# Patient Record
Sex: FEMALE | Race: OTHER | HISPANIC OR LATINO
[De-identification: names, ages, dates, MRNs, and addresses within clinical notes are randomized per-mention and may not be internally consistent; named-entity substitution may affect disease eponyms.]

---

## 2018-11-13 PROBLEM — Z00.00 ENCOUNTER FOR PREVENTIVE HEALTH EXAMINATION: Status: ACTIVE | Noted: 2018-11-13

## 2018-11-27 ENCOUNTER — APPOINTMENT (OUTPATIENT)
Dept: ENDOCRINOLOGY | Facility: CLINIC | Age: 58
End: 2018-11-27
Payer: COMMERCIAL

## 2018-11-27 VITALS
BODY MASS INDEX: 47.09 KG/M2 | SYSTOLIC BLOOD PRESSURE: 124 MMHG | HEART RATE: 78 BPM | WEIGHT: 293 LBS | HEIGHT: 66 IN | DIASTOLIC BLOOD PRESSURE: 81 MMHG

## 2018-11-27 DIAGNOSIS — E03.9 HYPOTHYROIDISM, UNSPECIFIED: ICD-10-CM

## 2018-11-27 DIAGNOSIS — R73.03 PREDIABETES.: ICD-10-CM

## 2018-11-27 DIAGNOSIS — R68.89 OTHER GENERAL SYMPTOMS AND SIGNS: ICD-10-CM

## 2018-11-27 DIAGNOSIS — E05.00 THYROTOXICOSIS WITH DIFFUSE GOITER W/OUT THYROTOXIC CRISIS OR STORM: ICD-10-CM

## 2018-11-27 DIAGNOSIS — E66.9 OBESITY, UNSPECIFIED: ICD-10-CM

## 2018-11-27 PROCEDURE — 99205 OFFICE O/P NEW HI 60 MIN: CPT

## 2018-11-28 PROBLEM — R73.03 PREDIABETES: Status: ACTIVE | Noted: 2018-11-28

## 2018-11-28 RX ORDER — METFORMIN HYDROCHLORIDE 500 MG/1
500 TABLET, COATED ORAL
Refills: 0 | Status: ACTIVE | COMMUNITY

## 2018-11-28 RX ORDER — ETODOLAC 500 MG/1
TABLET, FILM COATED ORAL
Refills: 0 | Status: ACTIVE | COMMUNITY

## 2018-11-28 RX ORDER — LEVOTHYROXINE SODIUM 0.15 MG/1
150 TABLET ORAL
Refills: 0 | Status: ACTIVE | COMMUNITY

## 2018-11-28 RX ORDER — EZETIMIBE 10 MG/1
10 TABLET ORAL
Refills: 0 | Status: ACTIVE | COMMUNITY

## 2018-11-28 RX ORDER — LISINOPRIL 10 MG/1
10 TABLET ORAL
Refills: 0 | Status: ACTIVE | COMMUNITY

## 2018-11-28 RX ORDER — ASPIRIN 81 MG
81 TABLET, DELAYED RELEASE (ENTERIC COATED) ORAL
Refills: 0 | Status: ACTIVE | COMMUNITY

## 2018-12-07 DIAGNOSIS — E55.9 VITAMIN D DEFICIENCY, UNSPECIFIED: ICD-10-CM

## 2018-12-07 LAB
25(OH)D3 SERPL-MCNC: 16.9 NG/ML
ALBUMIN SERPL ELPH-MCNC: 4.2 G/DL
ALP BLD-CCNC: 90 U/L
ALT SERPL-CCNC: 14 U/L
ANION GAP SERPL CALC-SCNC: 12 MMOL/L
AST SERPL-CCNC: 11 U/L
BILIRUB SERPL-MCNC: 0.2 MG/DL
BUN SERPL-MCNC: 12 MG/DL
CALCIUM SERPL-MCNC: 9.7 MG/DL
CHLORIDE SERPL-SCNC: 105 MMOL/L
CO2 SERPL-SCNC: 24 MMOL/L
CREAT SERPL-MCNC: 0.71 MG/DL
DHEA-S SERPL-MCNC: 39.7 UG/DL
FOLATE SERPL-MCNC: 11.7 NG/ML
GLUCOSE BLDC GLUCOMTR-MCNC: 72
GLUCOSE SERPL-MCNC: 103 MG/DL
IGF-1 INTERP: NORMAL
IGF-I BLD-MCNC: 168 NG/ML
POTASSIUM SERPL-SCNC: 5.3 MMOL/L
PROLACTIN SERPL-MCNC: 21 NG/ML
PROT SERPL-MCNC: 6.9 G/DL
SODIUM SERPL-SCNC: 141 MMOL/L
T3 SERPL-MCNC: 87 NG/DL
T4 FREE SERPL-MCNC: 1.4 NG/DL
TESTOST BND SERPL-MCNC: 0.4 PG/ML
TESTOST SERPL-MCNC: 26.1 NG/DL
TSH SERPL-ACNC: 4.85 UIU/ML
VIT B12 SERPL-MCNC: 641 PG/ML

## 2018-12-12 PROBLEM — E55.9 HYPOVITAMINOSIS D: Status: ACTIVE | Noted: 2018-12-12

## 2018-12-12 LAB
CORTIS 24H UR-MCNC: 24 H
CORTIS 24H UR-MRATE: 19 MCG/24 H
SPECIMEN VOL 24H UR: 2700 ML

## 2018-12-12 RX ORDER — ERGOCALCIFEROL 1.25 MG/1
1.25 MG CAPSULE ORAL
Qty: 12 | Refills: 3 | Status: ACTIVE | COMMUNITY
Start: 2018-12-12 | End: 1900-01-01

## 2019-01-04 ENCOUNTER — APPOINTMENT (OUTPATIENT)
Dept: ENDOCRINOLOGY | Facility: CLINIC | Age: 59
End: 2019-01-04

## 2019-01-29 ENCOUNTER — APPOINTMENT (OUTPATIENT)
Dept: ENDOCRINOLOGY | Facility: CLINIC | Age: 59
End: 2019-01-29

## 2019-09-09 NOTE — ASU PATIENT PROFILE, ADULT - PMH
CAD (coronary artery disease)    HLD (hyperlipidemia)    HTN (hypertension)    Hypothyroidism    Prediabetes

## 2019-09-10 ENCOUNTER — OUTPATIENT (OUTPATIENT)
Dept: OUTPATIENT SERVICES | Facility: HOSPITAL | Age: 59
LOS: 1 days | Discharge: HOME | End: 2019-09-10
Payer: COMMERCIAL

## 2019-09-10 ENCOUNTER — RESULT REVIEW (OUTPATIENT)
Age: 59
End: 2019-09-10

## 2019-09-10 VITALS
SYSTOLIC BLOOD PRESSURE: 148 MMHG | DIASTOLIC BLOOD PRESSURE: 86 MMHG | OXYGEN SATURATION: 99 % | RESPIRATION RATE: 16 BRPM | HEART RATE: 70 BPM

## 2019-09-10 VITALS
OXYGEN SATURATION: 100 % | DIASTOLIC BLOOD PRESSURE: 86 MMHG | RESPIRATION RATE: 18 BRPM | SYSTOLIC BLOOD PRESSURE: 138 MMHG | HEART RATE: 82 BPM | HEIGHT: 66 IN | WEIGHT: 293 LBS | TEMPERATURE: 99 F

## 2019-09-10 DIAGNOSIS — Z98.890 OTHER SPECIFIED POSTPROCEDURAL STATES: Chronic | ICD-10-CM

## 2019-09-10 LAB
BLD GP AB SCN SERPL QL: NEGATIVE — SIGNIFICANT CHANGE UP
GLUCOSE BLDC GLUCOMTR-MCNC: 110 MG/DL — HIGH (ref 70–99)
GLUCOSE BLDC GLUCOMTR-MCNC: 110 MG/DL — HIGH (ref 70–99)
RH IG SCN BLD-IMP: POSITIVE — SIGNIFICANT CHANGE UP

## 2019-09-10 PROCEDURE — 88305 TISSUE EXAM BY PATHOLOGIST: CPT

## 2019-09-10 PROCEDURE — 86900 BLOOD TYPING SEROLOGIC ABO: CPT

## 2019-09-10 PROCEDURE — 58558 HYSTEROSCOPY BIOPSY: CPT

## 2019-09-10 PROCEDURE — 86901 BLOOD TYPING SEROLOGIC RH(D): CPT

## 2019-09-10 PROCEDURE — 86850 RBC ANTIBODY SCREEN: CPT

## 2019-09-10 PROCEDURE — 82962 GLUCOSE BLOOD TEST: CPT

## 2019-09-10 RX ORDER — HYDROMORPHONE HYDROCHLORIDE 2 MG/ML
0.5 INJECTION INTRAMUSCULAR; INTRAVENOUS; SUBCUTANEOUS
Refills: 0 | Status: DISCONTINUED | OUTPATIENT
Start: 2019-09-10 | End: 2019-09-10

## 2019-09-10 RX ORDER — KETOROLAC TROMETHAMINE 30 MG/ML
30 SYRINGE (ML) INJECTION ONCE
Refills: 0 | Status: DISCONTINUED | OUTPATIENT
Start: 2019-09-10 | End: 2019-09-10

## 2019-09-10 RX ORDER — ONDANSETRON 8 MG/1
4 TABLET, FILM COATED ORAL ONCE
Refills: 0 | Status: DISCONTINUED | OUTPATIENT
Start: 2019-09-10 | End: 2019-09-10

## 2019-09-10 NOTE — BRIEF OPERATIVE NOTE - NSICDXBRIEFPROCEDURE_GEN_ALL_CORE_FT
PROCEDURES:  Dilation and curettage, uterus 10-Sep-2019 11:29:32  Yelena Cesar  Diagnostic hysteroscopy 10-Sep-2019 11:29:17  Yelena Cesar

## 2019-09-10 NOTE — BRIEF OPERATIVE NOTE - OPERATION/FINDINGS
Normal external genitalia, 20 weeks uterus. A uterine septum was visualized with hysteroscope, both canals were obliterated by a firm mass that seems to be a large fundal fibroid. D&C was preformed in a standard fashion. EMB was sent to pathology.

## 2019-09-10 NOTE — ASU PREOP CHECKLIST - DENTURES
Called back, left detailed message about the urine drug screen and current recommendations for treatment.  I recommend that they discuss with her counselor her current drug use.  If need be and if she has continued use we can seek out a drug treatment program.   no

## 2019-09-11 LAB — SURGICAL PATHOLOGY STUDY: SIGNIFICANT CHANGE UP

## 2019-10-20 NOTE — ASU PREOP CHECKLIST - 3.
Allergic to latex and alcohol swabs I have personally performed a face to face diagnostic evaluation on this patient. I have reviewed the ACP note and agree with the history, exam and plan of care, except as noted.

## 2020-12-15 NOTE — ASU PREOP CHECKLIST - NOTHING BY MOUTH SINCE
Pt adequately sedated.  Final time out done and agreed by all staff.  See [facility] records for all medications and vital signs.     09-Sep-2019 19:00

## 2021-04-22 ENCOUNTER — OFFICE VISIT (OUTPATIENT)
Dept: URGENT CARE | Facility: CLINIC | Age: 61
End: 2021-04-22
Payer: COMMERCIAL

## 2021-04-22 VITALS
WEIGHT: 288 LBS | OXYGEN SATURATION: 100 % | HEIGHT: 66 IN | TEMPERATURE: 96 F | HEART RATE: 86 BPM | BODY MASS INDEX: 46.28 KG/M2 | RESPIRATION RATE: 18 BRPM

## 2021-04-22 DIAGNOSIS — J02.9 SORE THROAT: Primary | ICD-10-CM

## 2021-04-22 DIAGNOSIS — J02.9 SORE THROAT: ICD-10-CM

## 2021-04-22 PROCEDURE — U0003 INFECTIOUS AGENT DETECTION BY NUCLEIC ACID (DNA OR RNA); SEVERE ACUTE RESPIRATORY SYNDROME CORONAVIRUS 2 (SARS-COV-2) (CORONAVIRUS DISEASE [COVID-19]), AMPLIFIED PROBE TECHNIQUE, MAKING USE OF HIGH THROUGHPUT TECHNOLOGIES AS DESCRIBED BY CMS-2020-01-R: HCPCS | Performed by: PHYSICIAN ASSISTANT

## 2021-04-22 PROCEDURE — U0005 INFEC AGEN DETEC AMPLI PROBE: HCPCS | Performed by: PHYSICIAN ASSISTANT

## 2021-04-22 PROCEDURE — 99203 OFFICE O/P NEW LOW 30 MIN: CPT | Performed by: PHYSICIAN ASSISTANT

## 2021-04-22 RX ORDER — LEVOTHYROXINE SODIUM 175 UG/1
TABLET ORAL
COMMUNITY
Start: 2021-01-27 | End: 2021-08-13 | Stop reason: SDUPTHER

## 2021-04-22 RX ORDER — EZETIMIBE AND SIMVASTATIN 10; 40 MG/1; MG/1
TABLET ORAL
COMMUNITY
Start: 2021-04-21 | End: 2021-08-13 | Stop reason: SDUPTHER

## 2021-04-22 RX ORDER — OFLOXACIN 3 MG/ML
10 SOLUTION AURICULAR (OTIC) 2 TIMES DAILY
Qty: 5 ML | Refills: 0 | Status: SHIPPED | OUTPATIENT
Start: 2021-04-22

## 2021-04-22 RX ORDER — ASPIRIN 81 MG/1
TABLET, COATED ORAL
COMMUNITY
Start: 2021-04-21 | End: 2021-08-13 | Stop reason: SDUPTHER

## 2021-04-22 RX ORDER — CARVEDILOL 12.5 MG/1
TABLET ORAL
COMMUNITY
Start: 2021-04-20 | End: 2021-08-13 | Stop reason: SDUPTHER

## 2021-04-22 NOTE — PATIENT INSTRUCTIONS
Try a throat coat tea, lozenges, or a numbing spray   Advil and tylenol for the throat       Sore Throat, Ambulatory Care   GENERAL INFORMATION:   A sore throat  is often caused by a cold or flu virus  A sore throat may also be caused by bacteria such as strep  Other causes include smoking, a runny nose, allergies, or acid reflux  Seek immediate care for the following symptoms:   · Trouble breathing or swallowing because your throat is swollen or sore    · Drooling because it hurts too much to swallow    · A painful lump in your throat that does not go away after 5 days    · A fever higher than 102? F (39?C) or lasts longer than 3 days    · Confusion    · Blood in your throat or ear  Treatment for a sore throat  will depend on the cause how severe it is  A sore throat cause by a virus will go away on its own without treatment  You will need antibiotics if your sore throat is caused by bacteria  Your sore throat should start to feel better within 3 to 5 days for both viral and bacterial infections  Care for your sore throat:   · Gargle with salt water  Mix ¼ teaspoon salt in a glass of warm water and gargle  This may help reduce swelling in your throat  · Take ibuprofen or acetaminophen:  These medicines decrease pain and fever  They are available without a doctor's order  Ask your healthcare provider which medicine is best for you  Ask how much to take and how often to take it  · Drink more liquids  Cold or warm drinks may help soothe your sore throat  Drinking liquids can also help prevent dehydration  · Use a cool-steam humidifier  to help moisten the air in your room and reduce your throat pain  · Use lozenges, ice, soft foods, or popsicles  to soothe your throat  · Rest your throat as much as possible  Try not to use your voice  This may irritate your throat and worsen your symptoms    Follow up with your healthcare provider as directed:  Write down your questions so you remember to ask them during your visits  CARE AGREEMENT:   You have the right to help plan your care  Learn about your health condition and how it may be treated  Discuss treatment options with your caregivers to decide what care you want to receive  You always have the right to refuse treatment  The above information is an  only  It is not intended as medical advice for individual conditions or treatments  Talk to your doctor, nurse or pharmacist before following any medical regimen to see if it is safe and effective for you  © 2014 [address] is for End User's use only and may not be sold, redistributed or otherwise used for commercial purposes  All illustrations and images included in CareNotes® are the copyrighted property of [employer]  or [employer] instructions provided to patient and patient instructed to self isolate at home until swab returns  Will follow up when COVID swab is available, Recommend to check [address] [place] [address] for quickest access to results  If result is positive individual must quarantine for 10 days from symptom onset  If a significant exposure occurred ( within 6 feet of a COVID positive pt for 15 minutes or more without a mask on) the exposed individual must self isolate for 14 days from that date and monitor for symptoms  Recommend taking Vitamins such as D3, C and Zinc OTC  Vitamin D3 2000 IU once a day  Vitamin C 1g by mouth twice a day 12 hours apart  It is recommended to wear a mask while in public or within 6 feet from others, proper hand washing and hygiene  If you are feeling unwell, we recommend to self-isolate at home and stay away from household contacts until well again  If you develop any chest pain, chest pain with deep breathing, shortness of breath, or trouble breathing go to the ER     COVID-19 (Coronavirus Disease 2019)   AMBULATORY CARE:   Coronavirus disease 2019 (COVID-19)  is the disease caused by the novel (new) coronavirus first discovered in December 2019  Coronaviruses generally cause upper respiratory (nose, throat, and lung) infections, such as a cold  The new virus can also cause serious lower respiratory conditions, such as pneumonia or acute respiratory distress syndrome (ARDS)  Anyone can develop serious problems from the new virus, but your risk is higher if you are 65 or older  A weak immune system, diabetes, or a heart or lung condition can also increase your risk  Signs and symptoms: You may not develop any signs or symptoms  Signs and symptoms that do develop usually start about 5 days after infection but can take 2 to 14 days  Signs and symptoms range from mild to severe  You may feel like you have the flu or a bad cold  Information on COVID-19 is still being learned  Tell your healthcare provider if you think you were infected but develop signs or symptoms not listed below:  · A cough    · Shortness of breath or trouble breathing that may become severe    · A fever of at least 100 4°F, or 38°C (may be lower in adults 65 or older)    · Chills that might include shaking    · Muscle pain, body aches, or a headache    · A sore throat    · Suddenly not being able to taste or smell anything    · Feeling mentally and physically tired (fatigue)    · Congestion (stuffy head and nose), or a runny nose    · Diarrhea, nausea, or vomiting    If you think you or someone you know may be infected:  Do the following to protect others:  · If emergency care is needed,  tell the  about the possible infection, or call ahead and tell the emergency department  · Call a healthcare provider  for instructions if symptoms are mild  Anyone who may be infected should not  arrive without calling first  The provider will need to protect staff members and other patients  · The person who may be infected needs to wear a face covering  while getting medical care   This will help lower the risk of infecting others  Coverings are not used for anyone who is younger than 2 years, has breathing problems, or cannot remove it  The provider can give you instructions for anyone who cannot wear a covering  Call your local emergency number (911 in the [address]) or go to the emergency department if:   · You have trouble breathing or shortness of breath at rest     · You have chest pain or pressure that lasts longer than 5 minutes  · You become confused or hard to wake  · Your lips or face are blue  · You have a fever of 104°F (40°C) or higher  Call your doctor if:   · You do not  have symptoms of COVID-19 but had close physical contact within 14 days with someone who tested positive  · You have questions or concerns about your condition or care  How COVID-19 is diagnosed: If you think you have COVID-19, call your healthcare provider  In some areas, testing is only done if a person has severe symptoms or is hospitalized  Testing is done more widely in other places  Your provider will tell you what to do based on your symptoms and the rules in your area  In general, the following may be used:  · A viral test  shows if you have a current infection  Samples are taken from your nose and throat, usually with swabs  You may need to wait several days to get the test results  Your healthcare provider will tell you how to get your results  You will need to quarantine (stay physically away from others) until you get your results  If results show you have COVID-19, you will need to quarantine until you are well  Your provider or other health official may give you more directions  You will also need to prevent another infection until it is known if you can get COVID-19 again  · An antibody test  shows if you had a past infection  Blood samples are used for this test  Antibodies are made by your immune system to attack the virus that causes COVID-19  Antibodies will form 1 to 3 weeks after you are infected   It is not known if antibodies prevent a second infection, or for how long a person might be protected  If you have antibodies, you will still need to be careful around others until more is known  · CT scans or x-rays  may be used to check for signs of pneumonia  The 2019 coronavirus causes a specific kind of pneumonia, usually in both lungs  Treatment:  No medicine or specific treatment is currently approved for COVID-19  The following may be used to manage your symptoms or treat the effects of COVID-19:  · Mild symptoms  may get better on their own  If you do not need to be treated in a hospital, you will be given instructions to use at home  Your condition will be closely monitored  You will need to watch for worsening symptoms and seek immediate care if needed  Talk to your healthcare provider about the following:    ? Relieve your symptoms  To soothe a sore throat, gargle with warm salt water, or use throat lozenges or a throat spray  Your healthcare provider may recommend a cough medicine  Drink more liquids to thin and loosen mucus and to prevent dehydration  Use decongestants or saline drops as directed for nasal congestion  ? NSAIDs or acetaminophen  can help lower a fever and relieve body aches or a headache  Follow directions  If not taken correctly, NSAIDs can cause kidney damage and acetaminophen can cause liver damage  · Severe or life-threatening symptoms  are treated in the hospital  You may need a combination of the following:    ? Medicines  may be given to reduce inflammation or to fight the virus  You may also need blood thinners to prevent or treat blood clots  If you have a deep vein thrombosis (DVT) or pulmonary embolism (PE), you may need to keep using blood thinners for 3 months  ? Extra oxygen  may be given if you have respiratory failure  This means your lungs cannot get enough oxygen into your blood and out to your organs  Extra oxygen can help prevent organ failure      ? A ventilator  may be used to help you breathe  ? Convalescent plasma (part of blood)  from a patient who has recovered from COVID-19 may be used  The plasma contains antibodies that can help your body fight the infection  Convalescent plasma is only given to patients who have severe signs and symptoms  How the 2019 coronavirus spreads: The virus spreads quickly and easily  You can become infected if you are in contact with a large amount of the virus, even for a short time  You can also become infected by being around a small amount of virus for a long time  The following are ways the virus is thought to spread, but more information may be coming:  · Droplets are the most common way all coronaviruses spread  The virus can travel in droplets that form when a person talks, coughs, or sneezes  Anyone who breathes in the droplets or gets them in his or her eyes can become infected with the virus  Close personal contact with an infected person is thought to be the main way the virus spreads  Close personal contact means you are within 6 feet (2 meters) of the person  · Person-to-person contact can spread the virus  For example, a person with the virus on his or her hands can spread it by shaking hands with someone  At this time, it does not appear that the virus can be passed to a baby during pregnancy or delivery  The baby can be infected after he or she is born through person-to-person contact  The virus also does not appear to spread in breast milk  If you are pregnant or breastfeeding, talk to your healthcare provider or obstetrician about any concerns you have  · The virus can stay on objects and surfaces  A person can get the virus on his or her hands by touching the object or surface  Infection happens if the person then touches his or her eyes or mouth with unwashed hands  It is not yet known how long the virus can stay on an object or surface   That is why it is important to clean all surfaces that are used regularly  · An infected animal may be able to infect a person who touches it  This may happen at live markets or on a farm  How everyone can lower the risk for COVID-19:  The best way to prevent infection is to avoid anyone who is infected, but this can be hard to do  An infected person can spread the virus before signs or symptoms begin, or even if signs or symptoms never develop  The following can help lower the risk for infection:      · Wash your hands often throughout the day  Use soap and water  Rub your soapy hands together, lacing your fingers  Wash the front and back of each hand, and in between your fingers  Use the fingers of one hand to scrub under the fingernails of the other hand  Wash for at least 20 seconds  Rinse with warm, running water for several seconds  Then dry your hands with a clean towel or paper towel  Use hand  that contains alcohol if soap and water are not available  Do not touch your eyes, nose, or mouth without washing your hands first  Teach children how to wash their hands and use hand   · Cover a sneeze or cough  This prevents droplets from traveling from you to others  Turn your face away and cover your mouth and nose with a tissue  Throw the tissue away  Use the bend of your arm if a tissue is not available  Then wash your hands well with soap and water or use hand   Turn and cover your face if you are around someone who is sneezing or coughing  Teach children how to cover a cough or sneeze  · Follow worldwide, national, and local social distancing guidelines  Social distancing means people avoid close physical contact so the virus cannot spread from one person to another  Keep at least 6 feet (2 meters) between you and others  Also keep this distance from anyone who comes to your home, such as someone making a delivery  · Make a habit of not touching your face    It is not known how long the virus can stay on objects and surfaces  If you get the virus on your hands, you can transfer it to your eyes, nose, or mouth and become infected  You can also transfer it to objects, surfaces, or people  Be aware of what you touch when you go out  Examples include handrails and elevator buttons  Try not to touch anything with bare hands unless it is necessary  Wash your hands before you leave your home and when you return  · Clean and disinfect high-touch surfaces and objects often  Use a disinfecting solution or wipes  You can make a solution by diluting 4 teaspoons of bleach in 1 quart (4 cups) of water  Clean and disinfect even if you think no one living in or coming to your home is infected with the virus  You can wipe items with a disinfecting cloth before you bring them into your home  Wash your hands after you handle anything you bring into your home  · Make your immune system as healthy as possible  A weakened immune system makes you more vulnerable to the new coronavirus  No COVID-19 vaccine is available yet  Vaccines such as the flu and pneumonia vaccines can help your immune system  Your healthcare provider can tell you which vaccines to get, and when to get them  Keep your immune system as strong as possible  Do not smoke  Eat healthy foods, exercise regularly, and try to manage stress  Go to bed and wake up at the same times each day  Social distancing guidelines:  National and local social distancing rules vary  Rules may change over time as restrictions are lifted  Restrictions may return if an outbreak happens where you live  It is important to know and follow all current social distancing rules in your area  The following are general guidelines:  · Limit trips out of your home  You may be able to have food, medicines, and other supplies delivered  If possible, have delivered items left at your door or other area  Try not to have someone hand you an item   You will be so close to the person that the virus can spread between you  · Do not have close physical contact with anyone who does not live in your home  Do not shake hands with, hug, or kiss a person as a greeting  Stand or walk as far from others as possible  If you must use public transportation (such as a bus or subway), try to sit or stand away from others  You can stay safely connected with others through phone calls, e-mail messages, social media websites, and video chats  Check in on anyone who may be having a hard time socially distancing, or who lives alone  Ask administrators at nursing homes or long-term care facilities how you can safely communicate with someone living there  · Wear a cloth face covering around others who do not live in your home  Face coverings help prevent the virus from spreading to others in droplets  You can use a clear face covering if someone needs to read your lips  This is a cloth covering that has plastic over the mouth area so your lips can be seen  Do not use coverings that have breathing valves or vents  The virus can travel out of the valve or vent and be spread to others  Do not take your covering off to talk, cough, or sneeze  Do not use coverings on children younger than 2 years or on anyone who has breathing problems or cannot remove it  · Only allow medical or other necessary professionals into your home  Wear your face covering, and remind professionals to wear a face covering  Remind them to wash their hands when they arrive and before they leave  Do not  let anyone who does not live in your home in, even if the person is not sick  A person can pass the virus to others before symptoms of COVID-19 begin  Some people never even develop symptoms  Children commonly have mild symptoms or no symptoms  It may be hard to tell a child not to hug or kiss you  Explain that this is how he or she can help you stay healthy  · Do not go to someone else's home unless it is necessary    Do not go over to visit, even if the person is lonely  Only go if you need to help him or her  Make sure you both wear face coverings while you are there  · Avoid large gatherings and crowds  Gatherings or crowds of 10 or more individuals can cause the virus to spread  Examples of gatherings include parties, sporting events, [employer] services, and conferences  Crowds may form at beaches, chew, and tourist attractions  Protect yourself by staying away from large gatherings and crowds  · Ask your healthcare provider for other ways to have appointments  You may be able to have appointments without having to go into the provider's office  Some providers offer phone, video, or other types of appointments  You may also be able to get prescriptions for a few months of your medicines at a time  · Stay safe if you must go out to work  You may have a job that can only be done outside your home  Keep physical distance between you and other workers as much as possible  Follow your employer's rules so everyone stays safe  If you have COVID-19 and are recovering at home:  Healthcare providers will give you specific instructions to follow  The following are general guidelines to remind you how to keep others safe until you are well:  · Wash your hands often  Use soap and water as much as possible  You can use hand  that contains alcohol if soap and water are not available  Do not share towels with anyone  If you use paper towels, throw them away in a lined trash can kept in your room or area  Use a covered trash can, if possible  · Do not go out of your home unless it is necessary  You may have to go to your healthcare provider's office for check-ups or to get prescription refills  Do not arrive at the provider's office without an appointment  Providers have to make their offices safe for staff and other patients  · Do not have close physical contact with anyone unless it is necessary    Only have close physical contact with a person giving direct care, or a baby or child you must care for  Family members and friends should not visit you  If possible, stay in a separate area or room of your home if you live with others  No one should go into the area or room except to give you care  You can visit with others by phone, video chat, e-mail, or similar systems  It is important to stay connected with others in your life while you recover  · Wear a face covering while others are near you  This can help prevent droplets from spreading the virus when you talk, sneeze, or cough  Put the covering on before anyone comes into your room or area  Remind the person to cover his or her nose and mouth before going in to provide care for you  · Do not share items  Do not share dishes, towels, or other items with anyone  Items need to be washed after you use them  · Protect your baby  Wash your hands with soap and water often throughout the day  Wear a clean face covering while you breastfeed, or while you express or pump breast milk  If possible, ask someone who is well to care for your baby  You can put breast milk in bottles for the person to use, if needed  Talk to your healthcare provider if you have any questions or concerns about caring for or bonding with your baby  He or she will tell you when to bring your baby in for check-ups and vaccines  He or she will also tell you what to do if you think your baby was infected with the new virus  · Do not handle live animals  Until more is known, it is best not to touch, play with, or handle live animals  Some animals, including pets, have been infected with the new coronavirus  Do not handle or care for animals until you are well  Care includes feeding, petting, and cuddling your pet  Do not let your pet lick you or share your food  Ask someone who is not infected to take care of your pet, if possible  If you must care for a pet, wear a face covering   Wash your hands before and after you give care     · Follow directions from your healthcare provider for being around others after you recover  You will need to wait at least 10 days after symptoms first appeared  Then you will need to have no fever for 24 hours without fever medicine, and no other symptoms  A loss of taste or smell may continue for several months  It is considered okay to be around others if this is your only symptom  It is not known for sure if or for how long a recovered person can pass the virus to others  Your provider may give you instructions, such as continuing social distancing or wearing a face covering around others  How to take care of someone who has COVID-19:  If the person lives in another home, arrange for a time to give care  Remember to bring a few pairs of disposable gloves and a cloth face covering  The following are general guidelines to help you safely care for anyone who has COVID-19:  · Wash your hands often  Wash before and after you go into the person's home, area, or room  Throw paper towels away in a lined trash can that has a lid, if possible  · Do not allow others to go near the person  No one should come into the person's home unless it is necessary  If possible, the person should be in a separate area or room if he or she lives with others  Keep the room's door shut unless you need to go in or out  Have others call, video chat, or e-mail the person if he or she is feeling well enough  The person may feel lonely if he or she is kept separate for a long period of time  Safe communication can help him or her stay connected to family and friends  · Make sure the person's room has good air flow  You may be able to open the window if the weather allows  An air conditioner can also be turned on to help air move  · Contact the person before you go in to give care  Make sure the person is wearing a face covering  Remind him or her to wash his or her hands with soap and water   He or she can use hand  that contains alcohol if soap and water are not available  Put on a face covering before you go in to give care  · Wear gloves while you give care and clean  Clean items the person uses often  Clean countertops, cooking surfaces, and the fronts and insides of the microwave and refrigerator  Clean the shower, toilet, the area around the toilet, the sink, the area around the sink, and faucets  Gather used laundry or bedding  Wash and dry items on the warmest settings the fabric allows  Wash dishes and silverware in hot, soapy water or in a   · Anything you throw away needs to go into a lined trash can  When you need to empty the trash, close the open end of the lining and tie it closed  This helps prevent items the virus is on from spilling out of the trash  Remove your gloves and throw them away  Wash your hands  Follow up with your doctor as directed:  Write down your questions so you remember to ask them during your visits  For more information:   · [employer] and Prevention  [address] , [address]  Phone: [phone]  Web Address: [employer] com br    © Copyright [address] Information is for End User's use only and may not be sold, redistributed or otherwise used for commercial purposes  All illustrations and images included in CareNotes® are the copyrighted property of [employer] D A M , Inc  or [facility] Scott Travis   The above information is an  only  It is not intended as medical advice for individual conditions or treatments  Talk to your doctor, nurse or pharmacist before following any medical regimen to see if it is safe and effective for you

## 2021-04-22 NOTE — PROGRESS NOTES
[email or web address]        NAME: Mynor Swift is a 62 y o  female  : 1960    MRN: [id number]  DATE: 2021  TIME: 10:46 AM    Assessment and Plan   Sore throat [J02 9]  1  Sore throat  Novel Coronavirus (COVID-19), PCR [facility] at   [facility]    ofloxacin (FLOXIN) 0 3 % otic solution    CANCELED: POCT rapid strepA    CANCELED: Throat culture         Patient Instructions   Patient Instructions     Try a throat coat tea, lozenges, or a numbing spray   Advil and tylenol for the throat       Sore Throat, Ambulatory Care   GENERAL INFORMATION:   A sore throat  is often caused by a cold or flu virus  A sore throat may also be caused by bacteria such as strep  Other causes include smoking, a runny nose, allergies, or acid reflux  Seek immediate care for the following symptoms:   · Trouble breathing or swallowing because your throat is swollen or sore    · Drooling because it hurts too much to swallow    · A painful lump in your throat that does not go away after 5 days    · A fever higher than 102? F (39?C) or lasts longer than 3 days    · Confusion    · Blood in your throat or ear  Treatment for a sore throat  will depend on the cause how severe it is  A sore throat cause by a virus will go away on its own without treatment  You will need antibiotics if your sore throat is caused by bacteria  Your sore throat should start to feel better within 3 to 5 days for both viral and bacterial infections  Care for your sore throat:   · Gargle with salt water  Mix ¼ teaspoon salt in a glass of warm water and gargle  This may help reduce swelling in your throat  · Take ibuprofen or acetaminophen:  These medicines decrease pain and fever  They are available without a doctor's order  Ask your healthcare provider which medicine is best for you  Ask how much to take and how often to take it  · Drink more liquids  Cold or warm drinks may help soothe your sore throat   Drinking liquids can also help prevent dehydration  · Use a cool-steam humidifier  to help moisten the air in your room and reduce your throat pain  · Use lozenges, ice, soft foods, or popsicles  to soothe your throat  · Rest your throat as much as possible  Try not to use your voice  This may irritate your throat and worsen your symptoms  Follow up with your healthcare provider as directed:  Write down your questions so you remember to ask them during your visits  CARE AGREEMENT:   You have the right to help plan your care  Learn about your health condition and how it may be treated  Discuss treatment options with your caregivers to decide what care you want to receive  You always have the right to refuse treatment  The above information is an  only  It is not intended as medical advice for individual conditions or treatments  Talk to your doctor, nurse or pharmacist before following any medical regimen to see if it is safe and effective for you  © 2014 [address] is for End User's use only and may not be sold, redistributed or otherwise used for commercial purposes  All illustrations and images included in CareNotes® are the copyrighted property of [employer]  or [employer]  COVID instructions provided to patient and patient instructed to self isolate at home until swab returns  Will follow up when COVID swab is available, Recommend to check [address] [place] [address] for quickest access to results  If result is positive individual must quarantine for 10 days from symptom onset  If a significant exposure occurred ( within 6 feet of a COVID positive pt for 15 minutes or more without a mask on) the exposed individual must self isolate for 14 days from that date and monitor for symptoms  Recommend taking Vitamins such as D3, C and Zinc OTC  Vitamin D3 2000 IU once a day  Vitamin C 1g by mouth twice a day 12 hours apart     It is recommended to wear a mask while in public or within 6 feet from others, proper hand washing and hygiene  If you are feeling unwell, we recommend to self-isolate at home and stay away from household contacts until well again  If you develop any chest pain, chest pain with deep breathing, shortness of breath, or trouble breathing go to the ER  COVID-19 (Coronavirus Disease 2019)   AMBULATORY CARE:   Coronavirus disease 2019 (COVID-19)  is the disease caused by the novel (new) coronavirus first discovered in December 2019  Coronaviruses generally cause upper respiratory (nose, throat, and lung) infections, such as a cold  The new virus can also cause serious lower respiratory conditions, such as pneumonia or acute respiratory distress syndrome (ARDS)  Anyone can develop serious problems from the new virus, but your risk is higher if you are 65 or older  A weak immune system, diabetes, or a heart or lung condition can also increase your risk  Signs and symptoms: You may not develop any signs or symptoms  Signs and symptoms that do develop usually start about 5 days after infection but can take 2 to 14 days  Signs and symptoms range from mild to severe  You may feel like you have the flu or a bad cold  Information on COVID-19 is still being learned   Tell your healthcare provider if you think you were infected but develop signs or symptoms not listed below:  · A cough    · Shortness of breath or trouble breathing that may become severe    · A fever of at least 100 4°F, or 38°C (may be lower in adults 65 or older)    · Chills that might include shaking    · Muscle pain, body aches, or a headache    · A sore throat    · Suddenly not being able to taste or smell anything    · Feeling mentally and physically tired (fatigue)    · Congestion (stuffy head and nose), or a runny nose    · Diarrhea, nausea, or vomiting    If you think you or someone you know may be infected:  Do the following to protect others:  · If emergency care is needed,  tell the  about the possible infection, or call ahead and tell the emergency department  · Call a healthcare provider  for instructions if symptoms are mild  Anyone who may be infected should not  arrive without calling first  The provider will need to protect staff members and other patients  · The person who may be infected needs to wear a face covering  while getting medical care  This will help lower the risk of infecting others  Coverings are not used for anyone who is younger than 2 years, has breathing problems, or cannot remove it  The provider can give you instructions for anyone who cannot wear a covering  Call your local emergency number (911 in the [address] [place] [address],[address]) or go to the emergency department if:   · You have trouble breathing or shortness of breath at rest     · You have chest pain or pressure that lasts longer than 5 minutes  · You become confused or hard to wake  · Your lips or face are blue  · You have a fever of 104°F (40°C) or higher  Call your doctor if:   · You do not  have symptoms of COVID-19 but had close physical contact within 14 days with someone who tested positive  · You have questions or concerns about your condition or care  How COVID-19 is diagnosed: If you think you have COVID-19, call your healthcare provider  In some areas, testing is only done if a person has severe symptoms or is hospitalized  Testing is done more widely in other places  Your provider will tell you what to do based on your symptoms and the rules in your area  In general, the following may be used:  · A viral test  shows if you have a current infection  Samples are taken from your nose and throat, usually with swabs  You may need to wait several days to get the test results  Your healthcare provider will tell you how to get your results  You will need to quarantine (stay physically away from others) until you get your results   If results show you have COVID-19, you will need to quarantine until you are well  Your provider or other health official may give you more directions  You will also need to prevent another infection until it is known if you can get COVID-19 again  · An antibody test  shows if you had a past infection  Blood samples are used for this test  Antibodies are made by your immune system to attack the virus that causes COVID-19  Antibodies will form 1 to 3 weeks after you are infected  It is not known if antibodies prevent a second infection, or for how long a person might be protected  If you have antibodies, you will still need to be careful around others until more is known  · CT scans or x-rays  may be used to check for signs of pneumonia  The 2019 coronavirus causes a specific kind of pneumonia, usually in both lungs  Treatment:  No medicine or specific treatment is currently approved for COVID-19  The following may be used to manage your symptoms or treat the effects of COVID-19:  · Mild symptoms  may get better on their own  If you do not need to be treated in a hospital, you will be given instructions to use at home  Your condition will be closely monitored  You will need to watch for worsening symptoms and seek immediate care if needed  Talk to your healthcare provider about the following:    ? Relieve your symptoms  To soothe a sore throat, gargle with warm salt water, or use throat lozenges or a throat spray  Your healthcare provider may recommend a cough medicine  Drink more liquids to thin and loosen mucus and to prevent dehydration  Use decongestants or saline drops as directed for nasal congestion  ? NSAIDs or acetaminophen  can help lower a fever and relieve body aches or a headache  Follow directions  If not taken correctly, NSAIDs can cause kidney damage and acetaminophen can cause liver damage      · Severe or life-threatening symptoms  are treated in the hospital  You may need a combination of the following:    ? Medicines  may be given to reduce inflammation or to fight the virus  You may also need blood thinners to prevent or treat blood clots  If you have a deep vein thrombosis (DVT) or pulmonary embolism (PE), you may need to keep using blood thinners for 3 months  ? Extra oxygen  may be given if you have respiratory failure  This means your lungs cannot get enough oxygen into your blood and out to your organs  Extra oxygen can help prevent organ failure  ? A ventilator  may be used to help you breathe  ? Convalescent plasma (part of blood)  from a patient who has recovered from COVID-19 may be used  The plasma contains antibodies that can help your body fight the infection  Convalescent plasma is only given to patients who have severe signs and symptoms  How the 2019 coronavirus spreads: The virus spreads quickly and easily  You can become infected if you are in contact with a large amount of the virus, even for a short time  You can also become infected by being around a small amount of virus for a long time  The following are ways the virus is thought to spread, but more information may be coming:  · Droplets are the most common way all coronaviruses spread  The virus can travel in droplets that form when a person talks, coughs, or sneezes  Anyone who breathes in the droplets or gets them in his or her eyes can become infected with the virus  Close personal contact with an infected person is thought to be the main way the virus spreads  Close personal contact means you are within 6 feet (2 meters) of the person  · Person-to-person contact can spread the virus  For example, a person with the virus on his or her hands can spread it by shaking hands with someone  At this time, it does not appear that the virus can be passed to a baby during pregnancy or delivery  The baby can be infected after he or she is born through person-to-person contact  The virus also does not appear to spread in breast milk   If you are pregnant or breastfeeding, talk to your healthcare provider or obstetrician about any concerns you have  · The virus can stay on objects and surfaces  A person can get the virus on his or her hands by touching the object or surface  Infection happens if the person then touches his or her eyes or mouth with unwashed hands  It is not yet known how long the virus can stay on an object or surface  That is why it is important to clean all surfaces that are used regularly  · An infected animal may be able to infect a person who touches it  This may happen at live markets or on a farm  How everyone can lower the risk for COVID-19:  The best way to prevent infection is to avoid anyone who is infected, but this can be hard to do  An infected person can spread the virus before signs or symptoms begin, or even if signs or symptoms never develop  The following can help lower the risk for infection:      · Wash your hands often throughout the day  Use soap and water  Rub your soapy hands together, lacing your fingers  Wash the front and back of each hand, and in between your fingers  Use the fingers of one hand to scrub under the fingernails of the other hand  Wash for at least 20 seconds  Rinse with warm, running water for several seconds  Then dry your hands with a clean towel or paper towel  Use hand  that contains alcohol if soap and water are not available  Do not touch your eyes, nose, or mouth without washing your hands first  Teach children how to wash their hands and use hand   · Cover a sneeze or cough  This prevents droplets from traveling from you to others  Turn your face away and cover your mouth and nose with a tissue  Throw the tissue away  Use the bend of your arm if a tissue is not available  Then wash your hands well with soap and water or use hand   Turn and cover your face if you are around someone who is sneezing or coughing   Teach children how to cover a cough or sneeze  · Follow worldwide, national, and local social distancing guidelines  Social distancing means people avoid close physical contact so the virus cannot spread from one person to another  Keep at least 6 feet (2 meters) between you and others  Also keep this distance from anyone who comes to your home, such as someone making a delivery  · Make a habit of not touching your face  It is not known how long the virus can stay on objects and surfaces  If you get the virus on your hands, you can transfer it to your eyes, nose, or mouth and become infected  You can also transfer it to objects, surfaces, or people  Be aware of what you touch when you go out  Examples include handrails and elevator buttons  Try not to touch anything with bare hands unless it is necessary  Wash your hands before you leave your home and when you return  · Clean and disinfect high-touch surfaces and objects often  Use a disinfecting solution or wipes  You can make a solution by diluting 4 teaspoons of bleach in 1 quart (4 cups) of water  Clean and disinfect even if you think no one living in or coming to your home is infected with the virus  You can wipe items with a disinfecting cloth before you bring them into your home  Wash your hands after you handle anything you bring into your home  · Make your immune system as healthy as possible  A weakened immune system makes you more vulnerable to the new coronavirus  No COVID-19 vaccine is available yet  Vaccines such as the flu and pneumonia vaccines can help your immune system  Your healthcare provider can tell you which vaccines to get, and when to get them  Keep your immune system as strong as possible  Do not smoke  Eat healthy foods, exercise regularly, and try to manage stress  Go to bed and wake up at the same times each day  Social distancing guidelines:  National and local social distancing rules vary  Rules may change over time as restrictions are lifted  Restrictions may return if an outbreak happens where you live  It is important to know and follow all current social distancing rules in your area  The following are general guidelines:  · Limit trips out of your home  You may be able to have food, medicines, and other supplies delivered  If possible, have delivered items left at your door or other area  Try not to have someone hand you an item  You will be so close to the person that the virus can spread between you  · Do not have close physical contact with anyone who does not live in your home  Do not shake hands with, hug, or kiss a person as a greeting  Stand or walk as far from others as possible  If you must use public transportation (such as a bus or subway), try to sit or stand away from others  You can stay safely connected with others through phone calls, e-mail messages, social media websites, and video chats  Check in on anyone who may be having a hard time socially distancing, or who lives alone  Ask administrators at nursing homes or long-term care facilities how you can safely communicate with someone living there  · Wear a cloth face covering around others who do not live in your home  Face coverings help prevent the virus from spreading to others in droplets  You can use a clear face covering if someone needs to read your lips  This is a cloth covering that has plastic over the mouth area so your lips can be seen  Do not use coverings that have breathing valves or vents  The virus can travel out of the valve or vent and be spread to others  Do not take your covering off to talk, cough, or sneeze  Do not use coverings on children younger than 2 years or on anyone who has breathing problems or cannot remove it  · Only allow medical or other necessary professionals into your home  Wear your face covering, and remind professionals to wear a face covering  Remind them to wash their hands when they arrive and before they leave   Do not  let anyone who does not live in your home in, even if the person is not sick  A person can pass the virus to others before symptoms of COVID-19 begin  Some people never even develop symptoms  Children commonly have mild symptoms or no symptoms  It may be hard to tell a child not to hug or kiss you  Explain that this is how he or she can help you stay healthy  · Do not go to someone else's home unless it is necessary  Do not go over to visit, even if the person is lonely  Only go if you need to help him or her  Make sure you both wear face coverings while you are there  · Avoid large gatherings and crowds  Gatherings or crowds of 10 or more individuals can cause the virus to spread  Examples of gatherings include parties, sporting events, [employer] services, and conferences  Crowds may form at beaches, chew, and tourist attractions  Protect yourself by staying away from large gatherings and crowds  · Ask your healthcare provider for other ways to have appointments  You may be able to have appointments without having to go into the provider's office  Some providers offer phone, video, or other types of appointments  You may also be able to get prescriptions for a few months of your medicines at a time  · Stay safe if you must go out to work  You may have a job that can only be done outside your home  Keep physical distance between you and other workers as much as possible  Follow your employer's rules so everyone stays safe  If you have COVID-19 and are recovering at home:  Healthcare providers will give you specific instructions to follow  The following are general guidelines to remind you how to keep others safe until you are well:  · Wash your hands often  Use soap and water as much as possible  You can use hand  that contains alcohol if soap and water are not available  Do not share towels with anyone   If you use paper towels, throw them away in a lined trash can kept in your room or area  Use a covered trash can, if possible  · Do not go out of your home unless it is necessary  You may have to go to your healthcare provider's office for check-ups or to get prescription refills  Do not arrive at the provider's office without an appointment  Providers have to make their offices safe for staff and other patients  · Do not have close physical contact with anyone unless it is necessary  Only have close physical contact with a person giving direct care, or a baby or child you must care for  Family members and friends should not visit you  If possible, stay in a separate area or room of your home if you live with others  No one should go into the area or room except to give you care  You can visit with others by phone, video chat, e-mail, or similar systems  It is important to stay connected with others in your life while you recover  · Wear a face covering while others are near you  This can help prevent droplets from spreading the virus when you talk, sneeze, or cough  Put the covering on before anyone comes into your room or area  Remind the person to cover his or her nose and mouth before going in to provide care for you  · Do not share items  Do not share dishes, towels, or other items with anyone  Items need to be washed after you use them  · Protect your baby  Wash your hands with soap and water often throughout the day  Wear a clean face covering while you breastfeed, or while you express or pump breast milk  If possible, ask someone who is well to care for your baby  You can put breast milk in bottles for the person to use, if needed  Talk to your healthcare provider if you have any questions or concerns about caring for or bonding with your baby  He or she will tell you when to bring your baby in for check-ups and vaccines  He or she will also tell you what to do if you think your baby was infected with the new virus  · Do not handle live animals    Until more is known, it is best not to touch, play with, or handle live animals  Some animals, including pets, have been infected with the new coronavirus  Do not handle or care for animals until you are well  Care includes feeding, petting, and cuddling your pet  Do not let your pet lick you or share your food  Ask someone who is not infected to take care of your pet, if possible  If you must care for a pet, wear a face covering  Wash your hands before and after you give care  · Follow directions from your healthcare provider for being around others after you recover  You will need to wait at least 10 days after symptoms first appeared  Then you will need to have no fever for 24 hours without fever medicine, and no other symptoms  A loss of taste or smell may continue for several months  It is considered okay to be around others if this is your only symptom  It is not known for sure if or for how long a recovered person can pass the virus to others  Your provider may give you instructions, such as continuing social distancing or wearing a face covering around others  How to take care of someone who has COVID-19:  If the person lives in another home, arrange for a time to give care  Remember to bring a few pairs of disposable gloves and a cloth face covering  The following are general guidelines to help you safely care for anyone who has COVID-19:  · Wash your hands often  Wash before and after you go into the person's home, area, or room  Throw paper towels away in a lined trash can that has a lid, if possible  · Do not allow others to go near the person  No one should come into the person's home unless it is necessary  If possible, the person should be in a separate area or room if he or she lives with others  Keep the room's door shut unless you need to go in or out  Have others call, video chat, or e-mail the person if he or she is feeling well enough   The person may feel lonely if he or she is kept separate for a long period of time  Safe communication can help him or her stay connected to family and friends  · Make sure the person's room has good air flow  You may be able to open the window if the weather allows  An air conditioner can also be turned on to help air move  · Contact the person before you go in to give care  Make sure the person is wearing a face covering  Remind him or her to wash his or her hands with soap and water  He or she can use hand  that contains alcohol if soap and water are not available  Put on a face covering before you go in to give care  · Wear gloves while you give care and clean  Clean items the person uses often  Clean countertops, cooking surfaces, and the fronts and insides of the microwave and refrigerator  Clean the shower, toilet, the area around the toilet, the sink, the area around the sink, and faucets  Gather used laundry or bedding  Wash and dry items on the warmest settings the fabric allows  Wash dishes and silverware in hot, soapy water or in a   · Anything you throw away needs to go into a lined trash can  When you need to empty the trash, close the open end of the lining and tie it closed  This helps prevent items the virus is on from spilling out of the trash  Remove your gloves and throw them away  Wash your hands  Follow up with your doctor as directed:  Write down your questions so you remember to ask them during your visits  For more information:   · Centers for Disease Control and Prevention  [address] , [address]  Phone: 4- [phone]  Web Address: [employer] dorcas    © Copyright [address] [place] [address] Drive Information is for End User's use only and may not be sold, redistributed or otherwise used for commercial purposes  All illustrations and images included in CareNotes® are the copyrighted property of [employer]  or [facility] [place]  The above information is an  only   It is not intended as medical advice for individual conditions or treatments  Talk to your doctor, nurse or pharmacist before following any medical regimen to see if it is safe and effective for you  Follow up with PCP in 3-5 days  Proceed to  ER if symptoms worsen  Chief Complaint     Chief Complaint   Patient presents with    Sore Throat     started 3 days     Earache         History of Present Illness       The pt is a 52-year-old female presenting with a sore throat and R ear pain x 3 days  Denies COVID exposures  Denies fever or chills  Denies SOB or chest pain  Review of Systems   Review of Systems   Constitutional: Negative for activity change, appetite change, chills, fatigue and fever  HENT: Positive for ear pain (R) and sore throat  Negative for congestion, rhinorrhea, sinus pressure and sinus pain  Respiratory: Negative for cough, chest tightness and shortness of breath  Cardiovascular: Negative for chest pain and palpitations  Gastrointestinal: Negative for diarrhea, nausea and vomiting  Musculoskeletal: Negative for arthralgias and myalgias  Skin: Negative for color change and pallor  Neurological: Negative for headaches           Current Medications       Current Outpatient Medications:     Aspirin Low Dose 81 MG EC tablet, , Disp: , Rfl:     carvedilol (COREG) 12 5 mg tablet, , Disp: , Rfl:     ezetimibe-simvastatin (VYTORIN) 10-40 mg per tablet, , Disp: , Rfl:     levothyroxine 175 mcg tablet, , Disp: , Rfl:     metFORMIN (GLUCOPHAGE) 1000 MG tablet, , Disp: , Rfl:     ofloxacin (FLOXIN) 0 3 % otic solution, Administer 10 drops into both ears 2 (two) times a day, Disp: 5 mL, Rfl: 0    Current Allergies     Allergies as of 04/22/2021 - Reviewed 04/22/2021   Allergen Reaction Noted    Latex Hives 04/22/2021            The following portions of the patient's history were reviewed and updated as appropriate: allergies, current medications, past family history, past medical history, past social history, past surgical history and problem list      Past Medical History:   Diagnosis Date    Diabetes mellitus ([address] )     Disease of thyroid gland     Hypertension        Past Surgical History:   Procedure Laterality Date    CARDIAC SURGERY         History reviewed  No pertinent family history  Medications have been verified  Objective   Pulse 86   Temp (!) 96 °F (35 6 °C) (Temporal)   Resp 18   Ht 5' 6" (1 676 m)   Wt 131 kg (288 lb)   SpO2 100%   BMI 46 48 kg/m²        Physical Exam     Physical Exam  Vitals signs reviewed  Constitutional:       General: She is not in acute distress  Appearance: Normal appearance  She is well-developed  She is obese  She is not ill-appearing, toxic-appearing or diaphoretic  HENT:      Head: Normocephalic and atraumatic  Right Ear: Tympanic membrane normal  No drainage, swelling or tenderness  No middle ear effusion  Tympanic membrane is not erythematous  Left Ear: Tympanic membrane normal  No drainage, swelling or tenderness  No middle ear effusion  Tympanic membrane is not erythematous  Ears:      Comments:   Erythema of both canals  Does not use anything in ears  Nose: No congestion or rhinorrhea  Mouth/Throat:      Mouth: Mucous membranes are moist  No oral lesions  Pharynx: Oropharynx is clear  Uvula midline  No pharyngeal swelling, oropharyngeal exudate, posterior oropharyngeal erythema or uvula swelling  Tonsils: No tonsillar exudate or tonsillar abscesses  0 on the right  0 on the left  Eyes:      Extraocular Movements:      Right eye: Normal extraocular motion  Left eye: Normal extraocular motion  Conjunctiva/sclera: Conjunctivae normal       Pupils: Pupils are equal, round, and reactive to light  Cardiovascular:      Rate and Rhythm: Normal rate and regular rhythm  Heart sounds: Normal heart sounds  No murmur  No friction rub  No gallop      Pulmonary:      Effort: Pulmonary effort is normal  No respiratory distress  Breath sounds: Normal breath sounds  No stridor  No wheezing, rhonchi or rales  Chest:      Chest wall: No tenderness  Abdominal:      General: Abdomen is flat  Bowel sounds are normal  There is no distension  Palpations: Abdomen is soft  Tenderness: There is no abdominal tenderness  There is no guarding  Musculoskeletal: Normal range of motion  Skin:     General: Skin is warm and dry  Capillary Refill: Capillary refill takes less than 2 seconds  Neurological:      Mental Status: She is alert

## 2021-04-23 LAB — SARS-COV-2 RNA RESP QL NAA+PROBE: NEGATIVE

## 2021-07-01 ENCOUNTER — OFFICE VISIT (OUTPATIENT)
Dept: FAMILY MEDICINE CLINIC | Facility: CLINIC | Age: 61
End: 2021-07-01
Payer: COMMERCIAL

## 2021-07-01 VITALS
SYSTOLIC BLOOD PRESSURE: 126 MMHG | OXYGEN SATURATION: 97 % | TEMPERATURE: 97.8 F | BODY MASS INDEX: 46.83 KG/M2 | DIASTOLIC BLOOD PRESSURE: 82 MMHG | WEIGHT: 291.4 LBS | HEIGHT: 66 IN | HEART RATE: 85 BPM

## 2021-07-01 DIAGNOSIS — Z12.31 ENCOUNTER FOR SCREENING MAMMOGRAM FOR MALIGNANT NEOPLASM OF BREAST: ICD-10-CM

## 2021-07-01 DIAGNOSIS — I10 ESSENTIAL HYPERTENSION: ICD-10-CM

## 2021-07-01 DIAGNOSIS — Z15.09 BRCA POSITIVE: ICD-10-CM

## 2021-07-01 DIAGNOSIS — E03.9 HYPOTHYROIDISM, UNSPECIFIED TYPE: ICD-10-CM

## 2021-07-01 DIAGNOSIS — E11.9 TYPE 2 DIABETES MELLITUS WITHOUT COMPLICATION, WITHOUT LONG-TERM CURRENT USE OF INSULIN (HCC): Primary | ICD-10-CM

## 2021-07-01 DIAGNOSIS — Z12.4 SCREENING FOR CERVICAL CANCER: ICD-10-CM

## 2021-07-01 DIAGNOSIS — I25.10 CORONARY ARTERY DISEASE INVOLVING NATIVE CORONARY ARTERY OF NATIVE HEART WITHOUT ANGINA PECTORIS: ICD-10-CM

## 2021-07-01 DIAGNOSIS — E78.2 MIXED HYPERLIPIDEMIA: ICD-10-CM

## 2021-07-01 DIAGNOSIS — E66.01 CLASS 3 SEVERE OBESITY DUE TO EXCESS CALORIES WITH SERIOUS COMORBIDITY AND BODY MASS INDEX (BMI) OF 45.0 TO 49.9 IN ADULT (HCC): ICD-10-CM

## 2021-07-01 DIAGNOSIS — Z86.2 HISTORY OF ANEMIA: ICD-10-CM

## 2021-07-01 DIAGNOSIS — I83.813 VARICOSE VEINS OF BOTH LOWER EXTREMITIES WITH PAIN: ICD-10-CM

## 2021-07-01 DIAGNOSIS — R06.00 DOE (DYSPNEA ON EXERTION): ICD-10-CM

## 2021-07-01 DIAGNOSIS — Z95.5 PRESENCE OF STENT IN LAD CORONARY ARTERY: ICD-10-CM

## 2021-07-01 DIAGNOSIS — Z15.01 BRCA POSITIVE: ICD-10-CM

## 2021-07-01 DIAGNOSIS — E13.69 OTHER SPECIFIED DIABETES MELLITUS WITH OTHER SPECIFIED COMPLICATION, WITHOUT LONG-TERM CURRENT USE OF INSULIN (HCC): ICD-10-CM

## 2021-07-01 PROBLEM — E66.813 CLASS 3 SEVERE OBESITY DUE TO EXCESS CALORIES WITH SERIOUS COMORBIDITY AND BODY MASS INDEX (BMI) OF 45.0 TO 49.9 IN ADULT (HCC): Status: ACTIVE | Noted: 2021-07-01

## 2021-07-01 PROBLEM — R06.09 DOE (DYSPNEA ON EXERTION): Status: ACTIVE | Noted: 2021-07-01

## 2021-07-01 LAB — SL AMB POCT HEMOGLOBIN AIC: 6.2 (ref ?–6.5)

## 2021-07-01 PROCEDURE — 83036 HEMOGLOBIN GLYCOSYLATED A1C: CPT | Performed by: PHYSICIAN ASSISTANT

## 2021-07-01 PROCEDURE — 1036F TOBACCO NON-USER: CPT | Performed by: PHYSICIAN ASSISTANT

## 2021-07-01 PROCEDURE — 99204 OFFICE O/P NEW MOD 45 MIN: CPT | Performed by: PHYSICIAN ASSISTANT

## 2021-07-01 PROCEDURE — 3008F BODY MASS INDEX DOCD: CPT | Performed by: PHYSICIAN ASSISTANT

## 2021-07-01 PROCEDURE — 3725F SCREEN DEPRESSION PERFORMED: CPT | Performed by: PHYSICIAN ASSISTANT

## 2021-07-01 PROCEDURE — 3044F HG A1C LEVEL LT 7.0%: CPT | Performed by: PHYSICIAN ASSISTANT

## 2021-07-01 RX ORDER — LISINOPRIL 20 MG/1
20 TABLET ORAL DAILY
COMMUNITY
End: 2021-08-13 | Stop reason: SDUPTHER

## 2021-07-01 NOTE — PROGRESS NOTES
Assessment/Plan:       Problem List Items Addressed This Visit        Endocrine    Hypothyroidism    Relevant Orders    TSH, 3rd generation with Free T4 reflex    Type 2 diabetes mellitus without complication, without long-term current use of insulin ([address] ) - Primary    Relevant Orders    Comprehensive metabolic panel    Microalbumin / creatinine urine ratio    IRIS Diabetic eye exam       Cardiovascular and Mediastinum    Coronary artery disease involving native coronary artery of native heart without angina pectoris    Relevant Orders    Ambulatory referral to Cardiology    Essential hypertension    Relevant Medications    lisinopril (ZESTRIL) 20 mg tablet    Other Relevant Orders    Ambulatory referral to Cardiology    Varicose veins of both lower extremities with pain    Relevant Orders    Ambulatory referral to Vascular Surgery    VAS reflux lower limb venous duplex study with reflux assessment, complete bilateral       Other    Presence of stent in LAD coronary artery    Relevant Orders    Ambulatory referral to Cardiology    Mixed hyperlipidemia    Relevant Orders    Lipid Panel with Direct LDL reflex    Ambulatory referral to Cardiology    NOLASCO (dyspnea on exertion)    Class 3 severe obesity due to excess calories with serious comorbidity and body mass index (BMI) of 45 0 to 49 9 in adult [facility])    BRCA positive    History of anemia    Relevant Orders    CBC and differential      Other Visit Diagnoses     Encounter for screening mammogram for malignant neoplasm of breast        Relevant Orders    Mammo screening bilateral w cad    Screening for cervical cancer        Relevant Orders    Ambulatory referral to Gynecology    Other specified diabetes mellitus with other specified complication, without long-term current use of insulin ([address] )        Relevant Orders    POCT hemoglobin A1c (Completed)       hx reviewed and updated, meds reviewed and accurate   Appropriate referrals placed  DM well controlled, continue carb control and metformin, a1c 6 2  BP at goal, continue ACE, BB  Strict ED precautions for CP,worsening SOB  Update labs and HM  Update mammo and refer to GYN for BRCA status  Will follow up in 3 months, earlier as needed     Subjective:      Patient ID: Croy Downing is a 62 y o  female  Pt presents to the office to establish care  She has recently moved from [facility]  - CAD, s/p stenting to mid LAD in 2010  She shares she was following with cardiology every 3 months  She remains on ASA, statin, coreg, ACE  She shares she recently had a study and they told her there were no blockages, this study was not available for review  She does note chronic SOB on exertion  She also shares her lips turn "blue/black" when she walks too long, she shares she also has a hx of anemia  Denies CP, syncope  - HTN: 126/82, on coreg 12 5mg, lisinopril 20mg  - DM: last a1c was 5 6 on metformin 1000mg BID, today is 6 2  - HLD: on statin  - venous insufficiency with stasis dermatitis, previous venous surgery, shares she was supposed to have another surgery to "close the vein"  She does have BLE edema and discoloration  - hypothyroid, on 175mcg daily, she shares her tsh is not normally stable  - BRCA positive, she shares today that she was told she would have breast cancer "in the next 5 years"  No personal hx of breast cancer, long FH of breast cancer  - UTD to CRC screening per pt  - in need of mammogram and PAP    FH  - ASCVD, HLD, HTN, DM, breast cancer    SH  - non smoker  - no alcohol or drug use  - shares she was on a diet but has not been regularly following this, has gained some weight     Meds  - reviewed and updated    Allergies  - valeria causes anaphylaxis     She does not offer any acute complaints  The following portions of the patient's history were reviewed and updated as appropriate:   She  has a past medical history of Diabetes mellitus ([address] ), Disease of thyroid gland, and Hypertension    She   Patient Active Problem List    Diagnosis Date Noted    Coronary artery disease involving native coronary artery of native heart without angina pectoris 07/01/2021    Presence of stent in LAD coronary artery 07/01/2021    Hypothyroidism 07/01/2021    Essential hypertension 07/01/2021    Mixed hyperlipidemia 07/01/2021    Type 2 diabetes mellitus without complication, without long-term current use of insulin ([facility] ) 07/01/2021    NOLASCO (dyspnea on exertion) 07/01/2021    Class 3 severe obesity due to excess calories with serious comorbidity and body mass index (BMI) of 45 0 to 49 9 in adult [facility]) 07/01/2021    Varicose veins of both lower extremities with pain 07/01/2021    BRCA positive 07/01/2021    History of anemia 07/01/2021     She  has a past surgical history that includes Cardiac surgery  Her family history is not on file  She  reports that she has never smoked  She has never used smokeless tobacco  She reports that she does not drink alcohol and does not use drugs  Current Outpatient Medications   Medication Sig Dispense Refill    Aspirin Low Dose 81 MG EC tablet       carvedilol (COREG) 12 5 mg tablet       ezetimibe-simvastatin (VYTORIN) 10-40 mg per tablet       levothyroxine 175 mcg tablet       lisinopril (ZESTRIL) 20 mg tablet Take 20 mg by mouth daily      metFORMIN (GLUCOPHAGE) 1000 MG tablet       ofloxacin (FLOXIN) 0 3 % otic solution Administer 10 drops into both ears 2 (two) times a day (Patient not taking: Reported on 7/1/2021) 5 mL 0     No current facility-administered medications for this visit       Current Outpatient Medications on File Prior to Visit   Medication Sig    Aspirin Low Dose 81 MG EC tablet     carvedilol (COREG) 12 5 mg tablet     ezetimibe-simvastatin (VYTORIN) 10-40 mg per tablet     levothyroxine 175 mcg tablet     lisinopril (ZESTRIL) 20 mg tablet Take 20 mg by mouth daily    metFORMIN (GLUCOPHAGE) 1000 MG tablet     ofloxacin (FLOXIN) 0 3 % otic solution Administer 10 drops into both ears 2 (two) times a day (Patient not taking: Reported on 7/1/2021)     No current facility-administered medications on file prior to visit  She is allergic to kiwi extract - food allergy and latex       Review of Systems   Constitutional: Negative for chills, fatigue and fever  HENT: Negative for congestion, ear pain, hearing loss, nosebleeds, postnasal drip, rhinorrhea, sinus pressure, sinus pain, sneezing and sore throat  Eyes: Negative for pain, discharge, itching and visual disturbance  Respiratory: Positive for shortness of breath  Negative for cough, chest tightness and wheezing  Cardiovascular: Negative for chest pain, palpitations and leg swelling  Gastrointestinal: Negative for abdominal pain, blood in stool, constipation, diarrhea, nausea and vomiting  Genitourinary: Negative for frequency and urgency  Musculoskeletal: Negative  Skin: Negative  Neurological: Negative for dizziness, light-headedness and numbness  Psychiatric/Behavioral: Negative  Objective:      /82   Pulse 85   Temp 97 8 °F (36 6 °C) (Temporal)   Ht 5' 6" (1 676 m)   Wt 132 kg (291 lb 6 4 oz)   SpO2 97%   BMI 47 03 kg/m²          Physical Exam  Vitals and nursing note reviewed  Constitutional:       General: She is not in acute distress  Appearance: Normal appearance  HENT:      Head: Normocephalic and atraumatic  Right Ear: Tympanic membrane, ear canal and external ear normal       Left Ear: Tympanic membrane, ear canal and external ear normal       Nose: Nose normal       Mouth/Throat:      Mouth: Mucous membranes are moist       Pharynx: Oropharynx is clear  No oropharyngeal exudate or posterior oropharyngeal erythema  Eyes:      Pupils: Pupils are equal, round, and reactive to light  Cardiovascular:      Rate and Rhythm: Normal rate and regular rhythm  Pulses: Normal pulses   no weak pulses          Dorsalis pedis pulses are 2+ on the right side and 2+ on the left side  Posterior tibial pulses are 2+ on the right side and 2+ on the left side  Heart sounds: Normal heart sounds  No murmur heard  Pulmonary:      Effort: Pulmonary effort is normal  No respiratory distress  Breath sounds: Normal breath sounds  No wheezing, rhonchi or rales  Abdominal:      General: Bowel sounds are normal  There is no distension  Palpations: Abdomen is soft  Tenderness: There is no abdominal tenderness  There is no guarding  Musculoskeletal:         General: Normal range of motion  Cervical back: Normal range of motion and neck supple  Right lower leg: Edema present  Left lower leg: Edema present  Feet:      Right foot:      Skin integrity: No ulcer, skin breakdown, erythema, warmth, callus or dry skin  Left foot:      Skin integrity: No ulcer, skin breakdown, erythema, warmth, callus or dry skin  Skin:     General: Skin is warm and dry  Comments: Stasis dermatitis of BLE   Neurological:      Mental Status: She is alert and oriented to person, place, and time  Psychiatric:         Mood and Affect: Mood and affect normal            Diabetic Foot Exam    Patient's shoes and socks removed  Right Foot/Ankle   Right Foot Inspection  Skin Exam: skin normal and skin intact no dry skin, no warmth, no callus, no erythema, no maceration, no abnormal color, no pre-ulcer, no ulcer and no callus                          Toe Exam: ROM and strength within normal limits  Sensory   Vibration: intact  Proprioception: intact   Monofilament testing: intact  Vascular  Capillary refills: < 3 seconds  The right DP pulse is 2+  The right PT pulse is 2+       Left Foot/Ankle  Left Foot Inspection  Skin Exam: skin normal and skin intactno dry skin, no warmth, no erythema, no maceration, normal color, no pre-ulcer, no ulcer and no callus                         Toe Exam: ROM and strength within normal limits                   Sensory Vibration: intact  Proprioception: intact  Monofilament: intact  Vascular  Capillary refills: < 3 seconds  The left DP pulse is 2+  The left PT pulse is 2+  Assign Risk Category:  No deformity present; No loss of protective sensation; No weak pulses       Risk: 0      BMI Counseling: Body mass index is 47 03 kg/m²  The BMI is above normal  Nutrition recommendations include reducing portion sizes, decreasing overall calorie intake, moderation in carbohydrate intake, increasing intake of lean protein, reducing intake of saturated fat and trans fat and reducing intake of cholesterol  Exercise recommendations include moderate aerobic physical activity for 150 minutes/week

## 2021-07-01 NOTE — LETTER
July 1, 2021     Patient: Cristin Guerra   YOB: 1960   Date of Visit: 7/1/2021       To Whom it May Concern:    Cesar Paz is under my professional care  She was seen in my office on 7/1/2021  Please allow Prudis to take the following medications  Aspirin 81mg, Carvedilol 12 5mg, ezetimibe-simvastatin 10-40mg, levothyroxine 175mcg, lisinopril 20mg, metformin 1000mg    If you have any questions or concerns, please don't hesitate to call           Sincerely,          Elena Castro PA-C        CC: No Recipients

## 2021-07-02 LAB
LEFT EYE DIABETIC RETINOPATHY: NORMAL
LEFT EYE IMAGE QUALITY: NORMAL
LEFT EYE MACULAR EDEMA: NORMAL
LEFT EYE OTHER RETINOPATHY: NORMAL
RIGHT EYE DIABETIC RETINOPATHY: NORMAL
RIGHT EYE IMAGE QUALITY: NORMAL
RIGHT EYE MACULAR EDEMA: NORMAL
RIGHT EYE OTHER RETINOPATHY: NORMAL
SEVERITY (EYE EXAM): NORMAL

## 2021-07-02 PROCEDURE — 2025F 7 FLD RTA PHOTO W/O RTNOPTHY: CPT | Performed by: PHYSICIAN ASSISTANT

## 2021-07-05 ENCOUNTER — APPOINTMENT (OUTPATIENT)
Dept: LAB | Facility: CLINIC | Age: 61
End: 2021-07-05
Payer: COMMERCIAL

## 2021-07-05 DIAGNOSIS — E11.9 TYPE 2 DIABETES MELLITUS WITHOUT COMPLICATION, WITHOUT LONG-TERM CURRENT USE OF INSULIN (HCC): ICD-10-CM

## 2021-07-05 DIAGNOSIS — Z86.2 HISTORY OF ANEMIA: ICD-10-CM

## 2021-07-05 DIAGNOSIS — E03.9 HYPOTHYROIDISM, UNSPECIFIED TYPE: ICD-10-CM

## 2021-07-05 DIAGNOSIS — E78.2 MIXED HYPERLIPIDEMIA: ICD-10-CM

## 2021-07-05 PROCEDURE — 85025 COMPLETE CBC W/AUTO DIFF WBC: CPT

## 2021-07-05 PROCEDURE — 84443 ASSAY THYROID STIM HORMONE: CPT

## 2021-07-05 PROCEDURE — 82043 UR ALBUMIN QUANTITATIVE: CPT | Performed by: PHYSICIAN ASSISTANT

## 2021-07-05 PROCEDURE — 36415 COLL VENOUS BLD VENIPUNCTURE: CPT

## 2021-07-05 PROCEDURE — 80053 COMPREHEN METABOLIC PANEL: CPT

## 2021-07-05 PROCEDURE — 82570 ASSAY OF URINE CREATININE: CPT | Performed by: PHYSICIAN ASSISTANT

## 2021-07-05 PROCEDURE — 80061 LIPID PANEL: CPT

## 2021-07-06 LAB
ALBUMIN SERPL BCP-MCNC: 3.3 G/DL (ref 3.5–5)
ALP SERPL-CCNC: 92 U/L (ref 46–116)
ALT SERPL W P-5'-P-CCNC: 44 U/L (ref 12–78)
ANION GAP SERPL CALCULATED.3IONS-SCNC: 6 MMOL/L (ref 4–13)
AST SERPL W P-5'-P-CCNC: 27 U/L (ref 5–45)
BASOPHILS # BLD AUTO: 0.04 THOUSANDS/ΜL (ref 0–0.1)
BASOPHILS NFR BLD AUTO: 1 % (ref 0–1)
BILIRUB SERPL-MCNC: 0.45 MG/DL (ref 0.2–1)
BUN SERPL-MCNC: 18 MG/DL (ref 5–25)
CALCIUM ALBUM COR SERPL-MCNC: 9.7 MG/DL (ref 8.3–10.1)
CALCIUM SERPL-MCNC: 9.1 MG/DL (ref 8.3–10.1)
CHLORIDE SERPL-SCNC: 107 MMOL/L (ref 100–108)
CHOLEST SERPL-MCNC: 146 MG/DL (ref 50–200)
CO2 SERPL-SCNC: 26 MMOL/L (ref 21–32)
CREAT SERPL-MCNC: 0.72 MG/DL (ref 0.6–1.3)
CREAT UR-MCNC: 77.6 MG/DL
EOSINOPHIL # BLD AUTO: 0.1 THOUSAND/ΜL (ref 0–0.61)
EOSINOPHIL NFR BLD AUTO: 2 % (ref 0–6)
ERYTHROCYTE [DISTWIDTH] IN BLOOD BY AUTOMATED COUNT: 15.9 % (ref 11.6–15.1)
GFR SERPL CREATININE-BSD FRML MDRD: 92 ML/MIN/1.73SQ M
GLUCOSE P FAST SERPL-MCNC: 108 MG/DL (ref 65–99)
HCT VFR BLD AUTO: 38.8 % (ref 34.8–46.1)
HDLC SERPL-MCNC: 61 MG/DL
HGB BLD-MCNC: 11.8 G/DL (ref 11.5–15.4)
IMM GRANULOCYTES # BLD AUTO: 0.02 THOUSAND/UL (ref 0–0.2)
IMM GRANULOCYTES NFR BLD AUTO: 0 % (ref 0–2)
LDLC SERPL CALC-MCNC: 71 MG/DL (ref 0–100)
LYMPHOCYTES # BLD AUTO: 1.87 THOUSANDS/ΜL (ref 0.6–4.47)
LYMPHOCYTES NFR BLD AUTO: 28 % (ref 14–44)
MCH RBC QN AUTO: 29.1 PG (ref 26.8–34.3)
MCHC RBC AUTO-ENTMCNC: 30.4 G/DL (ref 31.4–37.4)
MCV RBC AUTO: 96 FL (ref 82–98)
MICROALBUMIN UR-MCNC: 6.7 MG/L (ref 0–20)
MICROALBUMIN/CREAT 24H UR: 9 MG/G CREATININE (ref 0–30)
MONOCYTES # BLD AUTO: 0.6 THOUSAND/ΜL (ref 0.17–1.22)
MONOCYTES NFR BLD AUTO: 9 % (ref 4–12)
NEUTROPHILS # BLD AUTO: 4.01 THOUSANDS/ΜL (ref 1.85–7.62)
NEUTS SEG NFR BLD AUTO: 60 % (ref 43–75)
NRBC BLD AUTO-RTO: 0 /100 WBCS
PLATELET # BLD AUTO: 139 THOUSANDS/UL (ref 149–390)
PMV BLD AUTO: 12.8 FL (ref 8.9–12.7)
POTASSIUM SERPL-SCNC: 4.8 MMOL/L (ref 3.5–5.3)
PROT SERPL-MCNC: 7.1 G/DL (ref 6.4–8.2)
RBC # BLD AUTO: 4.06 MILLION/UL (ref 3.81–5.12)
SODIUM SERPL-SCNC: 139 MMOL/L (ref 136–145)
TRIGL SERPL-MCNC: 68 MG/DL
TSH SERPL DL<=0.05 MIU/L-ACNC: 2.33 UIU/ML (ref 0.36–3.74)
WBC # BLD AUTO: 6.64 THOUSAND/UL (ref 4.31–10.16)

## 2021-07-06 PROCEDURE — 3061F NEG MICROALBUMINURIA REV: CPT | Performed by: PHYSICIAN ASSISTANT

## 2021-07-29 ENCOUNTER — TELEPHONE (OUTPATIENT)
Dept: CARDIOLOGY CLINIC | Facility: CLINIC | Age: 61
End: 2021-07-29

## 2021-08-13 DIAGNOSIS — R06.00 DOE (DYSPNEA ON EXERTION): ICD-10-CM

## 2021-08-13 DIAGNOSIS — I25.10 CORONARY ARTERY DISEASE INVOLVING NATIVE CORONARY ARTERY OF NATIVE HEART WITHOUT ANGINA PECTORIS: Primary | ICD-10-CM

## 2021-08-13 DIAGNOSIS — I10 ESSENTIAL HYPERTENSION: ICD-10-CM

## 2021-08-13 DIAGNOSIS — E11.9 TYPE 2 DIABETES MELLITUS WITHOUT COMPLICATION, WITHOUT LONG-TERM CURRENT USE OF INSULIN (HCC): ICD-10-CM

## 2021-08-13 DIAGNOSIS — E03.9 HYPOTHYROIDISM, UNSPECIFIED TYPE: ICD-10-CM

## 2021-08-13 DIAGNOSIS — E78.2 MIXED HYPERLIPIDEMIA: ICD-10-CM

## 2021-08-13 PROCEDURE — 4010F ACE/ARB THERAPY RXD/TAKEN: CPT | Performed by: OBSTETRICS & GYNECOLOGY

## 2021-08-13 RX ORDER — ASPIRIN 81 MG/1
81 TABLET, COATED ORAL DAILY
Qty: 90 TABLET | Refills: 1 | Status: SHIPPED | OUTPATIENT
Start: 2021-08-13 | End: 2022-02-16

## 2021-08-13 RX ORDER — LEVOTHYROXINE SODIUM 175 UG/1
175 TABLET ORAL DAILY
Qty: 90 TABLET | Refills: 1 | Status: SHIPPED | OUTPATIENT
Start: 2021-08-13 | End: 2022-05-31 | Stop reason: SDUPTHER

## 2021-08-13 RX ORDER — CARVEDILOL 12.5 MG/1
12.5 TABLET ORAL 2 TIMES DAILY
Qty: 180 TABLET | Refills: 1 | Status: SHIPPED | OUTPATIENT
Start: 2021-08-13 | End: 2022-05-31 | Stop reason: SDUPTHER

## 2021-08-13 RX ORDER — EZETIMIBE AND SIMVASTATIN 10; 40 MG/1; MG/1
1 TABLET ORAL
Qty: 90 TABLET | Refills: 1 | Status: SHIPPED | OUTPATIENT
Start: 2021-08-13 | End: 2022-05-31 | Stop reason: SDUPTHER

## 2021-08-13 RX ORDER — LISINOPRIL 20 MG/1
20 TABLET ORAL DAILY
Qty: 90 TABLET | Refills: 1 | Status: SHIPPED | OUTPATIENT
Start: 2021-08-13 | End: 2022-05-31 | Stop reason: SDUPTHER

## 2021-08-17 ENCOUNTER — CONSULT (OUTPATIENT)
Dept: CARDIOLOGY CLINIC | Facility: CLINIC | Age: 61
End: 2021-08-17
Payer: COMMERCIAL

## 2021-08-17 VITALS
BODY MASS INDEX: 47.09 KG/M2 | HEART RATE: 67 BPM | WEIGHT: 293 LBS | OXYGEN SATURATION: 99 % | SYSTOLIC BLOOD PRESSURE: 108 MMHG | DIASTOLIC BLOOD PRESSURE: 62 MMHG | HEIGHT: 66 IN

## 2021-08-17 DIAGNOSIS — Z95.5 PRESENCE OF STENT IN LAD CORONARY ARTERY: ICD-10-CM

## 2021-08-17 DIAGNOSIS — I25.10 CORONARY ARTERY DISEASE INVOLVING NATIVE CORONARY ARTERY OF NATIVE HEART WITHOUT ANGINA PECTORIS: Primary | ICD-10-CM

## 2021-08-17 DIAGNOSIS — I10 ESSENTIAL HYPERTENSION: ICD-10-CM

## 2021-08-17 DIAGNOSIS — E78.2 MIXED HYPERLIPIDEMIA: ICD-10-CM

## 2021-08-17 PROCEDURE — 93000 ELECTROCARDIOGRAM COMPLETE: CPT | Performed by: INTERNAL MEDICINE

## 2021-08-17 PROCEDURE — 99204 OFFICE O/P NEW MOD 45 MIN: CPT | Performed by: INTERNAL MEDICINE

## 2021-08-17 NOTE — PROGRESS NOTES
Cardiology Consultation     Bimal Wade  [id number]  1960  [facility] [address] [place] [address]    HPI: 52-year-old lady who had a stent placed in the mid LAD in 2010 in [place]  At that time she was having terrible pain in the chest with walking  Since then she has not had any more such discomfort  Unfortunately, she is not very active although she does go walk in the mall with her daughter once or twice a week  She also is morbidly obese  She has had type 2 diabetes and high blood pressure  There is no family history of heart disease  She is not smoker  She does have hyperlipidemia and she takes Vytorin  1  Coronary artery disease involving native coronary artery of native heart without angina pectoris  -     Ambulatory referral to Cardiology  -     Stress test only, exercise; Future; Expected date: 08/17/2021  -     POCT ECG    2  Presence of stent in LAD coronary artery  -     Ambulatory referral to Cardiology    3  Essential hypertension  -     Ambulatory referral to Cardiology    4   Mixed hyperlipidemia  -     Ambulatory referral to Cardiology      Patient Active Problem List   Diagnosis    Coronary artery disease involving native coronary artery of native heart without angina pectoris    Presence of stent in LAD coronary artery    Hypothyroidism    Essential hypertension    Mixed hyperlipidemia    Type 2 diabetes mellitus without complication, without long-term current use of insulin ([address] )    NOLASCO (dyspnea on exertion)    Class 3 severe obesity due to excess calories with serious comorbidity and body mass index (BMI) of 45 0 to 49 9 in adult [facility])    Varicose veins of both lower extremities with pain    BRCA positive    History of anemia     Past Medical History:   Diagnosis Date    Diabetes mellitus ([address] )     Disease of thyroid gland     Hypertension      Social History     Socioeconomic History    Marital status: /Civil Union     Spouse name: Not on file    Number of children: Not on file    Years of education: Not on file    Highest education level: Not on file   Occupational History    Not on file   Tobacco Use    Smoking status: Never Smoker    Smokeless tobacco: Never Used   Vaping Use    Vaping Use: Never used   Substance and Sexual Activity    Alcohol use: Never    Drug use: Never    Sexual activity: Not on file   Other Topics Concern    Not on file   Social History Narrative    Not on file     Social Determinants of Health     Financial Resource Strain:     Difficulty of Paying Living Expenses:    Food Insecurity:     Worried About Running Out of Food in the Last Year:     [address] in the Last Year:    Transportation Needs:     Lack of Transportation (Medical):  Lack of Transportation (Non-Medical):    Physical Activity:     Days of Exercise per Week:     Minutes of Exercise per Session:    Stress:     Feeling of Stress :    Social Connections:     Frequency of Communication with Friends and Family:     Frequency of Social Gatherings with Friends and Family:     Attends [employer] Services:     Active Member of Clubs or Organizations:     Attends Club or Organization Meetings:     Marital Status:    Intimate Partner Violence:     Fear of Current or Ex-Partner:     Emotionally Abused:     Physically Abused:     Sexually Abused:       History reviewed  No pertinent family history    Past Surgical History:   Procedure Laterality Date    CARDIAC SURGERY         Current Outpatient Medications:     Aspirin Low Dose 81 MG EC tablet, Take 1 tablet (81 mg total) by mouth daily, Disp: 90 tablet, Rfl: 1    carvedilol (COREG) 12 5 mg tablet, Take 1 tablet (12 5 mg total) by mouth 2 (two) times a day, Disp: 180 tablet, Rfl: 1    ezetimibe-simvastatin (VYTORIN) 10-40 mg per tablet, Take 1 tablet by mouth daily at bedtime, Disp: 90 tablet, Rfl: 1    levothyroxine 175 mcg tablet, Take 1 tablet (175 mcg total) by mouth daily Patient takes 7 1/2 tablets by mouth per week    One daily and on Sunday 1 5 tablet, Disp: 90 tablet, Rfl: 1    lisinopril (ZESTRIL) 20 mg tablet, Take 1 tablet (20 mg total) by mouth daily, Disp: 90 tablet, Rfl: 1    metFORMIN (GLUCOPHAGE) 1000 MG tablet, Take 1 tablet (1,000 mg total) by mouth 2 (two) times a day with meals, Disp: 180 tablet, Rfl: 1    ofloxacin (FLOXIN) 0 3 % otic solution, Administer 10 drops into both ears 2 (two) times a day, Disp: 5 mL, Rfl: 0  Allergies   Allergen Reactions    Kiwi Extract - Food Allergy Anaphylaxis    Latex Hives     Vitals:    08/17/21 1016   BP: 108/62   BP Location: Left arm   Patient Position: Sitting   Cuff Size: Large   Pulse: 67   SpO2: 99%   Weight: 133 kg (293 lb)   Height: 5' 6" (1 676 m)       Labs:  Appointment on 07/05/2021   Component Date Value    Sodium 07/05/2021 139     Potassium 07/05/2021 4 8     Chloride 07/05/2021 107     CO2 07/05/2021 26     ANION GAP 07/05/2021 6     BUN 07/05/2021 18     Creatinine 07/05/2021 0 72     Glucose, Fasting 07/05/2021 108*    Calcium 07/05/2021 9 1     Corrected Calcium 07/05/2021 9 7     AST 07/05/2021 27     ALT 07/05/2021 44     Alkaline Phosphatase 07/05/2021 92     Total Protein 07/05/2021 7 1     Albumin 07/05/2021 3 3*    Total Bilirubin 07/05/2021 0 45     eGFR 07/05/2021 92     Cholesterol 07/05/2021 146     Triglycerides 07/05/2021 68     HDL, Direct 07/05/2021 61     LDL Calculated 07/05/2021 71     TSH 3RD GENERATON 07/05/2021 2 330     WBC 07/05/2021 6 64     RBC 07/05/2021 4 06     Hemoglobin 07/05/2021 11 8     Hematocrit 07/05/2021 38 8     MCV 07/05/2021 96     MCH 07/05/2021 29 1     MCHC 07/05/2021 30 4*    RDW 07/05/2021 15 9*    MPV 07/05/2021 12 8*    Platelets 86/74/9121 139*    nRBC 07/05/2021 0     Neutrophils Relative 07/05/2021 60     Immat GRANS % 07/05/2021 0  Lymphocytes Relative 2021 28     Monocytes Relative 2021 9     Eosinophils Relative 2021 2     Basophils Relative 2021 1     Neutrophils Absolute 2021 4 01     Immature Grans Absolute 2021 0 02     Lymphocytes Absolute 2021 1 87     Monocytes Absolute 2021 0 60     Eosinophils Absolute 2021 0 10     Basophils Absolute 2021 0 04    Office Visit on 2021   Component Date Value    Hemoglobin A1C 2021 6 2     Creatinine, Ur 2021 77 6     Microalbum  ,U,Random 2021 6 7     Microalb Creat Ratio 2021 9     Severity 2021 NORMAL     Right Eye Diabetic Retin* 2021 None     Right Eye Macular Edema 2021 None     Right Eye Other Retinopa* 2021 None     Right Eye Image Quality 2021 Gradeable Image     Left Eye Diabetic Retino* 2021 None     Left Eye Macular Edema 2021 None     Left Eye Other Retinopat* 2021 None     Left Eye Image Quality 2021 Gradeable Image     Result 2021 Retinal Study Result for Wilda Geronimo     Result 2021 tiana Garibay 63 y/o, F (: 1960, MRN: [id number])     Result 2021 presented to [address] on 2021 for a retinal imaging study of the left and right eyes   Result 2021 Based on the findings of the study, the following is recommended for [facility]     Result 2021 Normal Scan: Please advise the patient to return for a scan in 12 months       Result 2021 Interpreting Provider's Comments:  No comments provided     Result 2021 Diagnoses Present: E119 - Type 2 diabetes mellitus without complications     Result 2021 Right eye findings: Negative for Diabetic Retinopathy     Result 2021 Negative for Macular Edema     Result 2021 Left eye findings: Negative for Diabetic Retinopathy     Result 2021 Negative for Macular Edema  Result 07/01/2021 This result was electronically signed by Emerald Cedeno MD, NPI: [id number], Taxonomy: [id number] on 07- 02:13 [facility]   Result 07/01/2021 NOTE:  Any pathology noted on this diabetic retinal evaluation should be confirmed by an appropriate ophthalmic examination  Imaging: No results found  Review of Systems:  Review of Systems   Constitutional: Negative for appetite change and fever  Eyes: Negative for visual disturbance  Respiratory: Negative for cough, chest tightness and shortness of breath  Cardiovascular: Positive for leg swelling  Negative for chest pain and palpitations  Endocrine:        History of radio isotope treatment of hyperthyroidism   Genitourinary: Negative for hematuria and menstrual problem  Musculoskeletal: Negative for arthralgias and back pain  Skin: Negative for color change and rash  Neurological: Negative for syncope and light-headedness  All other systems reviewed and are negative  Physical Exam:    Morbidly obese  Pleasant  Looks healthy  Blood pressure is 108/62  Skin is warm dry  Pupils equal   Carotids 2+ without bruits  No neck masses  Lungs clear  Rhythm regular  No murmurs or gallops  Bowel sounds active  No abdominal masses  Good pulses  Trace peripheral edema and signs of venous insufficiency  Good strength in all extremities  Discussion/Summary:    Impression:     1  Coronary artery disease with mid LAD stent but no angina at this time  2  Hypertension -essential  3  Morbid obesity    Recommendations:    Obviously, I recommended she lose weight  I also recommended that she exercise for 20-30 minutes daily with walking  We will get a stress test a plot out where she is with regard to her coronary artery disease at this time  She will return in 6 weeks for follow-up visit              Courtney Becerra MD

## 2021-08-18 LAB
BASOPHILS # BLD AUTO: 41 CELLS/UL (ref 0–200)
BASOPHILS NFR BLD AUTO: 0.6 %
EOSINOPHIL # BLD AUTO: 129 CELLS/UL (ref 15–500)
EOSINOPHIL NFR BLD AUTO: 1.9 %
ERYTHROCYTE [DISTWIDTH] IN BLOOD BY AUTOMATED COUNT: 14.6 % (ref 11–15)
HCT VFR BLD AUTO: 35.3 % (ref 35–45)
HGB BLD-MCNC: 11.4 G/DL (ref 11.7–15.5)
LYMPHOCYTES # BLD AUTO: 2006 CELLS/UL (ref 850–3900)
LYMPHOCYTES NFR BLD AUTO: 29.5 %
MCH RBC QN AUTO: 29.2 PG (ref 27–33)
MCHC RBC AUTO-ENTMCNC: 32.3 G/DL (ref 32–36)
MCV RBC AUTO: 90.3 FL (ref 80–100)
MONOCYTES # BLD AUTO: 714 CELLS/UL (ref 200–950)
MONOCYTES NFR BLD AUTO: 10.5 %
NEUTROPHILS # BLD AUTO: 3910 CELLS/UL (ref 1500–7800)
NEUTROPHILS NFR BLD AUTO: 57.5 %
PLATELET # BLD AUTO: 149 THOUSAND/UL (ref 140–400)
PMV BLD REES-ECKER: 12 FL (ref 7.5–12.5)
RBC # BLD AUTO: 3.91 MILLION/UL (ref 3.8–5.1)
WBC # BLD AUTO: 6.8 THOUSAND/UL (ref 3.8–10.8)

## 2021-08-30 NOTE — PATIENT INSTRUCTIONS
Breast Self Exam for Women   AMBULATORY CARE:   A breast self-exam (BSE)  is a way to check your breasts for lumps and other changes  Regular BSEs can help you know how your breasts normally look and feel  Most breast lumps or changes are not cancer, but you should always have them checked by a healthcare provider  Why you should do a BSE:  Breast cancer is the most common type of cancer in women  Even if you have mammograms, you may still want to do a BSE regularly  If you know how your breasts normally feel and look, it may help you know when to contact your healthcare provider  Mammograms can miss some cancers  You may find a lump during a BSE that did not show up on a mammogram   When you should do a BSE:  If you have periods, you may want to do your BSE 1 week after your period ends  This is the time when your breasts may be the least swollen, lumpy, or tender  You can do regular BSEs even if you are breastfeeding or have breast implants  Call your doctor if:   · You find any lumps or changes in your breasts  · You have breast pain or fluid coming from your nipples  · You have questions or concerns about your condition or care  How to do a BSE:       · Look at your breasts in a mirror  Look at the size and shape of each breast and nipple  Check for swelling, lumps, dimpling, scaly skin, or other skin changes  Look for nipple changes, such as a nipple that is painful or beginning to pull inward  Gently squeeze both nipples and check to see if fluid (that is not breast milk) comes out of them  If you find any of these or other breast changes, contact your healthcare provider  Check your breasts while you sit or  the following 3 positions:    ? Hang your arms down at your sides  ? Raise your hands and join them behind your head  ? Put firm pressure with your hands on your hips  Bend slightly forward while you look at your breasts in the mirror  · Lie down and feel your breasts    When you lie down, your breast tissue spreads out evenly over your chest  This makes it easier for you to feel for lumps and anything that may not be normal for your breasts  Do a BSE on one breast at a time  ? Place a small pillow or towel under your left shoulder  Put your left arm behind your head  ? Use the 3 middle fingers of your right hand  Use your fingertip pads, on the top of your fingers  Your fingertip pad is the most sensitive part of your finger  ? Use small circles to feel your breast tissue  Use your fingertip pads to make dime-sized, overlapping circles on your breast and armpits  Use light, medium, and firm pressure  First, press lightly  Second, press with medium pressure to feel a little deeper into the breast  Last, use firm pressure to feel deep within your breast     ? Examine your entire breast area  Examine the breast area from above the breast to below the breast where you feel only ribs  Make small circles with your fingertips, starting in the middle of your armpit  Make circles going up and down the breast area  Continue toward your breast and all the way across it  Examine the area from your armpit all the way over to the middle of your chest (breastbone)  Stop at the middle of your chest     ? Move the pillow or towel to your right shoulder, and put your right arm behind your head  Use the 3 fingertip pads of your left hand, and repeat the above steps to do a BSE on your right breast   What else you can do to check for breast problems or cancer:  Talk to your healthcare provider about mammograms  A mammogram is an x-ray of your breasts to screen for breast cancer or other problems  Your provider can tell you the benefits and risks of mammograms  The first mammogram is usually at age 39 or 48  Your provider may recommend you start at 36 or younger if your risk for breast cancer is high  Mammograms usually continue every 1 to 2 years until age 76       Follow up with your doctor as directed:  Write down your questions so you remember to ask them during your visits  © Copyright [employer] 2021 Information is for End User's use only and may not be sold, redistributed or otherwise used for commercial purposes  All illustrations and images included in CareNotes® are the copyrighted property of A D A M [employer] Inc  or Shanon Boston  The above information is an  only  It is not intended as medical advice for individual conditions or treatments  Talk to your doctor, nurse or pharmacist before following any medical regimen to see if it is safe and effective for you  Wellness Visit for Adults   AMBULATORY CARE:   A wellness visit  is when you see your healthcare provider to get screened for health problems  Your healthcare provider will also give you advice on how to stay healthy  Write down your questions so you remember to ask them  Ask your healthcare provider how often you should have a wellness visit  What happens at a wellness visit:  Your healthcare provider will ask about your health, and your family history of health problems  This includes high blood pressure, heart disease, and cancer  He or she will ask if you have symptoms that concern you, if you smoke, and about your mood  You may also be asked about your intake of medicines, supplements, food, and alcohol  Any of the following may be done:  · Your weight  will be checked  Your height may also be checked so your body mass index (BMI) can be calculated  Your BMI shows if you are at a healthy weight  · Your blood pressure  and heart rate will be checked  Your temperature may also be checked  · Blood and urine tests  may be done  Blood tests may be done to check your cholesterol levels  Abnormal cholesterol levels increase your risk for heart disease and stroke  You may also need a blood or urine test to check for diabetes if you are at increased risk   Urine tests may be done to look for signs of an infection or kidney disease  · A physical exam  includes checking your heartbeat and lungs with a stethoscope  Your healthcare provider may also check your skin to look for sun damage  · Screening tests  may be recommended  A screening test is done to check for diseases that may not cause symptoms  The screening tests you may need depend on your age, gender, family history, and lifestyle habits  For example, colorectal screening may be recommended if you are 48years old or older  Screening tests you need if you are a woman:   · A Pap smear  is used to screen for cervical cancer  Pap smears are usually done every 3 to 5 years depending on your age  You may need them more often if you have had abnormal Pap smear test results in the past  Ask your healthcare provider how often you should have a Pap smear  · A mammogram  is an x-ray of your breasts to screen for breast cancer  Experts recommend mammograms every 2 years starting at age 48 years  You may need a mammogram at age 52 years or younger if you have an increased risk for breast cancer  Talk to your healthcare provider about when you should start having mammograms and how often you need them  Vaccines you may need:   · Get an influenza vaccine  every year  The influenza vaccine protects you from the flu  Several types of viruses cause the flu  The viruses change over time, so new vaccines are made each year  · Get a tetanus-diphtheria (Td) booster vaccine  every 10 years  This vaccine protects you against tetanus and diphtheria  Tetanus is a severe infection that may cause painful muscle spasms and lockjaw  Diphtheria is a severe bacterial infection that causes a thick covering in the back of your mouth and throat  · Get a human papillomavirus (HPV) vaccine  if you are female and aged 23 to 32 or male 23 to 24 and never received it  This vaccine protects you from HPV infection  HPV is the most common infection spread by sexual contact   HPV may also cause vaginal, penile, and anal cancers  · Get a pneumococcal vaccine  if you are aged 72 years or older  The pneumococcal vaccine is an injection given to protect you from pneumococcal disease  Pneumococcal disease is an infection caused by pneumococcal bacteria  The infection may cause pneumonia, meningitis, or an ear infection  · Get a shingles vaccine  if you are 60 or older, even if you have had shingles before  The shingles vaccine is an injection to protect you from the varicella-zoster virus  This is the same virus that causes chickenpox  Shingles is a painful rash that develops in people who had chickenpox or have been exposed to the virus  How to eat healthy:  My Plate is a model for planning healthy meals  It shows the types and amounts of foods that should go on your plate  Fruits and vegetables make up about half of your plate, and grains and protein make up the other half  A serving of dairy is included on the side of your plate  The amount of calories and serving sizes you need depends on your age, gender, weight, and height  Examples of healthy foods are listed below:  · Eat a variety of vegetables  such as dark green, red, and orange vegetables  You can also include canned vegetables low in sodium (salt) and frozen vegetables without added butter or sauces  · Eat a variety of fresh fruits , canned fruit in 100% juice, frozen fruit, and dried fruit  · Include whole grains  At least half of the grains you eat should be whole grains  Examples include whole-wheat bread, wheat pasta, brown rice, and whole-grain cereals such as oatmeal     · Eat a variety of protein foods such as seafood (fish and shellfish), lean meat, and poultry without skin (turkey and chicken)  Examples of lean meats include pork leg, shoulder, or tenderloin, and beef round, sirloin, tenderloin, and extra lean ground beef   Other protein foods include eggs and egg substitutes, beans, peas, soy products, nuts, and seeds     · Choose low-fat dairy products such as skim or 1% milk or low-fat yogurt, cheese, and cottage cheese  · Limit unhealthy fats  such as butter, hard margarine, and shortening  Exercise:  Exercise at least 30 minutes per day on most days of the week  Some examples of exercise include walking, biking, dancing, and swimming  You can also fit in more physical activity by taking the stairs instead of the elevator or parking farther away from stores  Include muscle strengthening activities 2 days each week  Regular exercise provides many health benefits  It helps you manage your weight, and decreases your risk for type 2 diabetes, heart disease, stroke, and high blood pressure  Exercise can also help improve your mood  Ask your healthcare provider about the best exercise plan for you  General health and safety guidelines:   · Do not smoke  Nicotine and other chemicals in cigarettes and cigars can cause lung damage  Ask your healthcare provider for information if you currently smoke and need help to quit  E-cigarettes or smokeless tobacco still contain nicotine  Talk to your healthcare provider before you use these products  · Limit alcohol  A drink of alcohol is 12 ounces of beer, 5 ounces of wine, or 1½ ounces of liquor  · Lose weight, if needed  Being overweight increases your risk of certain health conditions  These include heart disease, high blood pressure, type 2 diabetes, and certain types of cancer  · Protect your skin  Do not sunbathe or use tanning beds  Use sunscreen with a SPF 15 or higher  Apply sunscreen at least 15 minutes before you go outside  Reapply sunscreen every 2 hours  Wear protective clothing, hats, and sunglasses when you are outside  · Drive safely  Always wear your seatbelt  Make sure everyone in your car wears a seatbelt  A seatbelt can save your life if you are in an accident  Do not use your cell phone when you are driving   This could distract you and cause an accident  Pull over if you need to make a call or send a text message  · Practice safe sex  Use latex condoms if are sexually active and have more than one partner  Your healthcare provider may recommend screening tests for sexually transmitted infections (STIs)  · Wear helmets, lifejackets, and protective gear  Always wear a helmet when you ride a bike or motorcycle, go skiing, or play sports that could cause a head injury  Wear protective equipment when you play sports  Wear a lifejacket when you are on a boat or doing water sports  © Copyright [employer] 2021 Information is for End User's use only and may not be sold, redistributed or otherwise used for commercial purposes  All illustrations and images included in CareNotes® are the copyrighted property of A D A M , Inc  or Shanon Travis   The above information is an  only  It is not intended as medical advice for individual conditions or treatments  Talk to your doctor, nurse or pharmacist before following any medical regimen to see if it is safe and effective for you  Kegel Exercises for Women   AMBULATORY CARE:   Kegel exercises  help strengthen your pelvic muscles  Pelvic muscles hold your pelvic organs, such as your bladder and uterus, in place  Kegel exercises help prevent or control problems with urine incontinence (leakage)  Incontinence may be caused by pregnancy, childbirth, or menopause  Contact your healthcare provider if:   · You cannot feel your muscles tighten or relax  · You continue to leak urine  · You have questions or concerns about your condition or care  Use the correct muscles:  Pelvic muscles are the muscles you use to control urine flow  To target these muscles, stop and start the flow of urine several times  This will help you become familiar with how it feels to tighten and relax these muscles  How to do Kegel exercises:   · Empty your bladder   You may lie down, stand up, or sit down to do these exercises  When you first try to do these exercises, it may be easier if you lie down  Tighten or squeeze your pelvic muscles slowly  It may feel like you are trying to hold back urine or gas  Hold this position for 3 seconds  Relax for 3 seconds  Repeat this cycle 10 times  · Do 10 sets of Kegel exercises, at least 3 times a day  Do not hold your breath when you do Kegel exercises  Keep your stomach, back, and leg muscles relaxed  · As your muscles get stronger, you will be able to hold the squeeze longer  Your healthcare provider may ask that you increase your pelvic muscle squeeze to 10 seconds  After you squeeze for 10 seconds, relax for 10 seconds  What else you should know:   · Once you know how to do Kegel exercises, use different positions  You can do these exercises while you lie on the floor, sit at your desk or watch TV, and while you stand  · You may notice improved bladder control within about 6 weeks  · Tighten your pelvic muscles before you sneeze, cough, or lift to prevent urine leakage  Follow up with your healthcare provider as directed:  Write down your questions so you remember to ask them during your visits  © Copyright [employer] 2021 Information is for End User's use only and may not be sold, redistributed or otherwise used for commercial purposes  All illustrations and images included in CareNotes® are the copyrighted property of A D A M , Inc  or [employer]   The above information is an  only  It is not intended as medical advice for individual conditions or treatments  Talk to your doctor, nurse or pharmacist before following any medical regimen to see if it is safe and effective for you  For vaginal dryness: You may use:     Coconut oil (organic, pure, unscented) as needed for moisture or lubrication   ( Do not use if allergic)       Replens moisture restore external comfort gel daily ( use as directed on the box) Replens long lasting vaginal moisturizer  ( use as directed on the box)       For Vaginal Lubrication:        You may use:     Coconut oil (organic, pure, unscented) as needed for lubrication during intercourse  (Do not use if allergic)               Replens silky smooth lubricant, premium silicone based lubricant for intercourse  ( use as directed, a small amount will provide an enhanced natural feeling)     Any premium over the counter vaginal lubricant water or silicone based  Silicone based will have more staying power  For Vulvar hygiene:     No soaps or feminine wash to the vulva with the exception of Dove or Dove Sensitive Skin bar soap if necessary  Only perfume-free, dye-free laundry detergent, use a second rinse cycle  Avoid fabric softeners/dryer sheets  No lotion to the area  No Douching   Coconut oil as a lubricant (if not using condoms) or another scent-free premium lubricant  Loose fitting cotton underwear and loose fitting outer clothing   Partner to avoid the same products as well  Over the counter probiotic taken orally may help to restore vaginal willy  Menopause   WHAT YOU NEED TO KNOW:   What is menopause? Menopause is a normal stage in a woman's life when her monthly periods stop  A woman who has not had a period for a full year after the age of 36 is considered to be in menopause  Menopause usually occurs between ages 52 to 48  Perimenopause is a stage before menopause that may cause signs and symptoms similar to menopause  Perimenopause may start about 4 years before menopause  What causes menopause? Menopause starts when the ovaries stop making the female hormones estrogen and progesterone  After menopause, a woman is no longer able to become pregnant   Any of the following may trigger menopause or early menopause:  · Older age    · Surgery, including a hysterectomy or oophorectomy    · Family history of early menopause    · Smoking    · Chemotherapy or pelvic radiation    · Chromosome abnormalities, including Aguero syndrome and Fragile X syndrome    What are the signs and symptoms of menopause? The signs and symptoms of menopause can be different from woman to woman:  · Irregular menstrual cycles with heavy vaginal bleeding followed by decreased bleeding until it stops    · Hot flashes (feeling warm, flushed, and sweaty)     · Vaginal changes such as increased dryness     · Mood changes such as anxiety, depression, or decreased desire to have sex    · Trouble sleeping, joint pain, headaches    · Brittle nails, hair on chin or chest where it is normally absent    · Decrease in breast size and change in skin texture    What do I need to know about menopause? · You can still get pregnant while you have periods  Continue to use birth control if you do not want to get pregnant  You may need to use birth control until it has been 1 year since your periods stopped  · Hormone replacement therapy (HRT) can be used to treat symptoms of menopause  HRT is medicine that replaces your low hormone levels  HRT contains estrogen and sometimes progestin  HRT has benefits and risks  HRT decreases your risk for bone fractures by helping to prevent osteoporosis  HRT also protects you from colon cancer  HRT may increase your risk for breast cancer, blood clots, heart disease, and stroke  Ask your healthcare provider if HRT is right for you  How can I care for myself? · Manage hot flashes  Hot flashes are brief periods of feeling very warm, flushed, and sweaty  Hot flashes can last from a few seconds to several minutes  They may happen many times during the day, and are common at night  Layer your clothing so that you can easily remove some clothing and cool yourself during a hot flash  Cold drinks may also be helpful  · Reduce vaginal dryness  by using over-the-counter vaginal creams  Vaginal dryness may cause you to have pain or discomfort during sex   Only use creams that are made for vaginal use  Do  not  use petroleum jelly  You may need an estrogen cream to put in and around your vagina  Estrogen cream may help decrease vaginal dryness and lower your risk of vaginal infections  · Continue to use birth control  during perimenopause if you do not want to get pregnant  You may need to use birth control until it has been 1 year since your periods stopped  Ask your healthcare provider when you can stop using birth control to prevent pregnancy  How can I live a healthy lifestyle during and after menopause? After menopause, your risk for heart disease and bone loss increases  Ask about these and other ways to stay healthy:  · Exercise regularly  Exercise helps you maintain a healthy weight  Exercise can also help to control your blood pressure and cholesterol levels  Include weight-bearing exercise for strong bones  Weight bearing exercise is recommended for at least 30 minutes, 3 times a week  Ask your healthcare provider about the best exercise plan for you  · Eat a variety of healthy foods  Include fruits, vegetables, whole grains (whole-wheat bread, pasta, and cereals), low-fat dairy, and lean protein foods (beans, poultry, and fish)  Limit foods high in sodium (salt)  Ask your healthcare provider for more information about a meal plan that is right for you  · Do not smoke  If you smoke, it is never too late to quit  You are more likely to have a heart attack, lung disease, blood clots, and cancer if you smoke  Ask your healthcare provider for information if you need help quitting  · Take supplements as directed  You may need extra calcium and vitamin D to help prevent osteoporosis  · Limit alcohol and caffeine  Alcohol and caffeine may worsen your symptoms  When should I contact my healthcare provider? · You have vaginal bleeding after menopause  · You have questions or concerns about your condition or care      CARE AGREEMENT:   You have the right to help plan your care  Learn about your health condition and how it may be treated  Discuss treatment options with your healthcare providers to decide what care you want to receive  You always have the right to refuse treatment  The above information is an  only  It is not intended as medical advice for individual conditions or treatments  Talk to your doctor, nurse or pharmacist before following any medical regimen to see if it is safe and effective for you  © Copyright [employer] [facility] 2021 Information is for End User's use only and may not be sold, redistributed or otherwise used for commercial purposes   All illustrations and images included in CareNotes® are the copyrighted property of [employer]  or [address] [place] [address] [facility]

## 2021-08-30 NOTE — PROGRESS NOTES
Diagnoses and all orders for this visit:    Family history of breast cancer  -     Ambulatory Referral to Breast Surgery; Future    Encounter for gynecological examination without abnormal finding  -     Liquid-based pap, screening    BRCA gene mutation positive in female  -     Ambulatory Referral to Breast Surgery; Future  -     US pelvis complete w transvaginal; Future          Health Maintenance:    Last PAP:  pt reports neg   Next PAP Due:collected today     Last Mammogram:10/23/2020 Dense breast tissue with call back for US, Neg  Next Mammogram:21    Last Colonoscopy:2018/ Neg    Last DEXA: Not on file    Pleasant 62 y o  ,   postmenopausal x 2 years female here for annual exam  GYN hx includes: family hx of breast CA 2 maternal aunts and cervical CA in her mother, pt is BRCA positive ( unsure of 1 or 2),pt reports that she was told she should have more frequent evaluations but her insurance denied this  Hx of heavy menstrual bleeding and fibroids, had D&C in 2019 and has no further problems  Denies history of abnormal pap smears, collected   pap today  She reports no new changes in her health  Denies any breast concerns today,   Denies postmenopausal bleeding or issues with vasomotor symptoms  Denies vaginal issues  Denies pelvic pain    Denies symptoms of pelvic organ prolapse, urinary, or fecal incontinence  Is not sexually active  Monogamous relationship  Denies STI concerns  declines STD testing  Denies intimate partner violence           Past Medical History:   Diagnosis Date    Diabetes mellitus ([address] )     Disease of thyroid gland     Hypertension      Past Surgical History:   Procedure Laterality Date    CARDIAC SURGERY        Family History   Problem Relation Age of Onset    Cervical cancer Mother         hysterectomy    Breast cancer Maternal Aunt         masectomy    Breast cancer Maternal Aunt         found lump in nipple     Social History     Tobacco Use    Smoking status: Never Smoker    Smokeless tobacco: Never Used   Vaping Use    Vaping Use: Never used   Substance Use Topics    Alcohol use: Never    Drug use: Never       Current Outpatient Medications:     Aspirin Low Dose 81 MG EC tablet, Take 1 tablet (81 mg total) by mouth daily, Disp: 90 tablet, Rfl: 1    carvedilol (COREG) 12 5 mg tablet, Take 1 tablet (12 5 mg total) by mouth 2 (two) times a day, Disp: 180 tablet, Rfl: 1    ezetimibe-simvastatin (VYTORIN) 10-40 mg per tablet, Take 1 tablet by mouth daily at bedtime, Disp: 90 tablet, Rfl: 1    levothyroxine 175 mcg tablet, Take 1 tablet (175 mcg total) by mouth daily Patient takes 7 1/2 tablets by mouth per week    One daily and on Sunday 1 5 tablet, Disp: 90 tablet, Rfl: 1    lisinopril (ZESTRIL) 20 mg tablet, Take 1 tablet (20 mg total) by mouth daily, Disp: 90 tablet, Rfl: 1    metFORMIN (GLUCOPHAGE) 1000 MG tablet, Take 1 tablet (1,000 mg total) by mouth 2 (two) times a day with meals, Disp: 180 tablet, Rfl: 1    ofloxacin (FLOXIN) 0 3 % otic solution, Administer 10 drops into both ears 2 (two) times a day, Disp: 5 mL, Rfl: 0  Patient Active Problem List    Diagnosis Date Noted    Coronary artery disease involving native coronary artery of native heart without angina pectoris 07/01/2021    Presence of stent in LAD coronary artery 07/01/2021    Hypothyroidism 07/01/2021    Essential hypertension 07/01/2021    Mixed hyperlipidemia 07/01/2021    Type 2 diabetes mellitus without complication, without long-term current use of insulin ([facility] ) 07/01/2021    NOLASCO (dyspnea on exertion) 07/01/2021    Class 3 severe obesity due to excess calories with serious comorbidity and body mass index (BMI) of 45 0 to 49 9 in adult [facility]) 07/01/2021    Varicose veins of both lower extremities with pain 07/01/2021    BRCA positive 07/01/2021    History of anemia 07/01/2021       Allergies   Allergen Reactions    Kiwi Extract - Food Allergy Anaphylaxis    Latex Hives       OB History    Para Term  AB Living   3 3       3   SAB TAB Ectopic Multiple Live Births                  # Outcome Date GA Lbr Raghav/2nd Weight Sex Delivery Anes PTL Lv   3 Para            2 Para            1 Para                Vitals:    21 0857   BP: 132/88   BP Location: Left arm   Patient Position: Sitting   Cuff Size: Standard   Weight: 133 kg (293 lb 12 8 oz)   Height: 5' 6" (1 676 m)     Body mass index is 47 42 kg/m²  Review of Systems     Constitutional: Negative for chills, fatigue, fever, headaches, visual disturbances, and unexpected weight change  Respiratory: Negative for cough, & shortness of breath  Cardiovascular: Negative for chest pain       Gastrointestinal: Negative for Abd pain, nausea & vomiting, constipation and diarrhea  Genitourinary: Negative for difficulty urinating, dysuria, hematuria,  unusual vaginal bleeding or discharge  Skin: Negative skin changes        Physical Exam     Constitutional: Alert & Oriented x3, well-developed and well-nourished  No distress  HENT: Atraumatic, Normocephalic, Conjunctivae clear  Neck: Normal range of motion  Neck supple  No thyromegaly, mass, nodules or tenderness  Pulmonary: Effort normal  Lungs clear to ascultation bilateral  Cardiac: RRR, no murmur   Abdominal: Soft  No tenderness or masses  Musculoskeletal: Normal ROM  Skin: Warm & Dry  Psychological: Normal mood, thought content, behavior & judgement     Breasts: Dense tissue   Right: tissue soft without masses, tenderness, skin changes or nipple discharge  No areas of erythema or pain  No subclavicular, axillary, pectoral adenopathy  Left:  tissue soft without masses, tenderness, skin changes or nipple discharge  No areas of erythema or pain  No subclavicular, axillary, pectoral adenopathy    Pelvic exam was performed with patient supine, lithotomy position  Exam is consistent with  atrophic changes    Vulva/ Vestibule: Right: Negative rash, tenderness, lesion or injury                               Left: Negative rash, tenderness, lesion or injury   Urethral meatus: Negative for  tenderness, inflammation or discharge  Uterus: not deviated, enlarged, fixed or tender  Cervix: No CMT, no discharge or friability  Right adnexa: no mass, no tenderness and no fullness  Left adnexa: no mass, no tenderness and no fullness  Vagina: Consistent with  atrophic changes  No erythema, tenderness, masses, or foreign body in the vagina  No signs of injury around the vagina  No unusual vaginal discharge   Perineum without lesions, signs of injury, erythema or swelling  Inguinal Canal:        Right: No inguinal adenopathy or hernia present  Left: No inguinal adenopathy or hernia present  Advised to call with any Post Menopausal bleeding  Calcium/ Vit D dietary requirements discussed,   Weight bearing exercises minium of 150 mins/weekly advised  SBE and yearly mammography advised  Referral to breast specialist for further evaluation d/t + BRCA gene and family hx of breast CA  ASCCP guidelines reviewed  Will discontinue PAP's according to recommendations  Condoms encouraged with all sexual activity to prevent STI's  Health maintenance encouraged with PMD, remain current with Colonoscopy, Dexa scan, and recommended vaccines  Advised to call with any issues, all concerns & questions addressed     F/u annually     I called and left a message for the patient and made her aware of Us order and gave her the number to call and schedule test

## 2021-09-01 ENCOUNTER — OFFICE VISIT (OUTPATIENT)
Dept: OBGYN CLINIC | Facility: CLINIC | Age: 61
End: 2021-09-01
Payer: COMMERCIAL

## 2021-09-01 VITALS
SYSTOLIC BLOOD PRESSURE: 132 MMHG | WEIGHT: 293 LBS | DIASTOLIC BLOOD PRESSURE: 88 MMHG | HEIGHT: 66 IN | BODY MASS INDEX: 47.09 KG/M2

## 2021-09-01 DIAGNOSIS — Z15.01 BRCA GENE MUTATION POSITIVE IN FEMALE: ICD-10-CM

## 2021-09-01 DIAGNOSIS — Z15.02 BRCA GENE MUTATION POSITIVE IN FEMALE: ICD-10-CM

## 2021-09-01 DIAGNOSIS — Z01.419 ENCOUNTER FOR GYNECOLOGICAL EXAMINATION WITHOUT ABNORMAL FINDING: ICD-10-CM

## 2021-09-01 DIAGNOSIS — Z15.09 BRCA GENE MUTATION POSITIVE IN FEMALE: ICD-10-CM

## 2021-09-01 DIAGNOSIS — Z80.3 FAMILY HISTORY OF BREAST CANCER: Primary | ICD-10-CM

## 2021-09-01 PROCEDURE — G0145 SCR C/V CYTO,THINLAYER,RESCR: HCPCS | Performed by: OBSTETRICS & GYNECOLOGY

## 2021-09-01 PROCEDURE — 1036F TOBACCO NON-USER: CPT | Performed by: OBSTETRICS & GYNECOLOGY

## 2021-09-01 PROCEDURE — 3075F SYST BP GE 130 - 139MM HG: CPT | Performed by: OBSTETRICS & GYNECOLOGY

## 2021-09-01 PROCEDURE — 3008F BODY MASS INDEX DOCD: CPT | Performed by: OBSTETRICS & GYNECOLOGY

## 2021-09-01 PROCEDURE — 99386 PREV VISIT NEW AGE 40-64: CPT | Performed by: OBSTETRICS & GYNECOLOGY

## 2021-09-01 PROCEDURE — 3079F DIAST BP 80-89 MM HG: CPT | Performed by: OBSTETRICS & GYNECOLOGY

## 2021-09-01 PROCEDURE — 87624 HPV HI-RISK TYP POOLED RSLT: CPT | Performed by: OBSTETRICS & GYNECOLOGY

## 2021-09-07 LAB
LAB AP GYN PRIMARY INTERPRETATION: NORMAL
Lab: NORMAL

## 2021-09-08 DIAGNOSIS — Z12.4 SCREENING FOR CERVICAL CANCER: Primary | ICD-10-CM

## 2021-09-08 LAB
HPV HR 12 DNA CVX QL NAA+PROBE: NEGATIVE
HPV16 DNA CVX QL NAA+PROBE: NEGATIVE
HPV18 DNA CVX QL NAA+PROBE: NEGATIVE

## 2021-10-04 ENCOUNTER — APPOINTMENT (OUTPATIENT)
Dept: RADIOLOGY | Facility: CLINIC | Age: 61
End: 2021-10-04
Payer: COMMERCIAL

## 2021-10-04 ENCOUNTER — OFFICE VISIT (OUTPATIENT)
Dept: FAMILY MEDICINE CLINIC | Facility: CLINIC | Age: 61
End: 2021-10-04
Payer: COMMERCIAL

## 2021-10-04 VITALS
WEIGHT: 293 LBS | HEART RATE: 86 BPM | SYSTOLIC BLOOD PRESSURE: 136 MMHG | OXYGEN SATURATION: 98 % | BODY MASS INDEX: 47.09 KG/M2 | HEIGHT: 66 IN | TEMPERATURE: 98.3 F | DIASTOLIC BLOOD PRESSURE: 82 MMHG

## 2021-10-04 DIAGNOSIS — E11.69 HYPERLIPIDEMIA ASSOCIATED WITH TYPE 2 DIABETES MELLITUS (HCC): ICD-10-CM

## 2021-10-04 DIAGNOSIS — E66.9 TYPE 2 DIABETES MELLITUS WITH OBESITY (HCC): ICD-10-CM

## 2021-10-04 DIAGNOSIS — Z23 NEED FOR INFLUENZA VACCINATION: ICD-10-CM

## 2021-10-04 DIAGNOSIS — E11.69 TYPE 2 DIABETES MELLITUS WITH OBESITY (HCC): ICD-10-CM

## 2021-10-04 DIAGNOSIS — E11.9 TYPE 2 DIABETES MELLITUS WITHOUT COMPLICATION, WITHOUT LONG-TERM CURRENT USE OF INSULIN (HCC): Primary | ICD-10-CM

## 2021-10-04 DIAGNOSIS — M17.0 PRIMARY OSTEOARTHRITIS OF BOTH KNEES: ICD-10-CM

## 2021-10-04 DIAGNOSIS — E66.01 CLASS 3 SEVERE OBESITY DUE TO EXCESS CALORIES WITH SERIOUS COMORBIDITY AND BODY MASS INDEX (BMI) OF 45.0 TO 49.9 IN ADULT (HCC): ICD-10-CM

## 2021-10-04 DIAGNOSIS — E11.59 HYPERTENSION COMPLICATING DIABETES (HCC): ICD-10-CM

## 2021-10-04 DIAGNOSIS — E03.9 HYPOTHYROIDISM, UNSPECIFIED TYPE: ICD-10-CM

## 2021-10-04 DIAGNOSIS — E78.5 HYPERLIPIDEMIA ASSOCIATED WITH TYPE 2 DIABETES MELLITUS (HCC): ICD-10-CM

## 2021-10-04 DIAGNOSIS — I15.2 HYPERTENSION COMPLICATING DIABETES (HCC): ICD-10-CM

## 2021-10-04 LAB — SL AMB POCT HEMOGLOBIN AIC: 6.5 (ref ?–6.5)

## 2021-10-04 PROCEDURE — 3044F HG A1C LEVEL LT 7.0%: CPT | Performed by: ORTHOPAEDIC SURGERY

## 2021-10-04 PROCEDURE — 99214 OFFICE O/P EST MOD 30 MIN: CPT | Performed by: PHYSICIAN ASSISTANT

## 2021-10-04 PROCEDURE — 83036 HEMOGLOBIN GLYCOSYLATED A1C: CPT | Performed by: PHYSICIAN ASSISTANT

## 2021-10-04 PROCEDURE — 90471 IMMUNIZATION ADMIN: CPT

## 2021-10-04 PROCEDURE — 73562 X-RAY EXAM OF KNEE 3: CPT

## 2021-10-04 PROCEDURE — 90682 RIV4 VACC RECOMBINANT DNA IM: CPT

## 2021-10-14 ENCOUNTER — OFFICE VISIT (OUTPATIENT)
Dept: CARDIOLOGY CLINIC | Facility: CLINIC | Age: 61
End: 2021-10-14
Payer: COMMERCIAL

## 2021-10-14 VITALS
HEIGHT: 66 IN | DIASTOLIC BLOOD PRESSURE: 68 MMHG | RESPIRATION RATE: 17 BRPM | SYSTOLIC BLOOD PRESSURE: 126 MMHG | BODY MASS INDEX: 47.09 KG/M2 | HEART RATE: 67 BPM | WEIGHT: 293 LBS | OXYGEN SATURATION: 98 %

## 2021-10-14 DIAGNOSIS — I25.10 CORONARY ARTERY DISEASE INVOLVING NATIVE HEART WITHOUT ANGINA PECTORIS, UNSPECIFIED VESSEL OR LESION TYPE: Primary | ICD-10-CM

## 2021-10-14 PROCEDURE — 99213 OFFICE O/P EST LOW 20 MIN: CPT | Performed by: INTERNAL MEDICINE

## 2021-10-15 LAB
ALBUMIN SERPL-MCNC: 4 G/DL (ref 3.6–5.1)
ALBUMIN/GLOB SERPL: 1.8 (CALC) (ref 1–2.5)
ALP SERPL-CCNC: 77 U/L (ref 37–153)
ALT SERPL-CCNC: 34 U/L (ref 6–29)
AST SERPL-CCNC: 21 U/L (ref 10–35)
BILIRUB SERPL-MCNC: 0.3 MG/DL (ref 0.2–1.2)
BUN SERPL-MCNC: 14 MG/DL (ref 7–25)
BUN/CREAT SERPL: ABNORMAL (CALC) (ref 6–22)
CALCIUM SERPL-MCNC: 9.4 MG/DL (ref 8.6–10.4)
CHLORIDE SERPL-SCNC: 105 MMOL/L (ref 98–110)
CHOLEST SERPL-MCNC: 158 MG/DL
CHOLEST/HDLC SERPL: 3 (CALC)
CO2 SERPL-SCNC: 30 MMOL/L (ref 20–32)
CREAT SERPL-MCNC: 0.68 MG/DL (ref 0.5–0.99)
GLOBULIN SER CALC-MCNC: 2.2 G/DL (CALC) (ref 1.9–3.7)
GLUCOSE SERPL-MCNC: 115 MG/DL (ref 65–99)
HDLC SERPL-MCNC: 52 MG/DL
LDLC SERPL CALC-MCNC: 82 MG/DL (CALC)
NONHDLC SERPL-MCNC: 106 MG/DL (CALC)
POTASSIUM SERPL-SCNC: 5.2 MMOL/L (ref 3.5–5.3)
PROT SERPL-MCNC: 6.2 G/DL (ref 6.1–8.1)
SL AMB EGFR AFRICAN AMERICAN: 110 ML/MIN/1.73M2
SL AMB EGFR NON AFRICAN AMERICAN: 95 ML/MIN/1.73M2
SODIUM SERPL-SCNC: 140 MMOL/L (ref 135–146)
TRIGL SERPL-MCNC: 144 MG/DL
TSH SERPL-ACNC: 1.06 MIU/L (ref 0.4–4.5)

## 2021-10-19 ENCOUNTER — APPOINTMENT (OUTPATIENT)
Dept: RADIOLOGY | Facility: CLINIC | Age: 61
End: 2021-10-19
Payer: COMMERCIAL

## 2021-10-19 ENCOUNTER — CONSULT (OUTPATIENT)
Dept: OBGYN CLINIC | Facility: CLINIC | Age: 61
End: 2021-10-19
Payer: COMMERCIAL

## 2021-10-19 VITALS
HEART RATE: 80 BPM | BODY MASS INDEX: 46.61 KG/M2 | WEIGHT: 290 LBS | DIASTOLIC BLOOD PRESSURE: 82 MMHG | HEIGHT: 66 IN | SYSTOLIC BLOOD PRESSURE: 128 MMHG | RESPIRATION RATE: 18 BRPM

## 2021-10-19 DIAGNOSIS — M17.0 PRIMARY OSTEOARTHRITIS OF BOTH KNEES: ICD-10-CM

## 2021-10-19 PROCEDURE — 3008F BODY MASS INDEX DOCD: CPT | Performed by: ORTHOPAEDIC SURGERY

## 2021-10-19 PROCEDURE — 73560 X-RAY EXAM OF KNEE 1 OR 2: CPT

## 2021-10-19 PROCEDURE — 3079F DIAST BP 80-89 MM HG: CPT | Performed by: ORTHOPAEDIC SURGERY

## 2021-10-19 PROCEDURE — 3074F SYST BP LT 130 MM HG: CPT | Performed by: ORTHOPAEDIC SURGERY

## 2021-10-19 PROCEDURE — 20610 DRAIN/INJ JOINT/BURSA W/O US: CPT | Performed by: ORTHOPAEDIC SURGERY

## 2021-10-19 PROCEDURE — 1036F TOBACCO NON-USER: CPT | Performed by: ORTHOPAEDIC SURGERY

## 2021-10-19 PROCEDURE — 99203 OFFICE O/P NEW LOW 30 MIN: CPT | Performed by: ORTHOPAEDIC SURGERY

## 2021-10-19 RX ORDER — BUPIVACAINE HYDROCHLORIDE 2.5 MG/ML
2 INJECTION, SOLUTION INFILTRATION; PERINEURAL
Status: COMPLETED | OUTPATIENT
Start: 2021-10-19 | End: 2021-10-19

## 2021-10-19 RX ORDER — METHYLPREDNISOLONE ACETATE 40 MG/ML
2 INJECTION, SUSPENSION INTRA-ARTICULAR; INTRALESIONAL; INTRAMUSCULAR; SOFT TISSUE
Status: COMPLETED | OUTPATIENT
Start: 2021-10-19 | End: 2021-10-19

## 2021-10-19 RX ORDER — LIDOCAINE HYDROCHLORIDE 10 MG/ML
2 INJECTION, SOLUTION INFILTRATION; PERINEURAL
Status: COMPLETED | OUTPATIENT
Start: 2021-10-19 | End: 2021-10-19

## 2021-10-19 RX ADMIN — METHYLPREDNISOLONE ACETATE 2 ML: 40 INJECTION, SUSPENSION INTRA-ARTICULAR; INTRALESIONAL; INTRAMUSCULAR; SOFT TISSUE at 11:50

## 2021-10-19 RX ADMIN — LIDOCAINE HYDROCHLORIDE 2 ML: 10 INJECTION, SOLUTION INFILTRATION; PERINEURAL at 11:50

## 2021-10-19 RX ADMIN — BUPIVACAINE HYDROCHLORIDE 2 ML: 2.5 INJECTION, SOLUTION INFILTRATION; PERINEURAL at 11:50

## 2021-11-02 ENCOUNTER — HOSPITAL ENCOUNTER (OUTPATIENT)
Dept: MAMMOGRAPHY | Facility: CLINIC | Age: 61
Discharge: HOME | End: 2021-11-02
Payer: COMMERCIAL

## 2021-11-02 VITALS — BODY MASS INDEX: 46.61 KG/M2 | WEIGHT: 290 LBS | HEIGHT: 66 IN

## 2021-11-02 DIAGNOSIS — Z12.31 ENCOUNTER FOR SCREENING MAMMOGRAM FOR MALIGNANT NEOPLASM OF BREAST: ICD-10-CM

## 2021-11-02 PROCEDURE — 77063 BREAST TOMOSYNTHESIS BI: CPT

## 2021-11-02 PROCEDURE — 77067 SCR MAMMO BI INCL CAD: CPT

## 2021-12-27 ENCOUNTER — TELEPHONE (OUTPATIENT)
Dept: OBGYN CLINIC | Facility: CLINIC | Age: 61
End: 2021-12-27

## 2022-01-04 ENCOUNTER — TELEMEDICINE (OUTPATIENT)
Dept: FAMILY MEDICINE CLINIC | Facility: CLINIC | Age: 62
End: 2022-01-04
Payer: COMMERCIAL

## 2022-01-04 DIAGNOSIS — U07.1 COVID-19: Primary | ICD-10-CM

## 2022-01-04 PROCEDURE — 99213 OFFICE O/P EST LOW 20 MIN: CPT | Performed by: PHYSICIAN ASSISTANT

## 2022-01-04 RX ORDER — NABUMETONE 750 MG/1
TABLET, FILM COATED ORAL
COMMUNITY
Start: 2021-12-29

## 2022-01-04 RX ORDER — CEFUROXIME AXETIL 500 MG/1
TABLET ORAL
COMMUNITY
Start: 2021-12-29 | End: 2022-06-20 | Stop reason: ALTCHOICE

## 2022-01-04 RX ORDER — PROMETHAZINE HYDROCHLORIDE 6.25 MG/5ML
SYRUP ORAL
COMMUNITY
Start: 2021-12-29

## 2022-01-04 RX ORDER — PREDNISONE 20 MG/1
TABLET ORAL
COMMUNITY
Start: 2021-12-29 | End: 2022-06-20 | Stop reason: ALTCHOICE

## 2022-01-04 NOTE — PROGRESS NOTES
COVID-19 Outpatient Progress Note    Assessment/Plan:    Problem List Items Addressed This Visit     None      Visit Diagnoses     COVID-19    -  Primary         Disposition:     Patient has COVID-19 infection  Based off CDC guidelines, they were recommended to isolate for 5 days from the date of the positive test  If they remain asymptomatic, isolation may be ended followed by 5 days of wearing a mask when around othes to minimize risk of infecting others  If they have a fever, continue to stay home until fever resolves for at least 24 hours  Sx day 9, home rapid was positive  No SOB and no fevers  No new/worsening sx recently  Can lift isolation  Follow up in office for diabetic check as scheduled  I have spent 10 minutes directly with the patient  Encounter provider Nayla Wise PA-C    Provider located at [place] [address]  [address] [place] [phone]    Recent Visits  No visits were found meeting these conditions  Showing recent visits within past 7 days and meeting all other requirements  Today's Visits  Date Type Provider Dept   01/04/22 Telemedicine Tereza Shukla PA-C [facility]   Showing today's visits and meeting all other requirements  Future Appointments  No visits were found meeting these conditions  Showing future appointments within next 150 days and meeting all other requirements     This virtual check-in was done via telephone and she agrees to proceed  Patient agrees to participate in a virtual check in via telephone or video visit instead of presenting to the office to address urgent/immediate medical needs  Patient is aware this is a billable service  After connecting through Telephone, the patient was identified by name and date of birth  Larry Nicholson was informed that this was a telemedicine visit and that the exam was being conducted confidentially over secure lines  My office door was closed   Tong COVARRUBIAS Zena Mustafa acknowledged consent and understanding of privacy and security of the telemedicine visit  I informed the patient that I have reviewed her record in Epic and presented the opportunity for her to ask any questions regarding the visit today  The patient agreed to participate  It was my intent to perform this visit via video technology but the patient was not able to do a video connection so the visit was completed via audio telephone only  Verification of patient location:  Patient is located in the following state in which I hold an active license: [place]    Subjective:   Shon Ayala is a 62 y o  female who has been screened for COVID-19  Symptom change since last report: resolving  Patient is currently asymptomatic  Patient's symptoms include cough  Patient denies fever, chills, fatigue, malaise, congestion, rhinorrhea, sore throat, anosmia, loss of taste, shortness of breath, chest tightness, abdominal pain, nausea, vomiting, diarrhea, myalgias and headaches  Date of symptom onset: 12/26/2021  Date of positive COVID-19 PCR: 12/28/2021  COVID-19 vaccination status: Fully vaccinated (primary series)    Tong has been staying home and has isolated themselves in her home  She is taking care to not share personal items and is cleaning all surfaces that are touched often, like counters, tabletops, and doorknobs using household cleaning sprays or wipes  She is wearing a mask when she leaves her room  Sx day 9 covid day 7, pt noted to have fevers and cough beginning on 12/26  Notes improvement in sx with no recent fevers  No SOB  Not able to measure O2  Notes scant blood when coughing a lot  No CP  Otherwise feels she is improved       Lab Results   Component Value Date    SARSCOV2 Negative 04/22/2021     Past Medical History:   Diagnosis Date    Diabetes mellitus ([facility] )     Disease of thyroid gland     Hypertension      Past Surgical History:   Procedure Laterality Date    CARDIAC SURGERY Current Outpatient Medications   Medication Sig Dispense Refill    Aspirin Low Dose 81 MG EC tablet Take 1 tablet (81 mg total) by mouth daily 90 tablet 1    carvedilol (COREG) 12 5 mg tablet Take 1 tablet (12 5 mg total) by mouth 2 (two) times a day 180 tablet 1    cefuroxime (CEFTIN) 500 mg tablet       Diclofenac Sodium (VOLTAREN) 1 % Apply 2 g topically 4 (four) times a day 100 g 2    ezetimibe-simvastatin (VYTORIN) 10-40 mg per tablet Take 1 tablet by mouth daily at bedtime 90 tablet 1    levothyroxine 175 mcg tablet Take 1 tablet (175 mcg total) by mouth daily Patient takes 7 1/2 tablets by mouth per week  One daily and on Sunday 1 5 tablet 90 tablet 1    lisinopril (ZESTRIL) 20 mg tablet Take 1 tablet (20 mg total) by mouth daily 90 tablet 1    metFORMIN (GLUCOPHAGE) 1000 MG tablet Take 1 tablet (1,000 mg total) by mouth 2 (two) times a day with meals 180 tablet 1    nabumetone (RELAFEN) 750 mg tablet       ofloxacin (FLOXIN) 0 3 % otic solution Administer 10 drops into both ears 2 (two) times a day (Patient not taking: Reported on 10/19/2021) 5 mL 0    predniSONE 20 mg tablet       promethazine (PHENERGAN) 12 5 mg/10 mL syrup        No current facility-administered medications for this visit  Allergies   Allergen Reactions    Kiwi Extract - Food Allergy Anaphylaxis    Latex Hives       Review of Systems   Constitutional: Negative for chills, fatigue and fever  HENT: Negative for congestion, rhinorrhea and sore throat  Respiratory: Positive for cough  Negative for chest tightness and shortness of breath  Gastrointestinal: Negative for abdominal pain, diarrhea, nausea and vomiting  Musculoskeletal: Negative for myalgias  Neurological: Negative for headaches  Objective: There were no vitals filed for this visit  Physical Exam  Vitals and nursing note reviewed  Constitutional:       General: She is not in acute distress    Pulmonary:      Effort: Pulmonary effort is normal  No respiratory distress  Neurological:      Mental Status: She is alert and oriented to person, place, and time  VIRTUAL VISIT DISCLAIMER    Tong Bolaños verbally agrees to participate in [employer]  Pt is aware that [employer] could be limited without vital signs or the ability to perform a full hands-on physical exam  Tong Carlton understands she or the provider may request at any time to terminate the video visit and request the patient to seek care or treatment in person

## 2022-01-14 ENCOUNTER — OFFICE VISIT (OUTPATIENT)
Dept: FAMILY MEDICINE CLINIC | Facility: CLINIC | Age: 62
End: 2022-01-14
Payer: COMMERCIAL

## 2022-01-14 VITALS
TEMPERATURE: 98 F | BODY MASS INDEX: 45.8 KG/M2 | SYSTOLIC BLOOD PRESSURE: 128 MMHG | HEIGHT: 66 IN | DIASTOLIC BLOOD PRESSURE: 72 MMHG | OXYGEN SATURATION: 98 % | WEIGHT: 285 LBS | HEART RATE: 92 BPM

## 2022-01-14 DIAGNOSIS — Z80.0 FAMILY HISTORY OF COLON CANCER: ICD-10-CM

## 2022-01-14 DIAGNOSIS — E11.9 TYPE 2 DIABETES MELLITUS WITHOUT COMPLICATION, WITHOUT LONG-TERM CURRENT USE OF INSULIN (HCC): Primary | ICD-10-CM

## 2022-01-14 DIAGNOSIS — E11.59 HYPERTENSION COMPLICATING DIABETES (HCC): ICD-10-CM

## 2022-01-14 DIAGNOSIS — E11.69 TYPE 2 DIABETES MELLITUS WITH OBESITY (HCC): ICD-10-CM

## 2022-01-14 DIAGNOSIS — E78.5 HYPERLIPIDEMIA ASSOCIATED WITH TYPE 2 DIABETES MELLITUS (HCC): ICD-10-CM

## 2022-01-14 DIAGNOSIS — I25.10 CORONARY ARTERY DISEASE INVOLVING NATIVE CORONARY ARTERY OF NATIVE HEART WITHOUT ANGINA PECTORIS: ICD-10-CM

## 2022-01-14 DIAGNOSIS — I15.2 HYPERTENSION COMPLICATING DIABETES (HCC): ICD-10-CM

## 2022-01-14 DIAGNOSIS — E66.9 TYPE 2 DIABETES MELLITUS WITH OBESITY (HCC): ICD-10-CM

## 2022-01-14 DIAGNOSIS — E11.69 HYPERLIPIDEMIA ASSOCIATED WITH TYPE 2 DIABETES MELLITUS (HCC): ICD-10-CM

## 2022-01-14 DIAGNOSIS — Z95.5 PRESENCE OF STENT IN LAD CORONARY ARTERY: ICD-10-CM

## 2022-01-14 DIAGNOSIS — Z12.11 SCREEN FOR COLON CANCER: ICD-10-CM

## 2022-01-14 DIAGNOSIS — E66.01 CLASS 3 SEVERE OBESITY DUE TO EXCESS CALORIES WITH SERIOUS COMORBIDITY AND BODY MASS INDEX (BMI) OF 45.0 TO 49.9 IN ADULT (HCC): ICD-10-CM

## 2022-01-14 LAB — SL AMB POCT HEMOGLOBIN AIC: 6.3 (ref ?–6.5)

## 2022-01-14 PROCEDURE — 3044F HG A1C LEVEL LT 7.0%: CPT | Performed by: PHYSICIAN ASSISTANT

## 2022-01-14 PROCEDURE — 99214 OFFICE O/P EST MOD 30 MIN: CPT | Performed by: PHYSICIAN ASSISTANT

## 2022-01-14 PROCEDURE — 1036F TOBACCO NON-USER: CPT | Performed by: PHYSICIAN ASSISTANT

## 2022-01-14 PROCEDURE — 83036 HEMOGLOBIN GLYCOSYLATED A1C: CPT | Performed by: PHYSICIAN ASSISTANT

## 2022-01-14 PROCEDURE — 3008F BODY MASS INDEX DOCD: CPT | Performed by: PHYSICIAN ASSISTANT

## 2022-01-14 PROCEDURE — 3078F DIAST BP <80 MM HG: CPT | Performed by: PHYSICIAN ASSISTANT

## 2022-01-14 PROCEDURE — 3074F SYST BP LT 130 MM HG: CPT | Performed by: PHYSICIAN ASSISTANT

## 2022-01-14 NOTE — PROGRESS NOTES
Assessment/Plan:       Problem List Items Addressed This Visit        Endocrine    Hypertension complicating diabetes ([facility] [address] )    Type 2 diabetes mellitus without complication, without long-term current use of insulin ([facility] ) - Primary    Relevant Orders    POCT hemoglobin A1c (Completed)    Comprehensive metabolic panel    Hyperlipidemia associated with type 2 diabetes mellitus ([facility] )    Relevant Orders    Lipid Panel with Direct LDL reflex    Type 2 diabetes mellitus with obesity ([facility] )       Cardiovascular and Mediastinum    Coronary artery disease involving native coronary artery of native heart without angina pectoris       Other    Presence of stent in LAD coronary artery    Class 3 severe obesity due to excess calories with serious comorbidity and body mass index (BMI) of 45 0 to 49 9 in [facility])      Other Visit Diagnoses     Screen for colon cancer        Relevant Orders    Ambulatory Referral to Gastroenterology    Family history of colon cancer        Relevant Orders    Ambulatory Referral to Gastroenterology        overall chronic conditions are well controlled  a1c 6 3, continue metformin  BP at goal  Continue coreg and lisinopril  Continue ASA, statin, BB  No cardiac complaints today  Will update labs and follow up in 3 months  Subjective:      Patient ID: Barrie Nye is a 62 y o  female  Pt presents for 3 month follow up     DM: a1c is 6 3 today, on metformin  +ace/statin  HLD: on statin, previously well controlled, due to update lipids  HTN: on coreg, lisinopril, /72 today  CAD: follows with cardiology, on ASA, statin, BB, presence of stent  No cardiac complaints  She overall feels well, no acute complaits       The following portions of the patient's history were reviewed and updated as appropriate:   She  has a past medical history of Diabetes mellitus ([facility] ), Disease of thyroid gland, and Hypertension    She   Patient Active Problem List    Diagnosis Date Noted    Hyperlipidemia associated with type 2 diabetes mellitus ([facility] ) 10/04/2021    Type 2 diabetes mellitus with obesity ([place] [address] ) 10/04/2021    Primary osteoarthritis of both knees 10/04/2021    Coronary artery disease involving native coronary artery of native heart without angina pectoris 07/01/2021    Presence of stent in LAD coronary artery 07/01/2021    Hypothyroidism 07/01/2021    Hypertension complicating diabetes ([place] [address] ) 07/01/2021    Mixed hyperlipidemia 07/01/2021    Type 2 diabetes mellitus without complication, without long-term current use of insulin ([place] [address] ) 07/01/2021    NOLASCO (dyspnea on exertion) 07/01/2021    Class 3 severe obesity due to excess calories with serious comorbidity and body mass index (BMI) of 45 0 to 49 9 in adult [facility]) 07/01/2021    Varicose veins of both lower extremities with pain 07/01/2021    BRCA positive 07/01/2021    History of anemia 07/01/2021     She  has a past surgical history that includes Cardiac surgery  Her family history includes Breast cancer in her maternal aunt and maternal aunt; Cervical cancer in her mother; Diabetes in her daughter; Heart disease in her paternal aunt and paternal aunt; No Known Problems in her maternal grandmother and sister; Prostate cancer in her maternal grandfather  She  reports that she has never smoked  She has never used smokeless tobacco  She reports that she does not drink alcohol and does not use drugs    Current Outpatient Medications   Medication Sig Dispense Refill    Aspirin Low Dose 81 MG EC tablet Take 1 tablet (81 mg total) by mouth daily 90 tablet 1    carvedilol (COREG) 12 5 mg tablet Take 1 tablet (12 5 mg total) by mouth 2 (two) times a day 180 tablet 1    cefuroxime (CEFTIN) 500 mg tablet       Diclofenac Sodium (VOLTAREN) 1 % Apply 2 g topically 4 (four) times a day 100 g 2    ezetimibe-simvastatin (VYTORIN) 10-40 mg per tablet Take 1 tablet by mouth daily at bedtime 90 tablet 1    levothyroxine 175 mcg tablet Take 1 tablet (175 mcg total) by mouth daily Patient takes 7 1/2 tablets by mouth per week  One daily and on Sunday 1 5 tablet 90 tablet 1    lisinopril (ZESTRIL) 20 mg tablet Take 1 tablet (20 mg total) by mouth daily 90 tablet 1    metFORMIN (GLUCOPHAGE) 1000 MG tablet Take 1 tablet (1,000 mg total) by mouth 2 (two) times a day with meals 180 tablet 1    nabumetone (RELAFEN) 750 mg tablet       ofloxacin (FLOXIN) 0 3 % otic solution Administer 10 drops into both ears 2 (two) times a day 5 mL 0    predniSONE 20 mg tablet       promethazine (PHENERGAN) 12 5 mg/10 mL syrup        No current facility-administered medications for this visit  Current Outpatient Medications on File Prior to Visit   Medication Sig    Aspirin Low Dose 81 MG EC tablet Take 1 tablet (81 mg total) by mouth daily    carvedilol (COREG) 12 5 mg tablet Take 1 tablet (12 5 mg total) by mouth 2 (two) times a day    cefuroxime (CEFTIN) 500 mg tablet     Diclofenac Sodium (VOLTAREN) 1 % Apply 2 g topically 4 (four) times a day    ezetimibe-simvastatin (VYTORIN) 10-40 mg per tablet Take 1 tablet by mouth daily at bedtime    levothyroxine 175 mcg tablet Take 1 tablet (175 mcg total) by mouth daily Patient takes 7 1/2 tablets by mouth per week  One daily and on Sunday 1 5 tablet    lisinopril (ZESTRIL) 20 mg tablet Take 1 tablet (20 mg total) by mouth daily    metFORMIN (GLUCOPHAGE) 1000 MG tablet Take 1 tablet (1,000 mg total) by mouth 2 (two) times a day with meals    nabumetone (RELAFEN) 750 mg tablet     ofloxacin (FLOXIN) 0 3 % otic solution Administer 10 drops into both ears 2 (two) times a day    predniSONE 20 mg tablet     promethazine (PHENERGAN) 12 5 mg/10 mL syrup      No current facility-administered medications on file prior to visit  She is allergic to kiwi extract - food allergy and latex       Review of Systems   Constitutional: Negative for chills, fatigue and fever     HENT: Negative for congestion, ear pain, hearing loss, nosebleeds, postnasal drip, rhinorrhea, sinus pressure, sinus pain, sneezing and sore throat  Eyes: Negative for pain, discharge, itching and visual disturbance  Respiratory: Negative for cough, chest tightness, shortness of breath and wheezing  Cardiovascular: Negative for chest pain, palpitations and leg swelling  Gastrointestinal: Negative for abdominal pain, blood in stool, constipation, diarrhea, nausea and vomiting  Genitourinary: Negative for frequency and urgency  Musculoskeletal: Negative  Skin: Negative  Neurological: Negative for dizziness, light-headedness and numbness  Objective:      /72   Pulse 92   Temp 98 °F (36 7 °C)   Ht 5' 6" (1 676 m)   Wt 129 kg (285 lb)   LMP 09/17/1999 (Exact Date)   SpO2 98%   BMI 46 00 kg/m²          Physical Exam  Vitals and nursing note reviewed  Constitutional:       General: She is not in acute distress  Appearance: Normal appearance  She is obese  HENT:      Head: Normocephalic and atraumatic  Nose: Nose normal       Mouth/Throat:      Mouth: Mucous membranes are moist       Pharynx: Oropharynx is clear  No oropharyngeal exudate or posterior oropharyngeal erythema  Eyes:      Pupils: Pupils are equal, round, and reactive to light  Cardiovascular:      Rate and Rhythm: Normal rate and regular rhythm  Pulses: Normal pulses  Heart sounds: Normal heart sounds  No murmur heard  Pulmonary:      Effort: Pulmonary effort is normal  No respiratory distress  Breath sounds: Normal breath sounds  No wheezing, rhonchi or rales  Musculoskeletal:         General: Normal range of motion  Cervical back: Normal range of motion and neck supple  Right lower leg: No edema  Left lower leg: No edema  Skin:     General: Skin is warm and dry  Neurological:      Mental Status: She is alert and oriented to person, place, and time     Psychiatric:         Mood and Affect: Mood and affect normal  BMI Counseling: Body mass index is 46 kg/m²  The BMI is above normal  Nutrition recommendations include reducing portion sizes, decreasing overall calorie intake, moderation in carbohydrate intake, increasing intake of lean protein, reducing intake of saturated fat and trans fat and reducing intake of cholesterol  Exercise recommendations include moderate aerobic physical activity for 150 minutes/week

## 2022-01-20 NOTE — PACU DISCHARGE NOTE - NS MD DISCHARGE NOTE DISCHARGE
Home PROCEDURE NOTE: Avastin 1.25mg OD. Diagnosis: Diabetic Macular Edema. Anesthesia: Lidocaine 4%. Prep: Betadine Drops. Prior to injection, risks/benefits/alternatives discussed including infection, loss of vision, hemorrhage, cataract, glaucoma, retinal tears or detachment. The off-label status of Intravitreal Avastin also was reviewed. The patient wished to proceed with treatment. Topical anesthesia was induced with Alcaine. Additional anesthesia was achieved using drop(s) or injection checked above. A drop of Povidone-iodine 5% ophthalmic solution was instilled over the injection site and in the inferior fornix. Betadine prep was performed. Using the syringe provided, Avastin 1.25 mg in 0.05 cc was injected into the vitreous cavity. The needle was passed 3.0 mm posterior to the limbus in pseudophakic patients, and 3.5 mm posterior to the limbus in phakic patients. Patient tolerated procedure well. There were no complications. Injection time: *. Lot #: [id number]. Expiration Date: 4/26/2022. Post-op instructions given. Inventory Id: null. The patient was instructed to return for re-evaluation in approximately 4-12 weeks depending on his/her condition and was told to call immediately if vision decreases and/or if his/her eye becomes red, painful, and/or light sensitive. The patient was instructed to go to the emergency room or call 911 if unable to reach the doctor within an hour or two of trying or calling. The patient was instructed to use [employer] q.i.d. p.r.n for comfort. Aretha Nathan

## 2022-01-25 LAB
ALBUMIN SERPL-MCNC: 3.9 G/DL (ref 3.6–5.1)
ALBUMIN/GLOB SERPL: 1.5 (CALC) (ref 1–2.5)
ALP SERPL-CCNC: 75 U/L (ref 37–153)
ALT SERPL-CCNC: 23 U/L (ref 6–29)
AST SERPL-CCNC: 18 U/L (ref 10–35)
BILIRUB SERPL-MCNC: 0.4 MG/DL (ref 0.2–1.2)
BUN SERPL-MCNC: 12 MG/DL (ref 7–25)
BUN/CREAT SERPL: ABNORMAL (CALC) (ref 6–22)
CALCIUM SERPL-MCNC: 9.4 MG/DL (ref 8.6–10.4)
CHLORIDE SERPL-SCNC: 107 MMOL/L (ref 98–110)
CHOLEST SERPL-MCNC: 146 MG/DL
CHOLEST/HDLC SERPL: 2.9 (CALC)
CO2 SERPL-SCNC: 27 MMOL/L (ref 20–32)
CREAT SERPL-MCNC: 0.63 MG/DL (ref 0.5–0.99)
GLOBULIN SER CALC-MCNC: 2.6 G/DL (CALC) (ref 1.9–3.7)
GLUCOSE SERPL-MCNC: 110 MG/DL (ref 65–99)
HDLC SERPL-MCNC: 51 MG/DL
LDLC SERPL CALC-MCNC: 75 MG/DL (CALC)
NONHDLC SERPL-MCNC: 95 MG/DL (CALC)
POTASSIUM SERPL-SCNC: 4.6 MMOL/L (ref 3.5–5.3)
PROT SERPL-MCNC: 6.5 G/DL (ref 6.1–8.1)
SL AMB EGFR AFRICAN AMERICAN: 113 ML/MIN/1.73M2
SL AMB EGFR NON AFRICAN AMERICAN: 97 ML/MIN/1.73M2
SODIUM SERPL-SCNC: 141 MMOL/L (ref 135–146)
TRIGL SERPL-MCNC: 110 MG/DL

## 2022-01-26 DIAGNOSIS — E03.9 HYPOTHYROIDISM, UNSPECIFIED TYPE: Primary | ICD-10-CM

## 2022-02-16 DIAGNOSIS — I25.10 CORONARY ARTERY DISEASE INVOLVING NATIVE CORONARY ARTERY OF NATIVE HEART WITHOUT ANGINA PECTORIS: ICD-10-CM

## 2022-02-16 DIAGNOSIS — E11.9 TYPE 2 DIABETES MELLITUS WITHOUT COMPLICATION, WITHOUT LONG-TERM CURRENT USE OF INSULIN (HCC): ICD-10-CM

## 2022-02-16 RX ORDER — ASPIRIN 81 MG/1
TABLET ORAL
Qty: 90 TABLET | Refills: 1 | Status: SHIPPED | OUTPATIENT
Start: 2022-02-16

## 2022-04-14 ENCOUNTER — OFFICE VISIT (OUTPATIENT)
Dept: FAMILY MEDICINE CLINIC | Facility: CLINIC | Age: 62
End: 2022-04-14
Payer: COMMERCIAL

## 2022-04-14 VITALS
SYSTOLIC BLOOD PRESSURE: 132 MMHG | HEART RATE: 71 BPM | WEIGHT: 279.2 LBS | HEIGHT: 66 IN | OXYGEN SATURATION: 98 % | TEMPERATURE: 97.2 F | BODY MASS INDEX: 44.87 KG/M2 | DIASTOLIC BLOOD PRESSURE: 68 MMHG

## 2022-04-14 DIAGNOSIS — E11.69 HYPERLIPIDEMIA ASSOCIATED WITH TYPE 2 DIABETES MELLITUS (HCC): ICD-10-CM

## 2022-04-14 DIAGNOSIS — E78.5 HYPERLIPIDEMIA ASSOCIATED WITH TYPE 2 DIABETES MELLITUS (HCC): ICD-10-CM

## 2022-04-14 DIAGNOSIS — L65.9 HAIR LOSS: ICD-10-CM

## 2022-04-14 DIAGNOSIS — I15.2 HYPERTENSION COMPLICATING DIABETES (HCC): ICD-10-CM

## 2022-04-14 DIAGNOSIS — I25.10 CORONARY ARTERY DISEASE INVOLVING NATIVE CORONARY ARTERY OF NATIVE HEART WITHOUT ANGINA PECTORIS: ICD-10-CM

## 2022-04-14 DIAGNOSIS — Z80.0 FAMILY HISTORY OF COLON CANCER: ICD-10-CM

## 2022-04-14 DIAGNOSIS — E11.69 TYPE 2 DIABETES MELLITUS WITH OBESITY (HCC): ICD-10-CM

## 2022-04-14 DIAGNOSIS — Z12.11 SCREENING FOR COLON CANCER: ICD-10-CM

## 2022-04-14 DIAGNOSIS — E66.9 TYPE 2 DIABETES MELLITUS WITH OBESITY (HCC): ICD-10-CM

## 2022-04-14 DIAGNOSIS — E11.9 TYPE 2 DIABETES MELLITUS WITHOUT COMPLICATION, UNSPECIFIED WHETHER LONG TERM INSULIN USE (HCC): ICD-10-CM

## 2022-04-14 DIAGNOSIS — E11.59 HYPERTENSION COMPLICATING DIABETES (HCC): ICD-10-CM

## 2022-04-14 DIAGNOSIS — E11.9 TYPE 2 DIABETES MELLITUS WITHOUT COMPLICATION, WITHOUT LONG-TERM CURRENT USE OF INSULIN (HCC): Primary | ICD-10-CM

## 2022-04-14 DIAGNOSIS — Z86.010 PERSONAL HISTORY OF COLONIC POLYPS: ICD-10-CM

## 2022-04-14 DIAGNOSIS — M17.0 PRIMARY OSTEOARTHRITIS OF BOTH KNEES: ICD-10-CM

## 2022-04-14 PROBLEM — Z86.0100 PERSONAL HISTORY OF COLONIC POLYPS: Status: ACTIVE | Noted: 2022-04-14

## 2022-04-14 LAB — SL AMB POCT HEMOGLOBIN AIC: 6 (ref ?–6.5)

## 2022-04-14 PROCEDURE — 99214 OFFICE O/P EST MOD 30 MIN: CPT | Performed by: PHYSICIAN ASSISTANT

## 2022-04-14 PROCEDURE — 83036 HEMOGLOBIN GLYCOSYLATED A1C: CPT | Performed by: PHYSICIAN ASSISTANT

## 2022-04-14 PROCEDURE — 3044F HG A1C LEVEL LT 7.0%: CPT | Performed by: PHYSICIAN ASSISTANT

## 2022-04-14 PROCEDURE — 1036F TOBACCO NON-USER: CPT | Performed by: PHYSICIAN ASSISTANT

## 2022-04-14 NOTE — PROGRESS NOTES
Assessment/Plan:       Problem List Items Addressed This Visit        Endocrine    Hypertension complicating diabetes ([facility] )    Relevant Orders    CBC and differential    Type 2 diabetes mellitus without complication, without long-term current use of insulin ([facility] ) - Primary    Relevant Orders    POCT hemoglobin A1c (Completed)    Comprehensive metabolic panel    Hyperlipidemia associated with type 2 diabetes mellitus ([facility] )    Relevant Orders    Lipid Panel with Direct LDL reflex    Type 2 diabetes mellitus with obesity ([facility] )       Cardiovascular and Mediastinum    Coronary artery disease involving native coronary artery of native heart without angina pectoris       Musculoskeletal and Integument    Primary osteoarthritis of both knees      Other Visit Diagnoses     Hair loss        Screening for colon cancer        Type 2 diabetes mellitus without complication, unspecified whether long term insulin use ([place] [address] )            DM well controlled, continue metformin  BP at goal  Update labs and HM as above  Dizziness likely vertigo given hx  No other neuro/cardiac complaints  Sx unchanged from baseline  Discussed supportive measures  Vestibular therapy if worsens  Ortho, tylenol for bilateral knee osteoarthritis  Follow with GI for updated colonoscopy  3 month follow up, earlier prn    Subjective:      Patient ID: Stacey Herrera is a 62 y o  female  Pt presents for 3 month follow up    DM: a1c down to 6 0  on metformin  On ace, statin  HLD: due for FLP, historically well controlled on simvastatin ezitimbe  HTN: on lisinopril, coreg, /68    She has chronic dizziness, intermittent, unchanged  She shares its normally in the morning or when stanidng/moving  Feels like room is spinning  No associated SOB, CP, syncope   She shares she wishes to have her platelets checked because they are historically low and she believes this worsens her dizziness    She has hair thinning/loss and would like to see a dermatologist      She has bilateral knee OA and follows with ortho, she inquires about ibuprofen    She has a FH of CRC  She has a PMH of polyps, she has upcoming GI appt 4/29      The following portions of the patient's history were reviewed and updated as appropriate:   She  has a past medical history of Diabetes mellitus ([place] [address] ), Disease of thyroid gland, and Hypertension  She   Patient Active Problem List    Diagnosis Date Noted    Hyperlipidemia associated with type 2 diabetes mellitus ([place] [address] ) 10/04/2021    Type 2 diabetes mellitus with obesity ([place] [address] ) 10/04/2021    Primary osteoarthritis of both knees 10/04/2021    Coronary artery disease involving native coronary artery of native heart without angina pectoris 07/01/2021    Presence of stent in LAD coronary artery 07/01/2021    Hypothyroidism 07/01/2021    Hypertension complicating diabetes ([place] [address] ) 07/01/2021    Mixed hyperlipidemia 07/01/2021    Type 2 diabetes mellitus without complication, without long-term current use of insulin ([place] [address] ) 07/01/2021    NOLASCO (dyspnea on exertion) 07/01/2021    Class 3 severe obesity due to excess calories with serious comorbidity and body mass index (BMI) of 45 0 to 49 9 in adult [facility]) 07/01/2021    Varicose veins of both lower extremities with pain 07/01/2021    BRCA positive 07/01/2021    History of anemia 07/01/2021     She  has a past surgical history that includes Cardiac surgery  Her family history includes Breast cancer in her maternal aunt and maternal aunt; Cervical cancer in her mother; Diabetes in her daughter; Heart disease in her paternal aunt and paternal aunt; No Known Problems in her maternal grandmother and sister; Prostate cancer in her maternal grandfather  She  reports that she has never smoked  She has never used smokeless tobacco  She reports that she does not drink alcohol and does not use drugs    Current Outpatient Medications   Medication Sig Dispense Refill    aspirin (ECOTRIN LOW STRENGTH) 81 mg EC tablet TAKE 1 TABLET BY MOUTH EVERY DAY 90 tablet 1    carvedilol (COREG) 12 5 mg tablet Take 1 tablet (12 5 mg total) by mouth 2 (two) times a day 180 tablet 1    Diclofenac Sodium (VOLTAREN) 1 % Apply 2 g topically 4 (four) times a day 100 g 2    ezetimibe-simvastatin (VYTORIN) 10-40 mg per tablet Take 1 tablet by mouth daily at bedtime 90 tablet 1    levothyroxine 175 mcg tablet Take 1 tablet (175 mcg total) by mouth daily Patient takes 7 1/2 tablets by mouth per week  One daily and on Sunday 1 5 tablet 90 tablet 1    lisinopril (ZESTRIL) 20 mg tablet Take 1 tablet (20 mg total) by mouth daily 90 tablet 1    metFORMIN (GLUCOPHAGE) 1000 MG tablet TAKE 1 TABLET BY MOUTH TWICE A DAY WITH MEALS 180 tablet 1    ofloxacin (FLOXIN) 0 3 % otic solution Administer 10 drops into both ears 2 (two) times a day 5 mL 0    predniSONE 20 mg tablet       cefuroxime (CEFTIN) 500 mg tablet  (Patient not taking: Reported on 4/14/2022 )      nabumetone (RELAFEN) 750 mg tablet       promethazine (PHENERGAN) 12 5 mg/10 mL syrup  (Patient not taking: Reported on 4/14/2022 )       No current facility-administered medications for this visit  Current Outpatient Medications on File Prior to Visit   Medication Sig    aspirin (ECOTRIN LOW STRENGTH) 81 mg EC tablet TAKE 1 TABLET BY MOUTH EVERY DAY    carvedilol (COREG) 12 5 mg tablet Take 1 tablet (12 5 mg total) by mouth 2 (two) times a day    Diclofenac Sodium (VOLTAREN) 1 % Apply 2 g topically 4 (four) times a day    ezetimibe-simvastatin (VYTORIN) 10-40 mg per tablet Take 1 tablet by mouth daily at bedtime    levothyroxine 175 mcg tablet Take 1 tablet (175 mcg total) by mouth daily Patient takes 7 1/2 tablets by mouth per week    One daily and on Sunday 1 5 tablet    lisinopril (ZESTRIL) 20 mg tablet Take 1 tablet (20 mg total) by mouth daily    metFORMIN (GLUCOPHAGE) 1000 MG tablet TAKE 1 TABLET BY MOUTH TWICE A DAY WITH MEALS    ofloxacin (FLOXIN) 0 3 % otic solution Administer 10 drops into both ears 2 (two) times a day    predniSONE 20 mg tablet     cefuroxime (CEFTIN) 500 mg tablet  (Patient not taking: Reported on 4/14/2022 )    nabumetone (RELAFEN) 750 mg tablet     promethazine (PHENERGAN) 12 5 mg/10 mL syrup  (Patient not taking: Reported on 4/14/2022 )     No current facility-administered medications on file prior to visit  She is allergic to kiwi extract - food allergy and latex       Review of Systems   Constitutional: Negative for chills, fatigue and fever  HENT: Negative for congestion, ear pain, hearing loss, nosebleeds, postnasal drip, rhinorrhea, sinus pressure, sinus pain, sneezing and sore throat  Eyes: Negative for pain, discharge, itching and visual disturbance  Respiratory: Negative for cough, chest tightness, shortness of breath and wheezing  Cardiovascular: Negative for chest pain, palpitations and leg swelling  Gastrointestinal: Negative for abdominal pain, blood in stool, constipation, diarrhea, nausea and vomiting  Genitourinary: Negative for frequency and urgency  Musculoskeletal: Positive for arthralgias  Neurological: Positive for dizziness  Negative for light-headedness, numbness and headaches  Objective:      /68   Pulse 71   Temp (!) 97 2 °F (36 2 °C)   Ht 5' 6" (1 676 m)   Wt 127 kg (279 lb 3 2 oz)   LMP 09/17/1999 (Exact Date)   SpO2 98%   BMI 45 06 kg/m²          Physical Exam  Vitals and nursing note reviewed  Constitutional:       General: She is not in acute distress  Appearance: Normal appearance  HENT:      Head: Normocephalic and atraumatic  Nose: Nose normal       Mouth/Throat:      Mouth: Mucous membranes are moist       Pharynx: Oropharynx is clear  No oropharyngeal exudate or posterior oropharyngeal erythema  Eyes:      Pupils: Pupils are equal, round, and reactive to light  Cardiovascular:      Rate and Rhythm: Normal rate and regular rhythm  Heart sounds: Normal heart sounds  No murmur heard  Pulmonary:      Effort: Pulmonary effort is normal  No respiratory distress  Breath sounds: Normal breath sounds  No wheezing, rhonchi or rales  Abdominal:      General: Bowel sounds are normal  There is no distension  Palpations: Abdomen is soft  Tenderness: There is no abdominal tenderness  There is no guarding  Musculoskeletal:         General: Normal range of motion  Cervical back: Normal range of motion and neck supple  Right lower leg: No edema  Left lower leg: No edema  Skin:     General: Skin is warm and dry  Neurological:      Mental Status: She is alert and oriented to person, place, and time  Psychiatric:         Mood and Affect: Mood and affect normal            BMI Counseling: Body mass index is 45 06 kg/m²  The BMI is above normal  Nutrition recommendations include reducing portion sizes, decreasing overall calorie intake, moderation in carbohydrate intake, increasing intake of lean protein, reducing intake of saturated fat and trans fat and reducing intake of cholesterol  Exercise recommendations include moderate aerobic physical activity for 150 minutes/week

## 2022-04-22 LAB
ALBUMIN SERPL-MCNC: 3.9 G/DL (ref 3.6–5.1)
ALBUMIN/GLOB SERPL: 1.7 (CALC) (ref 1–2.5)
ALP SERPL-CCNC: 71 U/L (ref 37–153)
ALT SERPL-CCNC: 17 U/L (ref 6–29)
AST SERPL-CCNC: 15 U/L (ref 10–35)
BASOPHILS # BLD AUTO: 33 CELLS/UL (ref 0–200)
BASOPHILS NFR BLD AUTO: 0.5 %
BILIRUB SERPL-MCNC: 0.3 MG/DL (ref 0.2–1.2)
BUN SERPL-MCNC: 16 MG/DL (ref 7–25)
BUN/CREAT SERPL: ABNORMAL (CALC) (ref 6–22)
CALCIUM SERPL-MCNC: 9.5 MG/DL (ref 8.6–10.4)
CHLORIDE SERPL-SCNC: 106 MMOL/L (ref 98–110)
CHOLEST SERPL-MCNC: 141 MG/DL
CHOLEST/HDLC SERPL: 2.8 (CALC)
CO2 SERPL-SCNC: 32 MMOL/L (ref 20–32)
CREAT SERPL-MCNC: 0.64 MG/DL (ref 0.5–0.99)
EOSINOPHIL # BLD AUTO: 98 CELLS/UL (ref 15–500)
EOSINOPHIL NFR BLD AUTO: 1.5 %
ERYTHROCYTE [DISTWIDTH] IN BLOOD BY AUTOMATED COUNT: 14.4 % (ref 11–15)
GLOBULIN SER CALC-MCNC: 2.3 G/DL (CALC) (ref 1.9–3.7)
GLUCOSE SERPL-MCNC: 101 MG/DL (ref 65–99)
HCT VFR BLD AUTO: 34.5 % (ref 35–45)
HDLC SERPL-MCNC: 51 MG/DL
HGB BLD-MCNC: 11.1 G/DL (ref 11.7–15.5)
LDLC SERPL CALC-MCNC: 70 MG/DL (CALC)
LYMPHOCYTES # BLD AUTO: 1671 CELLS/UL (ref 850–3900)
LYMPHOCYTES NFR BLD AUTO: 25.7 %
MCH RBC QN AUTO: 28.5 PG (ref 27–33)
MCHC RBC AUTO-ENTMCNC: 32.2 G/DL (ref 32–36)
MCV RBC AUTO: 88.7 FL (ref 80–100)
MONOCYTES # BLD AUTO: 657 CELLS/UL (ref 200–950)
MONOCYTES NFR BLD AUTO: 10.1 %
NEUTROPHILS # BLD AUTO: 4043 CELLS/UL (ref 1500–7800)
NEUTROPHILS NFR BLD AUTO: 62.2 %
NONHDLC SERPL-MCNC: 90 MG/DL (CALC)
PLATELET # BLD AUTO: 143 THOUSAND/UL (ref 140–400)
PMV BLD REES-ECKER: 12.5 FL (ref 7.5–12.5)
POTASSIUM SERPL-SCNC: 5 MMOL/L (ref 3.5–5.3)
PROT SERPL-MCNC: 6.2 G/DL (ref 6.1–8.1)
RBC # BLD AUTO: 3.89 MILLION/UL (ref 3.8–5.1)
SL AMB EGFR AFRICAN AMERICAN: 112 ML/MIN/1.73M2
SL AMB EGFR NON AFRICAN AMERICAN: 97 ML/MIN/1.73M2
SODIUM SERPL-SCNC: 142 MMOL/L (ref 135–146)
TRIGL SERPL-MCNC: 115 MG/DL
WBC # BLD AUTO: 6.5 THOUSAND/UL (ref 3.8–10.8)

## 2022-04-29 ENCOUNTER — TELEPHONE (OUTPATIENT)
Dept: GASTROENTEROLOGY | Facility: CLINIC | Age: 62
End: 2022-04-29

## 2022-04-29 ENCOUNTER — CONSULT (OUTPATIENT)
Dept: GASTROENTEROLOGY | Facility: CLINIC | Age: 62
End: 2022-04-29
Payer: COMMERCIAL

## 2022-04-29 VITALS
DIASTOLIC BLOOD PRESSURE: 70 MMHG | OXYGEN SATURATION: 99 % | WEIGHT: 280 LBS | BODY MASS INDEX: 45 KG/M2 | HEART RATE: 80 BPM | SYSTOLIC BLOOD PRESSURE: 120 MMHG | HEIGHT: 66 IN

## 2022-04-29 DIAGNOSIS — Z86.010 HISTORY OF COLON POLYPS: Primary | ICD-10-CM

## 2022-04-29 DIAGNOSIS — Z12.11 SCREEN FOR COLON CANCER: ICD-10-CM

## 2022-04-29 DIAGNOSIS — Z80.0 FAMILY HISTORY OF COLON CANCER: ICD-10-CM

## 2022-04-29 PROCEDURE — 99203 OFFICE O/P NEW LOW 30 MIN: CPT | Performed by: PHYSICIAN ASSISTANT

## 2022-04-29 PROCEDURE — 3008F BODY MASS INDEX DOCD: CPT | Performed by: PHYSICIAN ASSISTANT

## 2022-04-29 NOTE — PROGRESS NOTES
[facility] - Outpatient Consultation  Tong Mallory 62 y o  female MRN: [id number]  Encounter: [id number]          ASSESSMENT AND PLAN:      1  Screen for colon cancer  2  Family history of colon cancer  Brother  3  History of colon polyps  Her last colonoscopy was approximately 5 years ago  Will update surveillance at this time    ______________________________________________________________________    HPI:  81-year-old female presents for evaluation prior to scheduling a colonoscopy  Her last colonoscopy was approximately 5 years ago in [place]  She notes a personal history of colon polyps and a family history of colorectal cancer in her brother  She denies any gastrointestinal symptoms at present such as abdominal pain, diarrhea, constipation or rectal bleeding  She denies any unexpected weight loss  She reports that during 1 bowel prep that she underwent she became severely dehydrated  Otherwise she has never had any serious issues with her colonoscopies in the past       REVIEW OF SYSTEMS:    CONSTITUTIONAL: Denies any fever, chills, rigors, and weight loss  HEENT: No earache or tinnitus  Denies hearing loss or visual disturbances  CARDIOVASCULAR: No chest pain or palpitations  RESPIRATORY: Denies any cough, hemoptysis, shortness of breath or dyspnea on exertion  GASTROINTESTINAL: As noted in the History of Present Illness  GENITOURINARY: No problems with urination  Denies any hematuria or dysuria  NEUROLOGIC: No dizziness or vertigo, denies headaches  MUSCULOSKELETAL: Denies any muscle or joint pain  SKIN: Denies skin rashes or itching  ENDOCRINE: Denies excessive thirst  Denies intolerance to heat or cold  PSYCHOSOCIAL: Denies depression or anxiety  Denies any recent memory loss         Historical Information   Past Medical History:   Diagnosis Date    Diabetes mellitus ([address] )     Disease of thyroid gland     Hypertension      Past Surgical History: Procedure Laterality Date    CARDIAC SURGERY       Social History   Social History     Substance and Sexual Activity   Alcohol Use Never     Social History     Substance and Sexual Activity   Drug Use Never     Social History     Tobacco Use   Smoking Status Never Smoker   Smokeless Tobacco Never Used     Family History   Problem Relation Age of Onset    Cervical cancer Mother         hysterectomy    Breast cancer Maternal Aunt         masectomy    Breast cancer Maternal Aunt         found lump in nipple    No Known Problems Sister     Diabetes Daughter     No Known Problems Maternal Grandmother     Prostate cancer Maternal Grandfather     Heart disease Paternal Aunt     Heart disease Paternal Aunt        Meds/Allergies       Current Outpatient Medications:     aspirin (ECOTRIN LOW STRENGTH) 81 mg EC tablet    carvedilol (COREG) 12 5 mg tablet    Diclofenac Sodium (VOLTAREN) 1 %    ezetimibe-simvastatin (VYTORIN) 10-40 mg per tablet    levothyroxine 175 mcg tablet    lisinopril (ZESTRIL) 20 mg tablet    metFORMIN (GLUCOPHAGE) 1000 MG tablet    nabumetone (RELAFEN) 750 mg tablet    ofloxacin (FLOXIN) 0 3 % otic solution    predniSONE 20 mg tablet    cefuroxime (CEFTIN) 500 mg tablet    promethazine (PHENERGAN) 12 5 mg/10 mL syrup    Allergies   Allergen Reactions    Kiwi Extract - Food Allergy Anaphylaxis    Latex Hives           Objective     Blood pressure 120/70, pulse 80, height 5' 6" (1 676 m), weight 127 kg (280 lb), last menstrual period 09/17/1999, SpO2 99 %, not currently breastfeeding  Body mass index is 45 19 kg/m²  PHYSICAL EXAM:      General Appearance:   Alert, cooperative, no distress   HEENT:   Normocephalic, atraumatic, anicteric      Neck:  Supple, symmetrical, trachea midline   Lungs:   Clear to auscultation bilaterally; no rales, rhonchi or wheezing; respirations unlabored    Heart[de-identified]   Regular rate and rhythm; no murmur, rub, or gallop     Abdomen:   Soft, non-tender, non-distended; normal bowel sounds; no masses, no organomegaly    Genitalia:   Deferred    Rectal:   Deferred    Extremities:  No cyanosis, clubbing or edema    Pulses:  2+ and symmetric    Skin:  No jaundice, rashes, or lesions    Lymph nodes:  No palpable cervical lymphadenopathy        Lab Results:   No visits with results within 1 Day(s) from this visit     Latest known visit with results is:   Orders Only on 04/22/2022   Component Date Value    Total Cholesterol 04/22/2022 141     HDL 04/22/2022 51     Triglycerides 04/22/2022 115     LDL Calculated 04/22/2022 70     Chol HDLC Ratio 04/22/2022 2 8     Non-HDL Cholesterol 04/22/2022 90     Glucose, Random 04/22/2022 101*    BUN 04/22/2022 16     Creatinine 04/22/2022 0 64     eGFR Non African American 04/22/2022 97     eGFR  04/22/2022 112     SL AMB BUN/CREATININE RA* 51/67/4563 NOT APPLICABLE     Sodium 60/31/6347 142     Potassium 04/22/2022 5 0     Chloride 04/22/2022 106     CO2 04/22/2022 32     Calcium 04/22/2022 9 5     Protein, Total 04/22/2022 6 2     Albumin 04/22/2022 3 9     Globulin 04/22/2022 2 3     Albumin/Globulin Ratio 04/22/2022 1 7     TOTAL BILIRUBIN 04/22/2022 0 3     Alkaline Phosphatase 04/22/2022 71     AST 04/22/2022 15     ALT 04/22/2022 17     White Blood Cell Count 04/22/2022 6 5     Red Blood Cell Count 04/22/2022 3 89     Hemoglobin 04/22/2022 11 1*    HCT 04/22/2022 34 5*    MCV 04/22/2022 88 7     MCH 04/22/2022 28 5     MCHC 04/22/2022 32 2     RDW 04/22/2022 14 4     Platelet Count 71/64/0269 143     SL AMB MPV 04/22/2022 12 5     Neutrophils (Absolute) 04/22/2022 4,043     Lymphocytes (Absolute) 04/22/2022 1,671     Monocytes (Absolute) 04/22/2022 657     Eosinophils (Absolute) 04/22/2022 98     Basophils ABS 04/22/2022 33     Neutrophils 04/22/2022 62 2     Lymphocytes 04/22/2022 25 7     Monocytes 04/22/2022 10 1     Eosinophils 04/22/2022 1 5     Basophils PCT 04/22/2022 0 5          Radiology Results:   No results found

## 2022-05-31 DIAGNOSIS — I10 ESSENTIAL HYPERTENSION: ICD-10-CM

## 2022-05-31 DIAGNOSIS — E11.9 TYPE 2 DIABETES MELLITUS WITHOUT COMPLICATION, WITHOUT LONG-TERM CURRENT USE OF INSULIN (HCC): ICD-10-CM

## 2022-05-31 DIAGNOSIS — E78.2 MIXED HYPERLIPIDEMIA: ICD-10-CM

## 2022-05-31 DIAGNOSIS — I25.10 CORONARY ARTERY DISEASE INVOLVING NATIVE CORONARY ARTERY OF NATIVE HEART WITHOUT ANGINA PECTORIS: ICD-10-CM

## 2022-05-31 DIAGNOSIS — E03.9 HYPOTHYROIDISM, UNSPECIFIED TYPE: ICD-10-CM

## 2022-05-31 PROCEDURE — 4010F ACE/ARB THERAPY RXD/TAKEN: CPT | Performed by: PHYSICIAN ASSISTANT

## 2022-05-31 RX ORDER — CARVEDILOL 12.5 MG/1
12.5 TABLET ORAL 2 TIMES DAILY
Qty: 180 TABLET | Refills: 1 | Status: SHIPPED | OUTPATIENT
Start: 2022-05-31

## 2022-05-31 RX ORDER — LEVOTHYROXINE SODIUM 175 UG/1
175 TABLET ORAL DAILY
Qty: 90 TABLET | Refills: 1 | Status: SHIPPED | OUTPATIENT
Start: 2022-05-31

## 2022-05-31 RX ORDER — EZETIMIBE AND SIMVASTATIN 10; 40 MG/1; MG/1
1 TABLET ORAL
Qty: 90 TABLET | Refills: 1 | Status: SHIPPED | OUTPATIENT
Start: 2022-05-31

## 2022-05-31 RX ORDER — LISINOPRIL 20 MG/1
20 TABLET ORAL DAILY
Qty: 90 TABLET | Refills: 1 | Status: SHIPPED | OUTPATIENT
Start: 2022-05-31

## 2022-06-20 ENCOUNTER — ANESTHESIA EVENT (OUTPATIENT)
Dept: GASTROENTEROLOGY | Facility: HOSPITAL | Age: 62
End: 2022-06-20

## 2022-06-20 ENCOUNTER — HOSPITAL ENCOUNTER (OUTPATIENT)
Dept: GASTROENTEROLOGY | Facility: HOSPITAL | Age: 62
Setting detail: OUTPATIENT SURGERY
Discharge: HOME | End: 2022-06-20
Payer: COMMERCIAL

## 2022-06-20 ENCOUNTER — ANESTHESIA (OUTPATIENT)
Dept: GASTROENTEROLOGY | Facility: HOSPITAL | Age: 62
End: 2022-06-20

## 2022-06-20 VITALS
OXYGEN SATURATION: 100 % | BODY MASS INDEX: 45.35 KG/M2 | SYSTOLIC BLOOD PRESSURE: 102 MMHG | HEART RATE: 70 BPM | HEIGHT: 66 IN | WEIGHT: 282.19 LBS | DIASTOLIC BLOOD PRESSURE: 59 MMHG | TEMPERATURE: 97.6 F | RESPIRATION RATE: 18 BRPM

## 2022-06-20 DIAGNOSIS — Z86.010 HISTORY OF COLON POLYPS: ICD-10-CM

## 2022-06-20 DIAGNOSIS — Z80.0 FAMILY HISTORY OF COLON CANCER: ICD-10-CM

## 2022-06-20 DIAGNOSIS — Z12.11 SCREEN FOR COLON CANCER: ICD-10-CM

## 2022-06-20 LAB — GLUCOSE SERPL-MCNC: 141 MG/DL (ref 65–140)

## 2022-06-20 PROCEDURE — 82948 REAGENT STRIP/BLOOD GLUCOSE: CPT

## 2022-06-20 PROCEDURE — 45378 DIAGNOSTIC COLONOSCOPY: CPT | Performed by: INTERNAL MEDICINE

## 2022-06-20 RX ORDER — SODIUM CHLORIDE, SODIUM LACTATE, POTASSIUM CHLORIDE, CALCIUM CHLORIDE 600; 310; 30; 20 MG/100ML; MG/100ML; MG/100ML; MG/100ML
INJECTION, SOLUTION INTRAVENOUS CONTINUOUS PRN
Status: DISCONTINUED | OUTPATIENT
Start: 2022-06-20 | End: 2022-06-20

## 2022-06-20 RX ORDER — PROPOFOL 10 MG/ML
INJECTION, EMULSION INTRAVENOUS AS NEEDED
Status: DISCONTINUED | OUTPATIENT
Start: 2022-06-20 | End: 2022-06-20

## 2022-06-20 RX ORDER — SODIUM CHLORIDE, SODIUM LACTATE, POTASSIUM CHLORIDE, CALCIUM CHLORIDE 600; 310; 30; 20 MG/100ML; MG/100ML; MG/100ML; MG/100ML
125 INJECTION, SOLUTION INTRAVENOUS CONTINUOUS
Status: DISCONTINUED | OUTPATIENT
Start: 2022-06-20 | End: 2022-06-24 | Stop reason: HOSPADM

## 2022-06-20 RX ORDER — LIDOCAINE HYDROCHLORIDE 10 MG/ML
INJECTION, SOLUTION EPIDURAL; INFILTRATION; INTRACAUDAL; PERINEURAL AS NEEDED
Status: DISCONTINUED | OUTPATIENT
Start: 2022-06-20 | End: 2022-06-20

## 2022-06-20 RX ADMIN — SODIUM CHLORIDE, SODIUM LACTATE, POTASSIUM CHLORIDE, AND CALCIUM CHLORIDE 125 ML/HR: .6; .31; .03; .02 INJECTION, SOLUTION INTRAVENOUS at 07:06

## 2022-06-20 RX ADMIN — PROPOFOL 20 MG: 10 INJECTION, EMULSION INTRAVENOUS at 07:30

## 2022-06-20 RX ADMIN — PROPOFOL 40 MG: 10 INJECTION, EMULSION INTRAVENOUS at 07:21

## 2022-06-20 RX ADMIN — PROPOFOL 20 MG: 10 INJECTION, EMULSION INTRAVENOUS at 07:26

## 2022-06-20 RX ADMIN — SODIUM CHLORIDE, SODIUM LACTATE, POTASSIUM CHLORIDE, AND CALCIUM CHLORIDE: .6; .31; .03; .02 INJECTION, SOLUTION INTRAVENOUS at 07:08

## 2022-06-20 RX ADMIN — LIDOCAINE HYDROCHLORIDE 50 MG: 10 INJECTION, SOLUTION EPIDURAL; INFILTRATION; INTRACAUDAL at 07:15

## 2022-06-20 RX ADMIN — PROPOFOL 100 MG: 10 INJECTION, EMULSION INTRAVENOUS at 07:17

## 2022-06-20 NOTE — ANESTHESIA POSTPROCEDURE EVALUATION
Post-Op Assessment Note    CV Status:  Stable  Pain Score: 0    Pain management: adequate     Mental Status:  Alert and awake   Hydration Status:  Stable   PONV Controlled:  None   Airway Patency:  Patent and adequate      Post Op Vitals Reviewed: Yes      Staff: Anesthesiologist, CRNA         No complications documented      BP 94/51 (06/20/22 0736)    Temp 97 6 °F (36 4 °C) (06/20/22 0736)    Pulse 77 (06/20/22 0736)   Resp 18 (06/20/22 0736)    SpO2 100 % (06/20/22 0736)

## 2022-06-20 NOTE — ANESTHESIA PREPROCEDURE EVALUATION
Procedure:  COLONOSCOPY    Relevant Problems   CARDIO   (+) Coronary artery disease involving native coronary artery of native heart without angina pectoris   (+) Hyperlipidemia associated with type 2 diabetes mellitus (HCC)   (+) Hypertension complicating diabetes ([address] )   (+) Mixed hyperlipidemia   (+) Presence of stent in LAD coronary artery   (+) Varicose veins of both lower extremities with pain      ENDO   (+) Hypothyroidism   (+) Type 2 diabetes mellitus with obesity (HCC)   (+) Type 2 diabetes mellitus without complication, without long-term current use of insulin (HCC)      NEURO/PSYCH   (+) History of anemia   (+) Personal history of colonic polyps      Other   (+) Class 3 severe obesity due to excess calories with serious comorbidity and body mass index (BMI) of 45 0 to 49 9 in [facility])   per last cardiology note: I recommended she have a stress test to assess her current cardiac status  Insurance would only pay for a small part of that and she was able to show me a stress test done in 2020, after her previous stent (11 yrs ago), that was negative  This should be sufficient  Physical Exam    Airway    Mallampati score: II  TM Distance: >3 FB  Neck ROM: full     Dental   Comment: Denies loose teeth,     Cardiovascular  Cardiovascular exam normal    Pulmonary  Pulmonary exam normal     Other Findings  Portions of exam deferred due to low yield and/or unknown COVID status      Anesthesia Plan  ASA Score- 3     Anesthesia Type- IV sedation with anesthesia with ASA Monitors  Additional Monitors:   Airway Plan:           Plan Factors-Exercise tolerance (METS): >4 METS  Chart reviewed  Existing labs reviewed  Patient summary reviewed  Patient is not a current smoker  Induction- intravenous  Postoperative Plan-     Informed Consent- Anesthetic plan and risks discussed with patient  I personally reviewed this patient with the CRNA   Discussed and agreed on the Anesthesia Plan with the FREDI Cancino

## 2022-06-20 NOTE — H&P
History and Physical -  [facility]  Tong Wagner Steph 62 y o  female MRN: [id number]      HPI: Maritza Todd is a 62y o  year old female who presents for a family history for colon cancer and personal history for colon polyps      REVIEW OF SYSTEMS: Per the HPI, and otherwise unremarkable  Historical Information   Past Medical History:   Diagnosis Date    Diabetes mellitus ([address] )     Disease of thyroid gland     Hypertension      Past Surgical History:   Procedure Laterality Date    CARDIAC SURGERY       Social History   Social History     Substance and Sexual Activity   Alcohol Use Never     Social History     Substance and Sexual Activity   Drug Use Never     Social History     Tobacco Use   Smoking Status Never Smoker   Smokeless Tobacco Never Used     Family History   Problem Relation Age of Onset    Cervical cancer Mother         hysterectomy    Breast cancer Maternal Aunt         masectomy    Breast cancer Maternal Aunt         found lump in nipple    No Known Problems Sister     Diabetes Daughter     No Known Problems Maternal Grandmother     Prostate cancer Maternal Grandfather     Heart disease Paternal Aunt     Heart disease Paternal Aunt        Meds/Allergies     (Not in a hospital admission)      Allergies   Allergen Reactions    Kiwi Extract - Food Allergy Anaphylaxis    Shrimp (Diagnostic) - Food Allergy Anaphylaxis    Latex Hives       Objective     Blood pressure 134/76, pulse 81, temperature 97 5 °F (36 4 °C), temperature source Temporal, resp  rate 18, height 5' 6" (1 676 m), weight 128 kg (282 lb 3 oz), last menstrual period 09/17/1999, SpO2 100 %, not currently breastfeeding  PHYSICAL EXAM    Gen: NAD  CV: RRR  CHEST: Clear  ABD: soft, NT/ND  EXT: no edema      ASSESSMENT/PLAN:  This is a 62y o  year old female here for colonoscopy, and she is stable and optimized for her procedure

## 2022-08-18 DIAGNOSIS — I25.10 CORONARY ARTERY DISEASE INVOLVING NATIVE CORONARY ARTERY OF NATIVE HEART WITHOUT ANGINA PECTORIS: ICD-10-CM

## 2022-08-18 RX ORDER — ASPIRIN 81 MG/1
TABLET, COATED ORAL
Qty: 90 TABLET | Refills: 1 | Status: SHIPPED | OUTPATIENT
Start: 2022-08-18

## 2022-08-30 ENCOUNTER — OFFICE VISIT (OUTPATIENT)
Dept: FAMILY MEDICINE CLINIC | Facility: CLINIC | Age: 62
End: 2022-08-30
Payer: COMMERCIAL

## 2022-08-30 VITALS
HEIGHT: 66 IN | WEIGHT: 278 LBS | HEART RATE: 72 BPM | OXYGEN SATURATION: 98 % | DIASTOLIC BLOOD PRESSURE: 74 MMHG | SYSTOLIC BLOOD PRESSURE: 118 MMHG | TEMPERATURE: 97.2 F | BODY MASS INDEX: 44.68 KG/M2

## 2022-08-30 DIAGNOSIS — E11.9 TYPE 2 DIABETES MELLITUS WITHOUT COMPLICATION, WITHOUT LONG-TERM CURRENT USE OF INSULIN (HCC): Primary | ICD-10-CM

## 2022-08-30 DIAGNOSIS — E11.69 HYPERLIPIDEMIA ASSOCIATED WITH TYPE 2 DIABETES MELLITUS (HCC): ICD-10-CM

## 2022-08-30 DIAGNOSIS — I15.2 HYPERTENSION COMPLICATING DIABETES (HCC): ICD-10-CM

## 2022-08-30 DIAGNOSIS — E11.69 TYPE 2 DIABETES MELLITUS WITH OBESITY (HCC): ICD-10-CM

## 2022-08-30 DIAGNOSIS — E66.9 TYPE 2 DIABETES MELLITUS WITH OBESITY (HCC): ICD-10-CM

## 2022-08-30 DIAGNOSIS — E03.9 HYPOTHYROIDISM, UNSPECIFIED TYPE: ICD-10-CM

## 2022-08-30 DIAGNOSIS — I25.10 CORONARY ARTERY DISEASE INVOLVING NATIVE CORONARY ARTERY OF NATIVE HEART WITHOUT ANGINA PECTORIS: ICD-10-CM

## 2022-08-30 DIAGNOSIS — E11.59 HYPERTENSION COMPLICATING DIABETES (HCC): ICD-10-CM

## 2022-08-30 DIAGNOSIS — E78.5 HYPERLIPIDEMIA ASSOCIATED WITH TYPE 2 DIABETES MELLITUS (HCC): ICD-10-CM

## 2022-08-30 LAB
LEFT EYE DIABETIC RETINOPATHY: ABNORMAL
LEFT EYE IMAGE QUALITY: ABNORMAL
LEFT EYE MACULAR EDEMA: ABNORMAL
LEFT EYE OTHER RETINOPATHY: ABNORMAL
RIGHT EYE DIABETIC RETINOPATHY: ABNORMAL
RIGHT EYE IMAGE QUALITY: ABNORMAL
RIGHT EYE MACULAR EDEMA: ABNORMAL
RIGHT EYE OTHER RETINOPATHY: ABNORMAL
SEVERITY (EYE EXAM): ABNORMAL
SL AMB POCT HEMOGLOBIN AIC: 5.9 (ref ?–6.5)

## 2022-08-30 PROCEDURE — 83036 HEMOGLOBIN GLYCOSYLATED A1C: CPT | Performed by: PHYSICIAN ASSISTANT

## 2022-08-30 PROCEDURE — 3725F SCREEN DEPRESSION PERFORMED: CPT | Performed by: PHYSICIAN ASSISTANT

## 2022-08-30 PROCEDURE — 92250 FUNDUS PHOTOGRAPHY W/I&R: CPT | Performed by: PHYSICIAN ASSISTANT

## 2022-08-30 PROCEDURE — 90471 IMMUNIZATION ADMIN: CPT | Performed by: PHYSICIAN ASSISTANT

## 2022-08-30 PROCEDURE — 3044F HG A1C LEVEL LT 7.0%: CPT | Performed by: PHYSICIAN ASSISTANT

## 2022-08-30 PROCEDURE — 99214 OFFICE O/P EST MOD 30 MIN: CPT | Performed by: PHYSICIAN ASSISTANT

## 2022-08-30 PROCEDURE — 90686 IIV4 VACC NO PRSV 0.5 ML IM: CPT | Performed by: PHYSICIAN ASSISTANT

## 2022-08-30 PROCEDURE — 2024F 7 FLD RTA PHOTO EVC RTNOPTHY: CPT | Performed by: PHYSICIAN ASSISTANT

## 2022-08-30 NOTE — PROGRESS NOTES
Assessment/Plan:       Problem List Items Addressed This Visit        Endocrine    Hypothyroidism    Relevant Orders    TSH, 3rd generation with Free T4 reflex    Hypertension complicating diabetes ([facility] )    Relevant Orders    Comprehensive metabolic panel    Type 2 diabetes mellitus without complication, without long-term current use of insulin ([facility] ) - Primary    Relevant Orders    IRIS Diabetic eye exam    POCT hemoglobin A1c (Completed)    FLUZONE: influenza vaccine, quadrivalent, 0 5 mL (Completed)    CBC and differential    Microalbumin / creatinine urine ratio    Hyperlipidemia associated with type 2 diabetes mellitus ([facility] )    Relevant Orders    Lipid Panel with Direct LDL reflex    Type 2 diabetes mellitus with obesity ([facility] )    Relevant Orders    Comprehensive metabolic panel       Cardiovascular and Mediastinum    Coronary artery disease involving native coronary artery of native heart without angina pectoris        DM well controlled, continue metformin  Check labs as above  Continue statin, ASA, BB  BP well controlled  CP not clearly anginal  Recommend continued follow up with cardiology  Check TSH, continue levothyroxine  3 month follow up, earlier prn    Subjective:      Patient ID: Tejas Gerber is a 62 y o  female  Pt presents for follow up    DM: a1c down to 5 9  on metformin  On ace, statin  HLD: due for FLP, historically well controlled on simvastatin ezitimbe  HTN: on lisinopril, coreg, /74  CAD: notes some intermittent random left sided chest pains  Hx of stenting to the LAD, non exertional CP, no SOB  On ASA, statin, coreg  Normal stress test apparently in 2020  She follows with cardiology   Hypothyroid: on levothyroxine 175mcg, due for TSH      The following portions of the patient's history were reviewed and updated as appropriate:   She  has a past medical history of Diabetes mellitus ([facility] ), Disease of thyroid gland, and Hypertension    She   Patient Active Problem List    Diagnosis Date Noted    Personal history of colonic polyps 04/14/2022    Family history of colon cancer 04/14/2022    Hyperlipidemia associated with type 2 diabetes mellitus ([place] [address] ) 10/04/2021    Type 2 diabetes mellitus with obesity ([place] [address] ) 10/04/2021    Primary osteoarthritis of both knees 10/04/2021    Coronary artery disease involving native coronary artery of native heart without angina pectoris 07/01/2021    Presence of stent in LAD coronary artery 07/01/2021    Hypothyroidism 07/01/2021    Hypertension complicating diabetes ([place] [address] ) 07/01/2021    Mixed hyperlipidemia 07/01/2021    Type 2 diabetes mellitus without complication, without long-term current use of insulin ([place] [address] ) 07/01/2021    NOLASCO (dyspnea on exertion) 07/01/2021    Class 3 severe obesity due to excess calories with serious comorbidity and body mass index (BMI) of 45 0 to 49 9 in adult [facility]) 07/01/2021    Varicose veins of both lower extremities with pain 07/01/2021    BRCA positive 07/01/2021    History of anemia 07/01/2021     She  has a past surgical history that includes Cardiac surgery  Her family history includes Breast cancer in her maternal aunt and maternal aunt; Cervical cancer in her mother; Diabetes in her daughter; Heart disease in her paternal aunt and paternal aunt; No Known Problems in her maternal grandmother and sister; Prostate cancer in her maternal grandfather  She  reports that she has never smoked  She has never used smokeless tobacco  She reports that she does not drink alcohol and does not use drugs    Current Outpatient Medications   Medication Sig Dispense Refill    Aspirin Low Dose 81 MG EC tablet TAKE 1 TABLET BY MOUTH EVERY DAY 90 tablet 1    carvedilol (COREG) 12 5 mg tablet Take 1 tablet (12 5 mg total) by mouth 2 (two) times a day 180 tablet 1    Diclofenac Sodium (VOLTAREN) 1 % Apply 2 g topically 4 (four) times a day 100 g 2    ezetimibe-simvastatin (VYTORIN) 10-40 mg per tablet Take 1 tablet by mouth daily at bedtime 90 tablet 1    levothyroxine 175 mcg tablet Take 1 tablet (175 mcg total) by mouth daily Patient takes 7 1/2 tablets by mouth per week  One daily and on Sunday 1 5 tablet 90 tablet 1    lisinopril (ZESTRIL) 20 mg tablet Take 1 tablet (20 mg total) by mouth daily 90 tablet 1    metFORMIN (GLUCOPHAGE) 1000 MG tablet Take 1 tablet (1,000 mg total) by mouth 2 (two) times a day with meals 180 tablet 1     No current facility-administered medications for this visit  Current Outpatient Medications on File Prior to Visit   Medication Sig    Aspirin Low Dose 81 MG EC tablet TAKE 1 TABLET BY MOUTH EVERY DAY    carvedilol (COREG) 12 5 mg tablet Take 1 tablet (12 5 mg total) by mouth 2 (two) times a day    Diclofenac Sodium (VOLTAREN) 1 % Apply 2 g topically 4 (four) times a day    ezetimibe-simvastatin (VYTORIN) 10-40 mg per tablet Take 1 tablet by mouth daily at bedtime    levothyroxine 175 mcg tablet Take 1 tablet (175 mcg total) by mouth daily Patient takes 7 1/2 tablets by mouth per week  One daily and on Sunday 1 5 tablet    lisinopril (ZESTRIL) 20 mg tablet Take 1 tablet (20 mg total) by mouth daily    metFORMIN (GLUCOPHAGE) 1000 MG tablet Take 1 tablet (1,000 mg total) by mouth 2 (two) times a day with meals    [DISCONTINUED] nabumetone (RELAFEN) 750 mg tablet     [DISCONTINUED] ofloxacin (FLOXIN) 0 3 % otic solution Administer 10 drops into both ears 2 (two) times a day    [DISCONTINUED] promethazine (PHENERGAN) 12 5 mg/10 mL syrup  (Patient not taking: Reported on 4/14/2022 )     No current facility-administered medications on file prior to visit  She is allergic to kiwi extract - food allergy, shrimp (diagnostic) - food allergy, shellfish-derived products - food allergy, and latex       Review of Systems   Constitutional: Negative for chills, fatigue and fever     HENT: Negative for congestion, ear pain, hearing loss, nosebleeds, postnasal drip, rhinorrhea, sinus pressure, sinus pain, sneezing and sore throat  Eyes: Negative for pain, discharge, itching and visual disturbance  Respiratory: Negative for cough, chest tightness, shortness of breath and wheezing  Cardiovascular: Negative for chest pain, palpitations and leg swelling  Gastrointestinal: Negative for abdominal pain, blood in stool, constipation, diarrhea, nausea and vomiting  Genitourinary: Negative for frequency and urgency  Neurological: Negative for dizziness, light-headedness and numbness  Objective:      /74   Pulse 72   Temp (!) 97 2 °F (36 2 °C)   Ht 5' 6" (1 676 m)   Wt 126 kg (278 lb)   LMP 09/17/1999 (Exact Date)   SpO2 98%   BMI 44 87 kg/m²          Physical Exam  Vitals and nursing note reviewed  Constitutional:       General: She is not in acute distress  Appearance: Normal appearance  HENT:      Head: Normocephalic and atraumatic  Nose: Nose normal       Mouth/Throat:      Mouth: Mucous membranes are moist       Pharynx: Oropharynx is clear  No oropharyngeal exudate or posterior oropharyngeal erythema  Eyes:      Pupils: Pupils are equal, round, and reactive to light  Cardiovascular:      Rate and Rhythm: Normal rate and regular rhythm  Pulses: no weak pulses          Dorsalis pedis pulses are 2+ on the right side and 2+ on the left side  Posterior tibial pulses are 2+ on the right side and 2+ on the left side  Heart sounds: Normal heart sounds  Pulmonary:      Effort: Pulmonary effort is normal  No respiratory distress  Breath sounds: Normal breath sounds  Abdominal:      General: Bowel sounds are normal       Palpations: Abdomen is soft  Musculoskeletal:         General: Normal range of motion  Cervical back: Normal range of motion and neck supple  Right lower leg: Edema present  Left lower leg: Edema present  Feet:      Right foot:      Skin integrity: No ulcer, skin breakdown, erythema, warmth, callus or dry skin        Left foot:      Skin integrity: No ulcer, skin breakdown, erythema, warmth, callus or dry skin  Skin:     General: Skin is warm and dry  Neurological:      Mental Status: She is alert and oriented to person, place, and time  Psychiatric:         Mood and Affect: Mood and affect normal            Diabetic Foot Exam    Patient's shoes and socks removed  Right Foot/Ankle   Right Foot Inspection  Skin Exam: skin normal and skin intact  No dry skin, no warmth, no callus, no erythema, no maceration, no abnormal color, no pre-ulcer, no ulcer and no callus  Toe Exam: ROM and strength within normal limits  Sensory   Vibration: intact  Proprioception: intact  Monofilament testing: intact    Vascular  Capillary refills: < 3 seconds  The right DP pulse is 2+  The right PT pulse is 2+  Left Foot/Ankle  Left Foot Inspection  Skin Exam: skin normal and skin intact  No dry skin, no warmth, no erythema, no maceration, normal color, no pre-ulcer, no ulcer and no callus  Toe Exam: ROM and strength within normal limits  Sensory   Vibration: intact  Proprioception: intact  Monofilament testing: intact    Vascular  Capillary refills: < 3 seconds  The left DP pulse is 2+  The left PT pulse is 2+       Assign Risk Category  No deformity present  No loss of protective sensation  No weak pulses  Risk: 0

## 2022-09-05 PROBLEM — Z95.5 STATUS POST CORONARY ARTERY STENT PLACEMENT: Status: ACTIVE | Noted: 2020-01-22

## 2022-09-05 PROBLEM — Z99.89 AMBULATES WITH CANE: Status: ACTIVE | Noted: 2019-08-21

## 2022-09-05 PROBLEM — D69.6 THROMBOCYTOPENIA (HCC): Status: ACTIVE | Noted: 2018-09-26

## 2022-09-05 PROBLEM — E89.0 HYPOTHYROIDISM FOLLOWING RADIOIODINE THERAPY: Status: ACTIVE | Noted: 2018-04-03

## 2022-09-05 NOTE — PROGRESS NOTES
Diagnoses and all orders for this visit:    Encounter for gynecological examination without abnormal finding    BRCA gene mutation positive  -     Ambulatory Referral to Breast Surgery; Future    Encounter for screening mammogram for malignant neoplasm of breast  -     Mammo screening bilateral w 3d & cad; Future        Advised to call with any Post Menopausal bleeding  Calcium/ Vit D dietary requirements discussed,   Weight bearing exercises minium of 150 mins/weekly advised  Kegel exercises advised   Reviewed perineal hygiene and vaginal dryness post menopause  SBE and yearly mammography advised  ASCCP guidelines reviewed  Will discontinue PAP's according to recommendations  Condoms encouraged with all sexual activity to prevent STI's  Health maintenance encouraged with PMD, remain current with Colonoscopy, Dexa scan, and recommended vaccines  Advised to call with any issues, all concerns & questions addressed  See after visit summary for further information    F/U annually or Biannual if Medicare       Health Maintenance:    Last PAP: 09/01/2021 Neg/Neg   Next PAP Due:    Last Mammogram:11/02/2021  Life time Ganga [place] % 10 32, Density A almost entirely fatty, Bi-Rads 2 Benign  Next Mammogram: Order given    Last Colonoscopy:06/20/2022 RTO 5 y    Last DEXA: Not on file    Pleasant 62 y o  , female [id number]  postmenopausal x 3-5 years here for annual exam    GYN hx includes: fibroids, heavy menses, D&C, pt is BRCA positive ( unsure of 1 or 2),pt reports that she was told she should have more frequent evaluations but her insurance will not cover  Referral placed to breast surgeon for further evaluation and recommendations  Patient has a copy of her genetic results and will take it with her  Family Hx: family hx of breast CA 2 maternal aunts and cervical CA in her mother,  insurance denied this  She reports no new changes in her health   Medically stable     Denies history of abnormal pap smears  Denies abnormal Mammograms   Denies postmenopausal bleeding or issues with vasomotor symptoms  Denies vaginal issues  Denies pelvic pain   Denies dyspareunia   Reports symptoms of pelvic organ prolapse, urinary, or fecal incontinence  With sneeze, cough, laugh , we reviewed Kegel's at length patient advised instructions are provided in our after visit summary  is not sexually active  Denies STI concerns  declines STD testing   Denies intimate partner violence    Past Medical History:   Diagnosis Date    Diabetes mellitus ([address] )     Disease of thyroid gland     Hypertension      Past Surgical History:   Procedure Laterality Date    CARDIAC SURGERY        Family History   Problem Relation Age of Onset    Cervical cancer Mother         hysterectomy    Breast cancer Maternal Aunt         masectomy    Breast cancer Maternal Aunt         found lump in nipple    No Known Problems Sister     Diabetes Daughter     No Known Problems Maternal Grandmother     Prostate cancer Maternal Grandfather     Heart disease Paternal Aunt     Heart disease Paternal Aunt      Social History     Tobacco Use    Smoking status: Never Smoker    Smokeless tobacco: Never Used   Vaping Use    Vaping Use: Never used   Substance Use Topics    Alcohol use: Never    Drug use: Never       Current Outpatient Medications:     Aspirin Low Dose 81 MG EC tablet, TAKE 1 TABLET BY MOUTH EVERY DAY, Disp: 90 tablet, Rfl: 1    carvedilol (COREG) 12 5 mg tablet, Take 1 tablet (12 5 mg total) by mouth 2 (two) times a day, Disp: 180 tablet, Rfl: 1    Diclofenac Sodium (VOLTAREN) 1 %, Apply 2 g topically 4 (four) times a day, Disp: 100 g, Rfl: 2    ezetimibe-simvastatin (VYTORIN) 10-40 mg per tablet, Take 1 tablet by mouth daily at bedtime, Disp: 90 tablet, Rfl: 1    levothyroxine 175 mcg tablet, Take 1 tablet (175 mcg total) by mouth daily Patient takes 7 1/2 tablets by mouth per week    One daily and on Sunday 1 5 tablet, Disp: 90 tablet, Rfl: 1    lisinopril (ZESTRIL) 20 mg tablet, Take 1 tablet (20 mg total) by mouth daily, Disp: 90 tablet, Rfl: 1    metFORMIN (GLUCOPHAGE) 1000 MG tablet, Take 1 tablet (1,000 mg total) by mouth 2 (two) times a day with meals, Disp: 180 tablet, Rfl: 1  Patient Active Problem List    Diagnosis Date Noted    Personal history of colonic polyps 2022    Family history of colon cancer 2022    Hyperlipidemia associated with type 2 diabetes mellitus ([facility] ) 10/04/2021    Type 2 diabetes mellitus with obesity ([place] [address] ) 10/04/2021    Primary osteoarthritis of both knees 10/04/2021    Coronary artery disease involving native coronary artery of native heart without angina pectoris 2021    Presence of stent in LAD coronary artery 2021    Hypothyroidism 2021    Hypertension complicating diabetes ([place] [address] ) 2021    Mixed hyperlipidemia 2021    Type 2 diabetes mellitus without complication, without long-term current use of insulin ([place] [address] ) 2021    NOLASCO (dyspnea on exertion) 2021    Class 3 severe obesity due to excess calories with serious comorbidity and body mass index (BMI) of 45 0 to 49 9 in adult [facility]) 2021    Varicose veins of both lower extremities with pain 2021    BRCA positive 2021    History of anemia 2021    Status post coronary artery stent placement 2020    Ambulates with cane 2019    [facility] 2018    Hypothyroidism following radioiodine therapy 2018    Uterine leiomyoma 12/15/2015       Allergies   Allergen Reactions    Kiwi Extract - Food Allergy Anaphylaxis    Shrimp (Diagnostic) - Food Allergy Anaphylaxis    Shellfish-Derived Products - Food Allergy Rash     All Seafood     Latex Hives       OB History    Para Term  AB Living   3 3 3     3   SAB IAB Ectopic Multiple Live Births                  # Outcome Date GA Lbr Raghav/2nd Weight Sex Delivery Anes PTL Lv   3 Term 2 Term            1 Term                Vitals:    09/07/22 1011   BP: 124/80   BP Location: Left arm   Patient Position: Sitting   Cuff Size: Large   Weight: 127 kg (280 lb)   Height: 5' 6" (1 676 m)     Body mass index is 45 19 kg/m²  Review of Systems     Constitutional: Negative for chills, fatigue, fever, headaches, visual disturbances, and unexpected weight change  Respiratory: Negative for cough, & shortness of breath  Cardiovascular: Negative for chest pain       Gastrointestinal: Negative for Abd pain, nausea & vomiting, constipation and diarrhea  Genitourinary: Negative for difficulty urinating, dysuria, hematuria, , unusual vaginal bleeding or discharge  dyspareunia  Skin: Negative skin changes    Physical Exam     Constitutional: Alert & Oriented x3, well-developed and well-nourished  No distress  HENT: Atraumatic, Normocephalic, Conjunctivae clear  Neck: Normal range of motion  Neck supple  No thyromegaly, mass, nodules or tenderness  Pulmonary: Effort normal  Lungs clear to ascultation bilateral  Cardiac: RRR, no murmur   Abdominal: Soft  No tenderness or masses  Musculoskeletal: Normal ROM  Skin: Warm & Dry  Psychological: Normal mood, thought content, behavior & judgement     Breasts:   Right: tissue soft without masses, tenderness, skin changes or nipple discharge  No areas of erythema or pain  No subclavicular, axillary, pectoral adenopathy  Left:  tissue soft without masses, tenderness, skin changes or nipple discharge  No areas of erythema or pain  No subclavicular, axillary, pectoral adenopathy    Pelvic exam was performed with patient supine, lithotomy position  Exam is consistent with  atrophic changes  Vulva/ Vestibule: Right: Negative rash, tenderness, lesion or injury                               Left: Negative rash, tenderness, lesion or injury   Urethral meatus: Negative for  tenderness, inflammation or discharge     Uterus: not deviated, enlarged, fixed or tender  Cervix: No CMT, no discharge or friability  Right adnexa: no mass, no tenderness and no fullness  Left adnexa: no mass, no tenderness and no fullness  Vagina: Consistent with  atrophic changes  No erythema, tenderness, masses, or foreign body in the vagina  No signs of injury around the vagina  No unusual vaginal discharge   Perineum without lesions, signs of injury, erythema or swelling  Inguinal Canal:        Right: No inguinal adenopathy or hernia present  Left: No inguinal adenopathy or hernia present       OBGyn Exam

## 2022-09-05 NOTE — PATIENT INSTRUCTIONS
Breast Self Exam for Women   AMBULATORY CARE:   A breast self-exam (BSE)  is a way to check your breasts for lumps and other changes  Regular BSEs can help you know how your breasts normally look and feel  Most breast lumps or changes are not cancer, but you should always have them checked by a healthcare provider  Why you should do a BSE:  Breast cancer is the most common type of cancer in women  Even if you have mammograms, you may still want to do a BSE regularly  If you know how your breasts normally feel and look, it may help you know when to contact your healthcare provider  Mammograms can miss some cancers  You may find a lump during a BSE that did not show up on a mammogram   When you should do a BSE:  If you have periods, you may want to do your BSE 1 week after your period ends  This is the time when your breasts may be the least swollen, lumpy, or tender  You can do regular BSEs even if you are breastfeeding or have breast implants  Call your doctor if:   You find any lumps or changes in your breasts  You have breast pain or fluid coming from your nipples  You have questions or concerns about your condition or care  How to do a BSE:       Look at your breasts in a mirror  Look at the size and shape of each breast and nipple  Check for swelling, lumps, dimpling, scaly skin, or other skin changes  Look for nipple changes, such as a nipple that is painful or beginning to pull inward  Gently squeeze both nipples and check to see if fluid (that is not breast milk) comes out of them  If you find any of these or other breast changes, contact your healthcare provider  Check your breasts while you sit or  the following 3 positions:    [place] your arms down at your sides  Raise your hands and join them behind your head  Put firm pressure with your hands on your hips  Bend slightly forward while you look at your breasts in the mirror  Lie down and feel your breasts    When you lie down, your breast tissue spreads out evenly over your chest  This makes it easier for you to feel for lumps and anything that may not be normal for your breasts  Do a BSE on one breast at a time  Place a small pillow or towel under your left shoulder  Put your left arm behind your head  Use the 3 middle fingers of your right hand  Use your fingertip pads, on the top of your fingers  Your fingertip pad is the most sensitive part of your finger  Use small circles to feel your breast tissue  Use your fingertip pads to make dime-sized, overlapping circles on your breast and armpits  Use light, medium, and firm pressure  First, press lightly  Second, press with medium pressure to feel a little deeper into the breast  Last, use firm pressure to feel deep within your breast     Examine your entire breast area  Examine the breast area from above the breast to below the breast where you feel only ribs  Make small circles with your fingertips, starting in the middle of your armpit  Make circles going up and down the breast area  Continue toward your breast and all the way across it  Examine the area from your armpit all the way over to the middle of your chest (breastbone)  Stop at the middle of your chest     Move the pillow or towel to your right shoulder, and put your right arm behind your head  Use the 3 fingertip pads of your left hand, and repeat the above steps to do a BSE on your right breast     What else you can do to check for breast problems or cancer:  Talk to your healthcare provider about mammograms  A mammogram is an x-ray of your breasts to screen for breast cancer or other problems  Your provider can tell you the benefits and risks of mammograms  The first mammogram is usually at age 39 or 48  Your provider may recommend you start at 36 or younger if your risk for breast cancer is high  Mammograms usually continue every 1 to 2 years until age 76         Follow up with your doctor as directed:  Write down your questions so you remember to ask them during your visits  © Copyright [employer] 2022 Information is for End User's use only and may not be sold, redistributed or otherwise used for commercial purposes  All illustrations and images included in CareNotes® are the copyrighted property of A D A M [employer] Inc  or Shanon Boston  The above information is an  only  It is not intended as medical advice for individual conditions or treatments  Talk to your doctor, nurse or pharmacist before following any medical regimen to see if it is safe and effective for you  Wellness Visit for Adults   AMBULATORY CARE:   A wellness visit  is when you see your healthcare provider to get screened for health problems  Your healthcare provider will also give you advice on how to stay healthy  Write down your questions so you remember to ask them  Ask your healthcare provider how often you should have a wellness visit  What happens at a wellness visit:  Your healthcare provider will ask about your health, and your family history of health problems  This includes high blood pressure, heart disease, and cancer  He or she will ask if you have symptoms that concern you, if you smoke, and about your mood  You may also be asked about your intake of medicines, supplements, food, and alcohol  Any of the following may be done: Your weight  will be checked  Your height may also be checked so your body mass index (BMI) can be calculated  Your BMI shows if you are at a healthy weight  Your blood pressure  and heart rate will be checked  Your temperature may also be checked  Blood and urine tests  may be done  Blood tests may be done to check your cholesterol levels  Abnormal cholesterol levels increase your risk for heart disease and stroke  You may also need a blood or urine test to check for diabetes if you are at increased risk  Urine tests may be done to look for signs of an infection or kidney disease      A physical exam  includes checking your heartbeat and lungs with a stethoscope  Your healthcare provider may also check your skin to look for sun damage  Screening tests  may be recommended  A screening test is done to check for diseases that may not cause symptoms  The screening tests you may need depend on your age, gender, family history, and lifestyle habits  For example, colorectal screening may be recommended if you are 48years old or older  Screening tests you need if you are a woman:   A Pap smear  is used to screen for cervical cancer  Pap smears are usually done every 3 to 5 years depending on your age  You may need them more often if you have had abnormal Pap smear test results in the past  Ask your healthcare provider how often you should have a Pap smear  A mammogram  is an x-ray of your breasts to screen for breast cancer  Experts recommend mammograms every 2 years starting at age 48 years  You may need a mammogram at age 52 years or younger if you have an increased risk for breast cancer  Talk to your healthcare provider about when you should start having mammograms and how often you need them  Vaccines you may need:   Get an influenza vaccine  every year  The influenza vaccine protects you from the flu  Several types of viruses cause the flu  The viruses change over time, so new vaccines are made each year  Get a tetanus-diphtheria (Td) booster vaccine  every 10 years  This vaccine protects you against tetanus and diphtheria  Tetanus is a severe infection that may cause painful muscle spasms and lockjaw  Diphtheria is a severe bacterial infection that causes a thick covering in the back of your mouth and throat  Get a human papillomavirus (HPV) vaccine  if you are female and aged 23 to 32 or male 23 to 24 and never received it  This vaccine protects you from HPV infection  HPV is the most common infection spread by sexual contact   HPV may also cause vaginal, penile, and anal cancers  Get a pneumococcal vaccine  if you are aged 72 years or older  The pneumococcal vaccine is an injection given to protect you from pneumococcal disease  Pneumococcal disease is an infection caused by pneumococcal bacteria  The infection may cause pneumonia, meningitis, or an ear infection  Get a shingles vaccine  if you are 60 or older, even if you have had shingles before  The shingles vaccine is an injection to protect you from the varicella-zoster virus  This is the same virus that causes chickenpox  Shingles is a painful rash that develops in people who had chickenpox or have been exposed to the virus  How to eat healthy:  My Plate is a model for planning healthy meals  It shows the types and amounts of foods that should go on your plate  Fruits and vegetables make up about half of your plate, and grains and protein make up the other half  A serving of dairy is included on the side of your plate  The amount of calories and serving sizes you need depends on your age, gender, weight, and height  Examples of healthy foods are listed below:  Eat a variety of vegetables  such as dark green, red, and orange vegetables  You can also include canned vegetables low in sodium (salt) and frozen vegetables without added butter or sauces  Eat a variety of fresh fruits , canned fruit in 100% juice, frozen fruit, and dried fruit  Include whole grains  At least half of the grains you eat should be whole grains  Examples include whole-wheat bread, wheat pasta, brown rice, and whole-grain cereals such as oatmeal     Eat a variety of protein foods such as seafood (fish and shellfish), lean meat, and poultry without skin (turkey and chicken)  Examples of lean meats include pork leg, shoulder, or tenderloin, and beef round, sirloin, tenderloin, and extra lean ground beef  Other protein foods include eggs and egg substitutes, beans, peas, soy products, nuts, and seeds      Choose low-fat dairy products such as skim or 1% milk or low-fat yogurt, cheese, and cottage cheese  Limit unhealthy fats  such as butter, hard margarine, and shortening  Exercise:  Exercise at least 30 minutes per day on most days of the week  Some examples of exercise include walking, biking, dancing, and swimming  You can also fit in more physical activity by taking the stairs instead of the elevator or parking farther away from stores  Include muscle strengthening activities 2 days each week  Regular exercise provides many health benefits  It helps you manage your weight, and decreases your risk for type 2 diabetes, heart disease, stroke, and high blood pressure  Exercise can also help improve your mood  Ask your healthcare provider about the best exercise plan for you  General health and safety guidelines:   Do not smoke  Nicotine and other chemicals in cigarettes and cigars can cause lung damage  Ask your healthcare provider for information if you currently smoke and need help to quit  E-cigarettes or smokeless tobacco still contain nicotine  Talk to your healthcare provider before you use these products  Limit alcohol  A drink of alcohol is 12 ounces of beer, 5 ounces of wine, or 1½ ounces of liquor  Lose weight, if needed  Being overweight increases your risk of certain health conditions  These include heart disease, high blood pressure, type 2 diabetes, and certain types of cancer  Protect your skin  Do not sunbathe or use tanning beds  Use sunscreen with a SPF 15 or higher  Apply sunscreen at least 15 minutes before you go outside  Reapply sunscreen every 2 hours  Wear protective clothing, hats, and sunglasses when you are outside  Drive safely  Always wear your seatbelt  Make sure everyone in your car wears a seatbelt  A seatbelt can save your life if you are in an accident  Do not use your cell phone when you are driving  This could distract you and cause an accident   Pull over if you need to make a call or send a text message  Practice safe sex  Use latex condoms if are sexually active and have more than one partner  Your healthcare provider may recommend screening tests for sexually transmitted infections (STIs)  Wear helmets, lifejackets, and protective gear  Always wear a helmet when you ride a bike or motorcycle, go skiing, or play sports that could cause a head injury  Wear protective equipment when you play sports  Wear a lifejacket when you are on a boat or doing water sports  © Copyright [employer] 2022 Information is for End User's use only and may not be sold, redistributed or otherwise used for commercial purposes  All illustrations and images included in CareNotes® are the copyrighted property of A D A M , Inc  or [facility] Scott Travis   The above information is an  only  It is not intended as medical advice for individual conditions or treatments  Talk to your doctor, nurse or pharmacist before following any medical regimen to see if it is safe and effective for you  Perineal Hygiene     No soaps or feminine wash to the vulva  Use only water to cleanse, or water with Dove or [employer] if necessary  No lotion to the area  Use only coconut oil for moisture if needed   No douching     [place] underware, loose fitting clothing  Only perfume-free, dye-free laundry detergent, use a second rinse cycle   Avoid fabric softeners/dryer sheets  Coconut oil as a lubricant (if not using condoms) or another scent-free lubricant (Astroglide, Uberlube) if needed  Partner to avoid the same products as well  Over the counter probiotic to restore vaginal willy may be helpful as well     You may also look into Boric Acid vaginal suppositories to restore vaginal PH balance for up to 2 weeks as directed on the box  You may not use these if you are pregnant Kegel Exercises for Women   AMBULATORY CARE:   Kegel exercises  help strengthen your pelvic muscles   Pelvic muscles hold your pelvic organs, such as your bladder and uterus, in place  Kegel exercises help prevent or control problems with urine incontinence (leakage)  Incontinence may be caused by pregnancy, childbirth, or menopause  Contact your healthcare provider if:   You cannot feel your muscles tighten or relax  You continue to leak urine  You have questions or concerns about your condition or care  Use the correct muscles:  Pelvic muscles are the muscles you use to control urine flow  To target these muscles, stop and start the flow of urine several times  This will help you become familiar with how it feels to tighten and relax these muscles  How to do Kegel exercises:   Empty your bladder  You may lie down, stand up, or sit down to do these exercises  When you first try to do these exercises, it may be easier if you lie down  Tighten or squeeze your pelvic muscles slowly  It may feel like you are trying to hold back urine or gas  Hold this position for 3 seconds  Relax for 3 seconds  Repeat this cycle 10 times  Do 10 sets of Kegel exercises, at least 3 times a day  Do not hold your breath when you do Kegel exercises  Keep your stomach, back, and leg muscles relaxed  As your muscles get stronger, you will be able to hold the squeeze longer  Your healthcare provider may ask that you increase your pelvic muscle squeeze to 10 seconds  After you squeeze for 10 seconds, relax for 10 seconds  What else you should know:   Once you know how to do Kegel exercises, use different positions  You can do these exercises while you lie on the floor, sit at your desk or watch TV, and while you stand  You may notice improved bladder control within about 6 weeks  Tighten your pelvic muscles before you sneeze, cough, or lift to prevent urine leakage  Follow up with your doctor as directed:  Write down your questions so you remember to ask them during your visits     © Copyright [employer] 2022 Information is for End User's use only and may not be sold, redistributed or otherwise used for commercial purposes  All illustrations and images included in CareNotes® are the copyrighted property of [employer] A M [employer] Inc  or Shanon [place]  The above information is an  only  It is not intended as medical advice for individual conditions or treatments  Talk to your doctor, nurse or pharmacist before following any medical regimen to see if it is safe and effective for you  Menopause   WHAT YOU NEED TO KNOW:   What is menopause? Menopause is a normal stage in a woman's life when her monthly periods stop  You are considered to be in menopause when you have not had a period for a full year after the age of 36  Menopause usually occurs between ages 52 to 48  Perimenopause is a stage before menopause that may cause signs and symptoms similar to menopause  Perimenopause may start about 4 years before menopause  What causes menopause? Menopause starts when the ovaries stop making the female hormones estrogen and progesterone  After menopause, you are no longer able to become pregnant  Any of the following may trigger menopause or early menopause:  Surgery, including a hysterectomy or oophorectomy    Family history of early menopause    Smoking    Chemotherapy or pelvic radiation    Chromosome abnormalities, including Aguero syndrome and Fragile X syndrome    Premature ovarian insufficiency (the ovaries stop producing eggs before age 36)    What are the signs and symptoms of menopause?   You may have any of the following:  Irregular menstrual cycles with heavy vaginal bleeding followed by decreased bleeding until it stops    Hot flashes (feeling warm, flushed, and sweaty)    Vaginal changes such as increased dryness    Mood changes such as anxiety, depression, or decreased desire to have sex    Trouble sleeping, joint pain, headaches    Brittle nails, hair on chin or chest where it is normally absent    Decrease in breast size and change in skin texture    Weight gain    How is menopause treated or managed? Hormone replacement therapy (HRT) is medicine that replaces your low hormone levels  HRT contains estrogen and sometimes progestin  HRT has several benefits  HRT helps prevent osteoporosis, which decreases your risk for bone fractures  HRT also protects you from colorectal cancer  HRT also has some risks  HRT increases your risk for breast cancer, blood clots, heart disease, a heart attack, or a stroke  If you are 72 years or older, HRT can also increase your risk for dementia  Your risk for uterine or endometrial cancer is higher if you take estrogen-only HRT  Manage hot flashes  Hot flashes are brief periods of feeling very warm, flushed, and sweaty  Hot flashes can last from a few seconds to several minutes  They may happen many times during the day, and are common at night  Layer your clothing so that you can easily remove some clothing and cool yourself during a hot flash  Cold drinks may also be helpful  Non-hormone medicines can help relieve or prevent hot flashes  Examples include certain antidepressants, nerve medicines, and high blood pressure medicines  Reduce vaginal dryness by using over-the-counter vaginal creams  Vaginal dryness may cause you to have pain or discomfort during sex  Only use creams that are made for vaginal use  Do  not  use petroleum jelly  You may put an estrogen cream in and around your vagina  Estrogen cream may help decrease vaginal dryness and lower your risk of vaginal infections  Continue to use birth control during perimenopause if you do not want to get pregnant  You may need to use birth control until it has been 1 year since your periods stopped  Ask your healthcare provider when you can stop using birth control to prevent pregnancy  How can I live a healthy lifestyle during and after menopause?   After menopause, your risk for heart disease and bone loss increases  Ask about these and other ways to stay healthy:  Exercise regularly  Exercise helps you maintain a healthy weight  Exercise can also help to control your blood pressure and cholesterol levels  Include weight-bearing exercise for strong bones  Weight bearing exercise is recommended for at least 30 minutes, 3 times a week  Ask your healthcare provider about the best exercise plan for you  Eat a variety of healthy foods  Include fruits, vegetables, whole grains (whole-wheat bread, pasta, and cereals), low-fat dairy, and lean protein foods (beans, poultry, and fish)  Limit foods high in sodium (salt)  Ask your healthcare provider for more information about a meal plan that is right for you  Do not smoke  If you smoke, it is never too late to quit  You are more likely to have a heart attack, lung disease, blood clots, and cancer if you smoke  Ask your healthcare provider for information if you need help quitting  Take supplements as directed  You may need extra calcium and vitamin D to help prevent osteoporosis  Limit alcohol and caffeine  Alcohol and caffeine may worsen your symptoms  When should I call my doctor? You have vaginal bleeding after menopause  You have questions or concerns about your condition or care  CARE AGREEMENT:   You have the right to help plan your care  Learn about your health condition and how it may be treated  Discuss treatment options with your healthcare providers to decide what care you want to receive  You always have the right to refuse treatment  The above information is an  only  It is not intended as medical advice for individual conditions or treatments  Talk to your doctor, nurse or pharmacist before following any medical regimen to see if it is safe and effective for you    © Copyright [employer] 2022 Information is for End User's use only and may not be sold, redistributed or otherwise used for commercial purposes  All illustrations and images included in CareNotes® are the copyrighted property of [employer]  or [facility] Scott [place]  For vaginal dryness: You may use:     Coconut oil (organic, pure, unscented) as needed for moisture or lubrication  ( Do not use if allergic)       Replens moisture restore external comfort gel daily ( use as directed on the box)        Replens long lasting vaginal moisturizer  ( use as directed on the box)       For Vaginal Lubrication:        You may use:     Coconut oil (organic, pure, unscented) as needed for lubrication during intercourse  (Do not use if allergic)               Replens silky smooth lubricant, premium silicone based lubricant for intercourse  ( use as directed, a small amount will provide an enhanced natural feeling)     Any premium over the counter vaginal lubricant water or silicone based  Silicone based will have more staying power  For Vulvar hygiene:     No soaps or feminine wash to the vulva with the exception of Dove or Dove Sensitive Skin bar soap if necessary  Only perfume-free, dye-free laundry detergent, use a second rinse cycle  Avoid fabric softeners/dryer sheets  No lotion to the area  No Douching   Coconut oil as a lubricant (if not using condoms) or another scent-free premium lubricant  Loose fitting cotton underwear and loose fitting outer clothing   Partner to avoid the same products as well  Over the counter probiotic taken orally may help to restore vaginal willy  For vaginal dryness: You may use:     Coconut oil (organic, pure, unscented) as needed for moisture or lubrication   ( Do not use if allergic)       Replens moisture restore external comfort gel daily ( use as directed on the box)        Replens long lasting vaginal moisturizer  ( use as directed on the box)       For Vaginal Lubrication:        You may use:     Coconut oil (organic, pure, unscented) as needed for lubrication during intercourse  (Do not use if allergic)               Replens silky smooth lubricant, premium silicone based lubricant for intercourse  ( use as directed, a small amount will provide an enhanced natural feeling)     Any premium over the counter vaginal lubricant water or silicone based  Silicone based will have more staying power  For Vulvar hygiene:     No soaps or feminine wash to the vulva with the exception of Dove or Dove Sensitive Skin bar soap if necessary  Only perfume-free, dye-free laundry detergent, use a second rinse cycle  Avoid fabric softeners/dryer sheets  No lotion to the area  No Douching   Coconut oil as a lubricant (if not using condoms) or another scent-free premium lubricant  Loose fitting cotton underwear and loose fitting outer clothing   Partner to avoid the same products as well  Over the counter probiotic taken orally may help to restore vaginal willy  Vaginal Atrophy   AMBULATORY CARE:   Vaginal atrophy  is a condition that causes thinning, drying, and inflammation of vaginal tissue  This condition is caused by decreased levels of estrogen (a female sex hormone)  Vaginal atrophy can increase your risk for vaginal and urinary tract infections  Vaginal atrophy can worsen over time if not treated  Common signs and symptoms include the following:   Vaginal dryness, itching, and burning    Vaginal discharge    Pain or discomfort during sex    Light bleeding after sex    Burning during urination    Frequent, sudden, strong urges to urinate    Urinary incontinence (loss of control of your bladder)    Contact your healthcare provider if:   You have a foul-smelling odor coming from your vagina  You have a thick, cheese-like discharge from your vagina  You have itching, swelling, or redness in your vagina  You have pain or burning when you urinate  Your urine smells bad  Your symptoms do not improve, or they get worse       You have questions or concerns about your condition or care  Treatment:   Over-the counter vaginal moisturizers  can help reduce dryness  Your healthcare provider may recommend that you use a vaginal moisturizer several times each week and during sex  Only use creams that are made for vaginal use  Do  not  use petroleum jelly  Lubricants can be used during sex to decrease pain and discomfort  Estrogen  may help decrease dryness  It may also lower your risk of vaginal infections if you are going through menopause  It can also help to relieve urinary symptoms  Estrogen may be prescribed in the form of a cream, tablet, or ring  These medicines can be applied or inserted into the vagina  Estrogen can also be prescribed in the form of a pill  Follow up with your doctor as directed:  Write down your questions so you remember to ask them during your visits  © Copyright [facility] 2022 Information is for End User's use only and may not be sold, redistributed or otherwise used for commercial purposes  All illustrations and images included in CareNotes® are the copyrighted property of A D A M , Inc  or [facility]  The above information is an  only  It is not intended as medical advice for individual conditions or treatments  Talk to your doctor, nurse or pharmacist before following any medical regimen to see if it is safe and effective for you  Urinary Incontinence   WHAT YOU NEED TO KNOW:   Urinary incontinence (UI) is when you lose control of your bladder  UI develops because your bladder cannot store or empty urine properly  The 3 most common types of UI are stress incontinence, urge incontinence, or both  DISCHARGE INSTRUCTIONS:   Call your doctor if:   You have severe pain  You are confused or cannot think clearly  You have a fever  You see blood in your urine  You have pain when you urinate  You have new or worse pain, even after treatment      Your mouth feels dry or you have vision changes  Your urine is cloudy or smells bad  You have questions or concerns about your condition or care  Medicines:   Medicines  may be given to help strengthen your bladder control  Take your medicine as directed  Contact your healthcare provider if you think your medicine is not helping or if you have side effects  Tell him or her if you are allergic to any medicine  Keep a list of the medicines, vitamins, and herbs you take  Include the amounts, and when and why you take them  Bring the list or the pill bottles to follow-up visits  Carry your medicine list with you in case of an emergency  Do pelvic muscle exercises often:  Your pelvic muscles help you stop urinating  Squeeze these muscles tight for 5 seconds, then relax for 5 seconds  Gradually work up to squeezing for 10 seconds  Do 3 sets of 15 repetitions a day, or as directed  This will help strengthen your pelvic muscles and improve bladder control  Train your bladder:  Go to the bathroom at set times, such as every 2 hours, even if you do not feel the urge to go  You can also try to hold your urine when you feel the urge to go  For example, hold your urine for 5 minutes when you feel the urge to go  As that becomes easier, hold your urine for 10 minutes  Self-care:   Keep a UI record  Write down how often you leak urine and how much you leak  Make a note of what you were doing when you leaked urine  Drink liquids as directed  Ask your healthcare provider how much liquid to drink each day and which liquids are best for you  You may need to limit the amount of liquid you drink to help control your urine leakage  Do not drink any liquid right before you go to bed  Limit or do not have drinks that contain caffeine or alcohol  Prevent constipation  Eat a variety of high-fiber foods  Good examples are high-fiber cereals, beans, vegetables, and whole-grain breads  Prune juice may help make your bowel movement softer   Walking is the best way to trigger your intestines to have a bowel movement  Exercise regularly and maintain a healthy weight  Ask your healthcare provider how much you should weigh and about the best exercise plan for you  Weight loss and exercise will decrease pressure on your bladder and help you control your leakage  Ask him or her to help you create a weight loss plan if you are overweight  Use a catheter as directed  to help empty your bladder  A catheter is a tiny, plastic tube that is put into your bladder to drain your urine  Your healthcare provider may tell you to use a catheter to prevent your bladder from getting too full and leaking urine  Go to behavior therapy as directed  Behavior therapy may be used to help you learn to control your urge to urinate  Follow up with your doctor as directed:  Write down your questions so you remember to ask them during your visits  © Copyright [employer] 2022 Information is for End User's use only and may not be sold, redistributed or otherwise used for commercial purposes  All illustrations and images included in CareNotes® are the copyrighted property of A D A M , Inc  or [facility]   The above information is an  only  It is not intended as medical advice for individual conditions or treatments  Talk to your doctor, nurse or pharmacist before following any medical regimen to see if it is safe and effective for you

## 2022-09-06 NOTE — PROGRESS NOTES
[id number] Vaginal   LMP:  Last menses was on 9/17/1999  PMB: NO  SA: NO  HPV:  Birth control:   Last pap: 09/01/2021  Last mammo: 11/02/2021:  Last colonoscopy: 06/20/2022  Last Dexa: Not on file  Family History: 2 Maternal with Breast cancer

## 2022-09-07 ENCOUNTER — ANNUAL EXAM (OUTPATIENT)
Dept: OBGYN CLINIC | Facility: CLINIC | Age: 62
End: 2022-09-07
Payer: COMMERCIAL

## 2022-09-07 VITALS
SYSTOLIC BLOOD PRESSURE: 124 MMHG | DIASTOLIC BLOOD PRESSURE: 80 MMHG | BODY MASS INDEX: 45 KG/M2 | WEIGHT: 280 LBS | HEIGHT: 66 IN

## 2022-09-07 DIAGNOSIS — Z15.01 BRCA GENE MUTATION POSITIVE: ICD-10-CM

## 2022-09-07 DIAGNOSIS — Z12.31 ENCOUNTER FOR SCREENING MAMMOGRAM FOR MALIGNANT NEOPLASM OF BREAST: ICD-10-CM

## 2022-09-07 DIAGNOSIS — Z15.09 BRCA GENE MUTATION POSITIVE: ICD-10-CM

## 2022-09-07 DIAGNOSIS — Z01.419 ENCOUNTER FOR GYNECOLOGICAL EXAMINATION WITHOUT ABNORMAL FINDING: Primary | ICD-10-CM

## 2022-09-07 PROCEDURE — 0503F POSTPARTUM CARE VISIT: CPT | Performed by: OBSTETRICS & GYNECOLOGY

## 2022-09-07 PROCEDURE — 99396 PREV VISIT EST AGE 40-64: CPT | Performed by: OBSTETRICS & GYNECOLOGY

## 2022-09-10 LAB
ALBUMIN SERPL-MCNC: 4 G/DL (ref 3.6–5.1)
ALBUMIN/CREAT UR: 7 MCG/MG CREAT
ALBUMIN/GLOB SERPL: 1.5 (CALC) (ref 1–2.5)
ALP SERPL-CCNC: 85 U/L (ref 37–153)
ALT SERPL-CCNC: 22 U/L (ref 6–29)
AST SERPL-CCNC: 18 U/L (ref 10–35)
BASOPHILS # BLD AUTO: 29 CELLS/UL (ref 0–200)
BASOPHILS NFR BLD AUTO: 0.5 %
BILIRUB SERPL-MCNC: 0.3 MG/DL (ref 0.2–1.2)
BUN SERPL-MCNC: 16 MG/DL (ref 7–25)
BUN/CREAT SERPL: ABNORMAL (CALC) (ref 6–22)
CALCIUM SERPL-MCNC: 9.3 MG/DL (ref 8.6–10.4)
CHLORIDE SERPL-SCNC: 107 MMOL/L (ref 98–110)
CHOLEST SERPL-MCNC: 145 MG/DL
CHOLEST/HDLC SERPL: 2.7 (CALC)
CO2 SERPL-SCNC: 29 MMOL/L (ref 20–32)
CREAT SERPL-MCNC: 0.7 MG/DL (ref 0.5–1.05)
CREAT UR-MCNC: 73 MG/DL (ref 20–275)
EOSINOPHIL # BLD AUTO: 110 CELLS/UL (ref 15–500)
EOSINOPHIL NFR BLD AUTO: 1.9 %
ERYTHROCYTE [DISTWIDTH] IN BLOOD BY AUTOMATED COUNT: 14.9 % (ref 11–15)
GFR/BSA.PRED SERPLBLD CYS-BASED-ARV: 99 ML/MIN/1.73M2
GLOBULIN SER CALC-MCNC: 2.6 G/DL (CALC) (ref 1.9–3.7)
GLUCOSE SERPL-MCNC: 116 MG/DL (ref 65–99)
HCT VFR BLD AUTO: 34.6 % (ref 35–45)
HDLC SERPL-MCNC: 54 MG/DL
HGB BLD-MCNC: 11.4 G/DL (ref 11.7–15.5)
LDLC SERPL CALC-MCNC: 71 MG/DL (CALC)
LYMPHOCYTES # BLD AUTO: 1583 CELLS/UL (ref 850–3900)
LYMPHOCYTES NFR BLD AUTO: 27.3 %
MCH RBC QN AUTO: 28.1 PG (ref 27–33)
MCHC RBC AUTO-ENTMCNC: 32.9 G/DL (ref 32–36)
MCV RBC AUTO: 85.4 FL (ref 80–100)
MICROALBUMIN UR-MCNC: 0.5 MG/DL
MONOCYTES # BLD AUTO: 690 CELLS/UL (ref 200–950)
MONOCYTES NFR BLD AUTO: 11.9 %
NEUTROPHILS # BLD AUTO: 3387 CELLS/UL (ref 1500–7800)
NEUTROPHILS NFR BLD AUTO: 58.4 %
NONHDLC SERPL-MCNC: 91 MG/DL (CALC)
PLATELET # BLD AUTO: 152 THOUSAND/UL (ref 140–400)
PMV BLD REES-ECKER: 12.8 FL (ref 7.5–12.5)
POTASSIUM SERPL-SCNC: 5 MMOL/L (ref 3.5–5.3)
PROT SERPL-MCNC: 6.6 G/DL (ref 6.1–8.1)
RBC # BLD AUTO: 4.05 MILLION/UL (ref 3.8–5.1)
SODIUM SERPL-SCNC: 141 MMOL/L (ref 135–146)
T4 FREE SERPL-MCNC: 1.7 NG/DL (ref 0.8–1.8)
TRIGL SERPL-MCNC: 122 MG/DL
TSH SERPL-ACNC: 0.19 MIU/L (ref 0.4–4.5)
WBC # BLD AUTO: 5.8 THOUSAND/UL (ref 3.8–10.8)

## 2022-09-10 PROCEDURE — 3061F NEG MICROALBUMINURIA REV: CPT | Performed by: OBSTETRICS & GYNECOLOGY

## 2022-09-15 DIAGNOSIS — E03.9 HYPOTHYROIDISM, UNSPECIFIED TYPE: Primary | ICD-10-CM

## 2022-11-07 ENCOUNTER — OFFICE VISIT (OUTPATIENT)
Dept: URGENT CARE | Facility: CLINIC | Age: 62
End: 2022-11-07

## 2022-11-07 VITALS
HEART RATE: 86 BPM | SYSTOLIC BLOOD PRESSURE: 160 MMHG | RESPIRATION RATE: 20 BRPM | OXYGEN SATURATION: 100 % | DIASTOLIC BLOOD PRESSURE: 86 MMHG | TEMPERATURE: 97.4 F

## 2022-11-07 DIAGNOSIS — R68.89 FLU-LIKE SYMPTOMS: Primary | ICD-10-CM

## 2022-11-07 DIAGNOSIS — E78.2 MIXED HYPERLIPIDEMIA: ICD-10-CM

## 2022-11-07 DIAGNOSIS — I10 ESSENTIAL HYPERTENSION: ICD-10-CM

## 2022-11-07 DIAGNOSIS — R05.1 ACUTE COUGH: ICD-10-CM

## 2022-11-07 DIAGNOSIS — E03.9 HYPOTHYROIDISM, UNSPECIFIED TYPE: ICD-10-CM

## 2022-11-07 DIAGNOSIS — I25.10 CORONARY ARTERY DISEASE INVOLVING NATIVE CORONARY ARTERY OF NATIVE HEART WITHOUT ANGINA PECTORIS: ICD-10-CM

## 2022-11-07 DIAGNOSIS — J02.9 SORE THROAT: ICD-10-CM

## 2022-11-07 LAB
S PYO AG THROAT QL: NEGATIVE
SARS-COV-2 AG UPPER RESP QL IA: NEGATIVE
VALID CONTROL: NORMAL

## 2022-11-07 RX ORDER — CARVEDILOL 12.5 MG/1
TABLET ORAL
Qty: 180 TABLET | Refills: 1 | Status: SHIPPED | OUTPATIENT
Start: 2022-11-07

## 2022-11-07 RX ORDER — LISINOPRIL 20 MG/1
TABLET ORAL
Qty: 90 TABLET | Refills: 1 | Status: SHIPPED | OUTPATIENT
Start: 2022-11-07

## 2022-11-07 RX ORDER — PROMETHAZINE HYDROCHLORIDE 6.25 MG/5ML
6.25 SYRUP ORAL 4 TIMES DAILY PRN
Qty: 118 ML | Refills: 0 | Status: SHIPPED | OUTPATIENT
Start: 2022-11-07

## 2022-11-07 RX ORDER — LEVOTHYROXINE SODIUM 175 UG/1
175 TABLET ORAL DAILY
Qty: 90 TABLET | Refills: 1 | Status: SHIPPED | OUTPATIENT
Start: 2022-11-07

## 2022-11-07 RX ORDER — EZETIMIBE AND SIMVASTATIN 10; 40 MG/1; MG/1
TABLET ORAL
Qty: 90 TABLET | Refills: 1 | Status: SHIPPED | OUTPATIENT
Start: 2022-11-07

## 2022-11-07 NOTE — PROGRESS NOTES
[facility]        NAME: Nanette Cantu is a 65 y o  female  : 1960    MRN: [id number]  DATE: 2022  TIME: 1:35 PM    Assessment and Plan   Flu-like symptoms [R68 89]  1  Flu-like symptoms  Poct Covid 19 Rapid Antigen Test   2  Sore throat  POCT rapid strepA   3  Acute cough  promethazine (PHENERGAN) 12 5 mg/10 mL syrup         Patient Instructions   Both your rapid strep and COVID were negative  Follow up with PCP in 3-5 days  Proceed to  ER if symptoms worsen  Chief Complaint     Chief Complaint   Patient presents with   • Cold Like Symptoms     C/o fever, productive cough, chills, and sore throat since sat  States granddaughter has bronchitis and she feel she too has bronchitis          History of Present Illness       64 yo female C/o fever, productive cough, chills, and sore throat since sat  States granddaughter has bronchitis and she feel she too has bronchitis  Review of Systems   Review of Systems   Constitutional: Positive for fever  Negative for activity change, appetite change, chills and fatigue  HENT: Positive for congestion, ear pain, postnasal drip, rhinorrhea and sore throat  Negative for sinus pressure  Eyes: Negative for visual disturbance  Respiratory: Positive for cough  Negative for chest tightness, shortness of breath and wheezing  Cardiovascular: Negative for chest pain and palpitations  Gastrointestinal: Negative for abdominal pain, diarrhea, nausea and vomiting  Musculoskeletal: Negative for arthralgias, myalgias and neck stiffness  Skin: Negative for color change and rash  Neurological: Negative for dizziness, weakness and headaches  Psychiatric/Behavioral: Negative            Current Medications       Current Outpatient Medications:   •  Aspirin Low Dose 81 MG EC tablet, TAKE 1 TABLET BY MOUTH EVERY DAY, Disp: 90 tablet, Rfl: 1  •  carvedilol (COREG) 12 5 mg tablet, Take 1 tablet (12 5 mg total) by mouth 2 (two) times a day, Disp: 180 tablet, Rfl: 1  •  Diclofenac Sodium (VOLTAREN) 1 %, Apply 2 g topically 4 (four) times a day, Disp: 100 g, Rfl: 2  •  ezetimibe-simvastatin (VYTORIN) 10-40 mg per tablet, Take 1 tablet by mouth daily at bedtime, Disp: 90 tablet, Rfl: 1  •  levothyroxine 175 mcg tablet, Take 1 tablet (175 mcg total) by mouth daily Patient takes 7 1/2 tablets by mouth per week    One daily and on Sunday 1 5 tablet, Disp: 90 tablet, Rfl: 1  •  lisinopril (ZESTRIL) 20 mg tablet, Take 1 tablet (20 mg total) by mouth daily, Disp: 90 tablet, Rfl: 1  •  metFORMIN (GLUCOPHAGE) 1000 MG tablet, Take 1 tablet (1,000 mg total) by mouth 2 (two) times a day with meals, Disp: 180 tablet, Rfl: 1  •  promethazine (PHENERGAN) 12 5 mg/10 mL syrup, Take 5 mL (6 25 mg total) by mouth 4 (four) times a day as needed for nausea or vomiting, Disp: 118 mL, Rfl: 0    Current Allergies     Allergies as of 11/07/2022 - Reviewed 11/07/2022   Allergen Reaction Noted   • Kiwi extract - food allergy Anaphylaxis 07/01/2021   • Shrimp (diagnostic) - food allergy Anaphylaxis 06/20/2022   • Shellfish-derived products - food allergy Rash 08/30/2022   • Latex Hives 04/22/2021            The following portions of the patient's history were reviewed and updated as appropriate: allergies, current medications, past family history, past medical history, past social history, past surgical history and problem list      Past Medical History:   Diagnosis Date   • Diabetes mellitus ([facility] [address] )    • Disease of thyroid gland    • Hypertension        Past Surgical History:   Procedure Laterality Date   • CARDIAC SURGERY         Family History   Problem Relation Age of Onset   • Cervical cancer Mother         hysterectomy   • Breast cancer Maternal Aunt         masectomy   • Breast cancer Maternal Aunt         found lump in nipple   • No Known Problems Sister    • Diabetes Daughter    • No Known Problems Maternal Grandmother    • Prostate cancer Maternal Grandfather    • Heart disease Paternal Aunt    • Heart disease Paternal Aunt          Medications have been verified  Objective   /86   Pulse 86   Temp (!) 97 4 °F (36 3 °C)   Resp 20   LMP 09/17/1999 (Exact Date)   SpO2 100%        Physical Exam     Physical Exam  Vitals and nursing note reviewed  Constitutional:       General: She is not in acute distress  Appearance: Normal appearance  She is well-developed  She is obese  She is not ill-appearing  HENT:      Head: Normocephalic and atraumatic  Right Ear: Tympanic membrane, ear canal and external ear normal       Left Ear: Tympanic membrane, ear canal and external ear normal       Nose: Congestion and rhinorrhea present  Mouth/Throat:      Mouth: Mucous membranes are moist  No oral lesions  Pharynx: No oropharyngeal exudate or posterior oropharyngeal erythema  Comments: Hyperemic pharynx with clear postnasal drip  Eyes:      General: No scleral icterus  Extraocular Movements:      Right eye: Normal extraocular motion  Left eye: Normal extraocular motion  Conjunctiva/sclera: Conjunctivae normal       Pupils: Pupils are equal, round, and reactive to light  Cardiovascular:      Rate and Rhythm: Normal rate and regular rhythm  Pulses: Normal pulses  Heart sounds: Normal heart sounds  Pulmonary:      Effort: Pulmonary effort is normal  No respiratory distress  Breath sounds: Rhonchi present  No wheezing or rales  Abdominal:      General: Bowel sounds are normal       Palpations: Abdomen is soft  There is no mass  Tenderness: There is no abdominal tenderness  There is no guarding or rebound  Musculoskeletal:         General: Normal range of motion  Cervical back: Normal range of motion and neck supple  Lymphadenopathy:      Cervical: No cervical adenopathy  Skin:     General: Skin is warm and dry  Capillary Refill: Capillary refill takes less than 2 seconds  Findings: No rash  Neurological:      General: No focal deficit present  Mental Status: She is alert and oriented to person, place, and time  Cranial Nerves: No cranial nerve deficit  Coordination: Coordination normal       Gait: Gait normal    Psychiatric:         Mood and Affect: Mood normal          Behavior: Behavior normal          Thought Content:  Thought content normal          Judgment: Judgment normal

## 2022-11-08 ENCOUNTER — NURSE TRIAGE (OUTPATIENT)
Dept: OTHER | Facility: OTHER | Age: 62
End: 2022-11-08

## 2022-11-09 ENCOUNTER — OFFICE VISIT (OUTPATIENT)
Dept: FAMILY MEDICINE CLINIC | Facility: CLINIC | Age: 62
End: 2022-11-09

## 2022-11-09 VITALS
TEMPERATURE: 98 F | OXYGEN SATURATION: 96 % | SYSTOLIC BLOOD PRESSURE: 158 MMHG | HEIGHT: 66 IN | HEART RATE: 92 BPM | BODY MASS INDEX: 41.14 KG/M2 | DIASTOLIC BLOOD PRESSURE: 92 MMHG | WEIGHT: 256 LBS

## 2022-11-09 DIAGNOSIS — R52 BODY ACHES: ICD-10-CM

## 2022-11-09 DIAGNOSIS — R50.9 FEVER, UNSPECIFIED FEVER CAUSE: Primary | ICD-10-CM

## 2022-11-09 RX ORDER — OSELTAMIVIR PHOSPHATE 75 MG/1
75 CAPSULE ORAL EVERY 12 HOURS SCHEDULED
Qty: 10 CAPSULE | Refills: 0 | Status: SHIPPED | OUTPATIENT
Start: 2022-11-09 | End: 2022-11-14

## 2022-11-09 RX ORDER — LATANOPROST 50 UG/ML
SOLUTION/ DROPS OPHTHALMIC
COMMUNITY
Start: 2022-11-01 | End: 2023-09-18 | Stop reason: ALTCHOICE

## 2022-11-09 RX ORDER — DEXTROMETHORPHAN HYDROBROMIDE AND PROMETHAZINE HYDROCHLORIDE 15; 6.25 MG/5ML; MG/5ML
SYRUP ORAL
COMMUNITY
Start: 2022-11-07 | End: 2023-04-20 | Stop reason: ALTCHOICE

## 2022-11-09 RX ORDER — AMOXICILLIN AND CLAVULANATE POTASSIUM 875; 125 MG/1; MG/1
1 TABLET, FILM COATED ORAL EVERY 12 HOURS SCHEDULED
Qty: 14 TABLET | Refills: 0 | Status: SHIPPED | OUTPATIENT
Start: 2022-11-09 | End: 2022-11-16

## 2022-11-09 NOTE — PROGRESS NOTES
Name: Christopher Wadsworth      : 1960      MRN: [id number]  Encounter Provider: Rola Olmos MD  Encounter Date: 2022   Encounter department: [address] [place] [address]     1  Fever, unspecified fever cause  -     oseltamivir (TAMIFLU) 75 mg capsule; Take 1 capsule (75 mg total) by mouth every 12 (twelve) hours for 5 days  -     amoxicillin-clavulanate (Augmentin) 875-125 mg per tablet; Take 1 tablet by mouth every 12 (twelve) hours for 7 days  -     COVID/FLU/RSV; Future  -     COVID/FLU/RSV    2  Body aches  -     oseltamivir (TAMIFLU) 75 mg capsule; Take 1 capsule (75 mg total) by mouth every 12 (twelve) hours for 5 days  -     amoxicillin-clavulanate (Augmentin) 875-125 mg per tablet; Take 1 tablet by mouth every 12 (twelve) hours for 7 days  -     COVID/FLU/RSV; Future  -     COVID/FLU/RSV    if symptoms worsen recommend going to the ER       Subjective     Patient is here because she has been having fevers T-max 102 F  Also associated with body aches and chills  She went to care now 2 days ago tested for rapid covid and strep both negative  Review of Systems   Constitutional: Positive for chills, fatigue and fever  Negative for activity change and appetite change  HENT: Negative for congestion and ear discharge  Respiratory: Negative for cough and shortness of breath  Cardiovascular: Negative for chest pain and palpitations  Gastrointestinal: Negative for diarrhea and nausea  Musculoskeletal: Positive for myalgias  Negative for arthralgias and back pain  Skin: Negative for color change and rash  Neurological: Negative for dizziness and headaches  Psychiatric/Behavioral: Negative for agitation and behavioral problems         Past Medical History:   Diagnosis Date   • Diabetes mellitus ([facility] [address] )    • Disease of thyroid gland    • Hypertension      Past Surgical History:   Procedure Laterality Date   • CARDIAC SURGERY       Family History   Problem Relation Age of Onset   • Cervical cancer Mother         hysterectomy   • Breast cancer Maternal Aunt         masectomy   • Breast cancer Maternal Aunt         found lump in nipple   • No Known Problems Sister    • Diabetes Daughter    • No Known Problems Maternal Grandmother    • Prostate cancer Maternal Grandfather    • Heart disease Paternal Aunt    • Heart disease Paternal Aunt      Social History     Socioeconomic History   • Marital status: /Civil Union     Spouse name: Not on file   • Number of children: Not on file   • Years of education: Not on file   • Highest education level: Not on file   Occupational History   • Not on file   Tobacco Use   • Smoking status: Never Smoker   • Smokeless tobacco: Never Used   Vaping Use   • Vaping Use: Never used   Substance and Sexual Activity   • Alcohol use: Never   • Drug use: Never   • Sexual activity: Not Currently     Partners: Male     Birth control/protection: Post-menopausal   Other Topics Concern   • Not on file   Social History Narrative   • Not on file     Social Determinants of Health     Financial Resource Strain: Not on file   Food Insecurity: Not on file   Transportation Needs: Not on file   Physical Activity: Not on file   Stress: Not on file   Social Connections: Not on file   Intimate Partner Violence: Not on file   Housing Stability: Not on file     Current Outpatient Medications on File Prior to Visit   Medication Sig   • Aspirin Low Dose 81 MG EC tablet TAKE 1 TABLET BY MOUTH EVERY DAY   • carvedilol (COREG) 12 5 mg tablet TAKE 1 TABLET BY MOUTH 2 TIMES A DAY  • Diclofenac Sodium (VOLTAREN) 1 % Apply 2 g topically 4 (four) times a day   • ezetimibe-simvastatin (VYTORIN) 10-40 mg per tablet TAKE 1 TABLET BY MOUTH EVERYDAY AT BEDTIME   • levothyroxine 175 mcg tablet TAKE 1 TABLET (175 MCG TOTAL) BY MOUTH DAILY PATIENT TAKES 7 1/2 TABLETS BY MOUTH PER WEEK   ONE DAILY AND ON SUNDAY 1 5 TABLET   • lisinopril (ZESTRIL) 20 mg tablet TAKE 1 TABLET BY MOUTH EVERY DAY   • metFORMIN (GLUCOPHAGE) 1000 MG tablet Take 1 tablet (1,000 mg total) by mouth 2 (two) times a day with meals   • promethazine (PHENERGAN) 12 5 mg/10 mL syrup Take 5 mL (6 25 mg total) by mouth 4 (four) times a day as needed for nausea or vomiting     Allergies   Allergen Reactions   • Kiwi Extract - Food Allergy Anaphylaxis   • Shrimp (Diagnostic) - Food Allergy Anaphylaxis   • Shellfish-Derived Products - Food Allergy Rash     All Seafood    • Latex Hives     Immunization History   Administered Date(s) Administered   • COVID-19 MODERNA VACC 0 5 ML IM 07/05/2021, 08/07/2021   • INFLUENZA 08/30/2022   • Influenza, injectable, quadrivalent, preservative free 0 5 mL 08/30/2022   • Influenza, recombinant, quadrivalent,injectable, preservative free 10/04/2021       Objective     /92 (BP Location: Left arm, Patient Position: Sitting, Cuff Size: Large)   Pulse 92   Temp 98 °F (36 7 °C)   Ht 5' 6" (1 676 m)   Wt 116 kg (256 lb)   LMP 09/17/1999 (Exact Date)   SpO2 96%   BMI 41 32 kg/m²     Physical Exam  Constitutional:       General: She is in acute distress  Appearance: She is well-developed  She is ill-appearing  She is not diaphoretic  HENT:      Head: Normocephalic and atraumatic  Nose: Nose normal    Eyes:      Conjunctiva/sclera: Conjunctivae normal       Pupils: Pupils are equal, round, and reactive to light  Cardiovascular:      Rate and Rhythm: Normal rate and regular rhythm  Heart sounds: Normal heart sounds  No murmur heard  Pulmonary:      Effort: Pulmonary effort is normal  No respiratory distress  Breath sounds: Normal breath sounds  No wheezing  Abdominal:      General: Bowel sounds are normal  There is no distension  Palpations: Abdomen is soft  Tenderness: There is no abdominal tenderness  Skin:     General: Skin is warm and dry  Findings: No erythema or rash     Neurological:      Mental Status: She is alert and oriented to person, place, and time         Scott Burton MD

## 2022-11-09 NOTE — TELEPHONE ENCOUNTER
Reason for Disposition  • [1] Fever > 101 F (38 3 C) AND [2] age > 60 years    Answer Assessment - Initial Assessment Questions  1  ONSET: "When did the cough begin?"       This past Friday    2  SEVERITY: "How bad is the cough today?"       Pretty bad     3  SPUTUM: "Describe the color of your sputum" (none, dry cough; clear, white, yellow, green)      Denies    4  HEMOPTYSIS: "Are you coughing up any blood?" If so ask: "How much?" (flecks, streaks, tablespoons, etc )      Denies    5  DIFFICULTY BREATHING: "Are you having difficulty breathing?" If Yes, ask: "How bad is it?" (e g , mild, moderate, severe)     - MILD: No SOB at rest, mild SOB with walking, speaks normally in sentences, can lay down, no retractions, pulse < 100      - MODERATE: SOB at rest, SOB with minimal exertion and prefers to sit, cannot lie down flat, speaks in phrases, mild retractions, audible wheezing, pulse 100-120      - SEVERE: Very SOB at rest, speaks in single words, struggling to breathe, sitting hunched forward, retractions, pulse > 120       Denies    6  FEVER: "Do you have a fever?" If Yes, ask: "What is your temperature, how was it measured, and when did it start?"      Yes, 102 1 oral     7  CARDIAC HISTORY: "Do you have any history of heart disease?" (e g , heart attack, congestive heart failure)       Yes, heart stent    8  LUNG HISTORY: "Do you have any history of lung disease?"  (e g , pulmonary embolus, asthma, emphysema)      Asthma     9  PE RISK FACTORS: "Do you have a history of blood clots?" (or: recent major surgery, recent prolonged travel, bedridden)      Denies    10  OTHER SYMPTOMS: "Do you have any other symptoms?" (e g , runny nose, wheezing, chest pain)        Wheezing, lungs heart from coughing    11  PREGNANCY: "Is there any chance you are pregnant?" "When was your last menstrual period?"       N/A    12   TRAVEL: "Have you traveled out of the country in the last month?" (e g , travel history, exposures) Denies    Protocols used: COUGH - ACUTE NON-PRODUCTIVE-ADULT-AH

## 2022-11-09 NOTE — TELEPHONE ENCOUNTER
Regardin/2 - Fever 102 1/Lungs Hurting  ----- Message from Franky Rinaldi sent at 2022  7:01 PM EST -----  "My mom is not feeling well  She went to the urgent care yesterday and they told her she has Bronchitis  She didn't have a fever yesterday, but today she's having a fever and she's saying that her lungs hurt   Her temperature right now is 102 1"

## 2022-11-10 LAB
FLUAV RNA RESP QL NAA+PROBE: POSITIVE
FLUBV RNA RESP QL NAA+PROBE: NEGATIVE
RSV RNA RESP QL NAA+PROBE: NEGATIVE
SARS-COV-2 RNA RESP QL NAA+PROBE: NEGATIVE

## 2023-01-08 PROBLEM — R50.9 FEVER: Status: RESOLVED | Noted: 2022-11-09 | Resolved: 2023-01-08

## 2023-01-26 DIAGNOSIS — E11.9 TYPE 2 DIABETES MELLITUS WITHOUT COMPLICATION, WITHOUT LONG-TERM CURRENT USE OF INSULIN (HCC): ICD-10-CM

## 2023-03-15 ENCOUNTER — OFFICE VISIT (OUTPATIENT)
Dept: FAMILY MEDICINE CLINIC | Facility: CLINIC | Age: 63
End: 2023-03-15

## 2023-03-15 VITALS
HEIGHT: 66 IN | BODY MASS INDEX: 46.12 KG/M2 | DIASTOLIC BLOOD PRESSURE: 76 MMHG | OXYGEN SATURATION: 99 % | HEART RATE: 79 BPM | SYSTOLIC BLOOD PRESSURE: 124 MMHG | WEIGHT: 287 LBS | TEMPERATURE: 98.2 F

## 2023-03-15 DIAGNOSIS — D69.6 THROMBOCYTOPENIA (HCC): ICD-10-CM

## 2023-03-15 DIAGNOSIS — I15.2 HYPERTENSION COMPLICATING DIABETES (HCC): ICD-10-CM

## 2023-03-15 DIAGNOSIS — E11.69 TYPE 2 DIABETES MELLITUS WITH OBESITY (HCC): ICD-10-CM

## 2023-03-15 DIAGNOSIS — Z12.31 ENCOUNTER FOR SCREENING MAMMOGRAM FOR MALIGNANT NEOPLASM OF BREAST: ICD-10-CM

## 2023-03-15 DIAGNOSIS — I25.10 CORONARY ARTERY DISEASE INVOLVING NATIVE CORONARY ARTERY OF NATIVE HEART WITHOUT ANGINA PECTORIS: ICD-10-CM

## 2023-03-15 DIAGNOSIS — E11.59 HYPERTENSION COMPLICATING DIABETES (HCC): ICD-10-CM

## 2023-03-15 DIAGNOSIS — E03.9 HYPOTHYROIDISM, UNSPECIFIED TYPE: Primary | ICD-10-CM

## 2023-03-15 DIAGNOSIS — Z15.01 BRCA POSITIVE: ICD-10-CM

## 2023-03-15 DIAGNOSIS — E11.69 HYPERLIPIDEMIA ASSOCIATED WITH TYPE 2 DIABETES MELLITUS (HCC): ICD-10-CM

## 2023-03-15 DIAGNOSIS — E78.5 HYPERLIPIDEMIA ASSOCIATED WITH TYPE 2 DIABETES MELLITUS (HCC): ICD-10-CM

## 2023-03-15 DIAGNOSIS — E11.9 TYPE 2 DIABETES MELLITUS WITHOUT COMPLICATION, WITHOUT LONG-TERM CURRENT USE OF INSULIN (HCC): ICD-10-CM

## 2023-03-15 DIAGNOSIS — E66.9 TYPE 2 DIABETES MELLITUS WITH OBESITY (HCC): ICD-10-CM

## 2023-03-15 DIAGNOSIS — Z15.09 BRCA POSITIVE: ICD-10-CM

## 2023-03-15 DIAGNOSIS — I83.813 VARICOSE VEINS OF BOTH LOWER EXTREMITIES WITH PAIN: ICD-10-CM

## 2023-03-15 LAB — SL AMB POCT HEMOGLOBIN AIC: 6.5 (ref ?–6.5)

## 2023-03-15 NOTE — PROGRESS NOTES
Name: Laurie Reina      : 1960      MRN: [id number]  Encounter Provider: Isra Mehta PA-C  Encounter Date: 3/15/2023   Encounter department: [address] [place] [address]     1  Hypothyroidism, unspecified type  -     TSH, 3rd generation with Free T4 reflex; Future; Expected date: 06/15/2023    2  Hypertension complicating diabetes ([facility] )  -     Comprehensive metabolic panel; Future; Expected date: 06/15/2023    3  Type 2 diabetes mellitus without complication, without long-term current use of insulin (HCC)  -     Comprehensive metabolic panel; Future; Expected date: 06/15/2023    4  Hyperlipidemia associated with type 2 diabetes mellitus ([facility] )  -     Lipid Panel with Direct LDL reflex; Future; Expected date: 06/15/2023    5  Type 2 diabetes mellitus with obesity (HCC)  -     POCT hemoglobin A1c  -     Comprehensive metabolic panel; Future; Expected date: 06/15/2023  -     CBC and differential; Future; Expected date: 06/15/2023  -     Microalbumin / creatinine urine ratio; Future; Expected date: 06/15/2023    6  Coronary artery disease involving native coronary artery of native heart without angina pectoris  -     CBC and differential; Future; Expected date: 06/15/2023    7  Thrombocytopenia (HCC)  -     CBC and differential; Future; Expected date: 06/15/2023    8  BRCA positive  -     Mammo screening bilateral w 3d & cad; Future; Expected date: 03/15/2023    9  Varicose veins of both lower extremities with pain  -     Ambulatory Referral to Vascular Surgery; Future  -     Compression Stocking    10  Encounter for screening mammogram for malignant neoplasm of breast  -     Mammo screening bilateral w 3d & cad; Future; Expected date: 03/15/2023  DM remains controlled but a1c did increase   Discussed addition of ozmepic as this could also aid weight loss, pt not interested at this time, will think about it  BP well controlled  Continue statin  Labs as above  Follow up with cardiology, vascular  No cardiac complaints  Daily compression for venous varicosities with swelling  Update mammography  3 month follow up, earlier prn    BMI Counseling: Body mass index is 46 32 kg/m²  The BMI is above normal  Nutrition recommendations include reducing portion sizes, decreasing overall calorie intake, moderation in carbohydrate intake, increasing intake of lean protein, reducing intake of saturated fat and trans fat and reducing intake of cholesterol  Exercise recommendations include moderate aerobic physical activity for 150 minutes/week  Subjective     DM: a1c 6 5 from 5 9  on metformin  On ace, statin  HLD: due for FLP, historically well controlled on simvastatin ezitimbe  HTN: on lisinopril, coreg, /76  CAD: no cardiac complaints Hx of stenting to the LAD, on exertional CP, no SOB  On ASA, statin, coreg  Normal stress test apparently in 2020  She follows with cardiology, due for follow up  Hypothyroid: on levothyroxine 175mcg, due for TSH  Due for mammography, BRCA+  Continues to have pain/swelling from chronic venous varicosities    Review of Systems   Constitutional: Negative for chills, fatigue and fever  HENT: Negative for congestion, ear pain, hearing loss, nosebleeds, postnasal drip, rhinorrhea, sinus pressure, sinus pain, sneezing and sore throat  Eyes: Negative for pain, discharge, itching and visual disturbance  Respiratory: Negative for cough, chest tightness, shortness of breath and wheezing  Cardiovascular: Negative for chest pain, palpitations and leg swelling  Gastrointestinal: Negative for abdominal pain, blood in stool, constipation, diarrhea, nausea and vomiting  Genitourinary: Negative for frequency and urgency  Neurological: Negative for dizziness, light-headedness and numbness         Past Medical History:   Diagnosis Date   • Diabetes mellitus ([facility] )    • Disease of thyroid gland    • Hypertension      Past Surgical History:   Procedure Laterality Date   • CARDIAC SURGERY       Family History   Problem Relation Age of Onset   • Cervical cancer Mother         hysterectomy   • Breast cancer Maternal Aunt         masectomy   • Breast cancer Maternal Aunt         found lump in nipple   • No Known Problems Sister    • Diabetes Daughter    • No Known Problems Maternal Grandmother    • Prostate cancer Maternal Grandfather    • Heart disease Paternal Aunt    • Heart disease Paternal Aunt      Social History     Socioeconomic History   • Marital status: /Civil Union     Spouse name: None   • Number of children: None   • Years of education: None   • Highest education level: None   Occupational History   • None   Tobacco Use   • Smoking status: Never   • Smokeless tobacco: Never   Vaping Use   • Vaping Use: Never used   Substance and Sexual Activity   • Alcohol use: Never   • Drug use: Never   • Sexual activity: Not Currently     Partners: Male     Birth control/protection: Post-menopausal   Other Topics Concern   • None   Social History Narrative   • None     Social Determinants of Health     Financial Resource Strain: Not on file   Food Insecurity: Not on file   Transportation Needs: Not on file   Physical Activity: Not on file   Stress: Not on file   Social Connections: Not on file   Intimate Partner Violence: Not on file   Housing Stability: Not on file     Current Outpatient Medications on File Prior to Visit   Medication Sig   • Aspirin Low Dose 81 MG EC tablet TAKE 1 TABLET BY MOUTH EVERY DAY   • carvedilol (COREG) 12 5 mg tablet TAKE 1 TABLET BY MOUTH 2 TIMES A DAY     • Diclofenac Sodium (VOLTAREN) 1 % Apply 2 g topically 4 (four) times a day   • ezetimibe-simvastatin (VYTORIN) 10-40 mg per tablet TAKE 1 TABLET BY MOUTH EVERYDAY AT BEDTIME   • latanoprost (XALATAN) 0 005 % ophthalmic solution USE 1 DROP IN [facility] EYE AT BEDTIME   • levothyroxine 175 mcg tablet TAKE 1 TABLET (175 MCG TOTAL) BY MOUTH DAILY PATIENT TAKES 7 1/2 TABLETS BY MOUTH PER WEEK  ONE DAILY AND ON SUNDAY 1 5 TABLET   • lisinopril (ZESTRIL) 20 mg tablet TAKE 1 TABLET BY MOUTH EVERY DAY   • metFORMIN (GLUCOPHAGE) 1000 MG tablet TAKE 1 TABLET BY MOUTH TWICE A DAY WITH MEALS   • promethazine (PHENERGAN) 12 5 mg/10 mL syrup Take 5 mL (6 25 mg total) by mouth 4 (four) times a day as needed for nausea or vomiting (Patient not taking: Reported on 3/15/2023)   • promethazine-dextromethorphan (PHENERGAN-DM) 6 25-15 mg/5 mL oral syrup TAKE 5 ML (6 25 MG TOTAL) BY MOUTH 4 (FOUR) TIMES A DAY AS NEEDED FOR NAUSEA OR VOMITING (Patient not taking: Reported on 3/15/2023)     Allergies   Allergen Reactions   • Kiwi Extract - Food Allergy Anaphylaxis   • Shrimp (Diagnostic) - Food Allergy Anaphylaxis   • Shellfish-Derived Products - Food Allergy Rash     All Seafood    • Latex Hives     Immunization History   Administered Date(s) Administered   • COVID-19 MODERNA VACC 0 5 ML IM 07/05/2021, 08/07/2021   • INFLUENZA 08/30/2022   • Influenza, injectable, quadrivalent, preservative free 0 5 mL 08/30/2022   • Influenza, recombinant, quadrivalent,injectable, preservative free 10/04/2021       Objective     /76 (BP Location: Left arm, Patient Position: Sitting, Cuff Size: Large)   Pulse 79   Temp 98 2 °F (36 8 °C)   Ht 5' 6" (1 676 m)   Wt 130 kg (287 lb)   LMP 09/17/1999 (Exact Date)   SpO2 99%   BMI 46 32 kg/m²     Physical Exam  Vitals and nursing note reviewed  Constitutional:       General: She is not in acute distress  Appearance: Normal appearance  HENT:      Head: Normocephalic and atraumatic  Nose: Nose normal       Mouth/Throat:      Mouth: Mucous membranes are moist       Pharynx: Oropharynx is clear  No oropharyngeal exudate or posterior oropharyngeal erythema  Eyes:      Pupils: Pupils are equal, round, and reactive to light  Cardiovascular:      Rate and Rhythm: Normal rate and regular rhythm  Heart sounds: Normal heart sounds  No murmur heard    Pulmonary: Effort: Pulmonary effort is normal  No respiratory distress  Breath sounds: Normal breath sounds  No wheezing, rhonchi or rales  Abdominal:      General: Bowel sounds are normal       Palpations: Abdomen is soft  Musculoskeletal:         General: Normal range of motion  Cervical back: Normal range of motion and neck supple  Right lower leg: Edema (chronic, venous insuffiency) present  Left lower leg: Edema (chronic venous insufficiency) present  Skin:     General: Skin is warm and dry  Neurological:      Mental Status: She is alert and oriented to person, place, and time     Psychiatric:         Mood and Affect: Mood and affect normal        Ramila Amador PA-C

## 2023-03-17 LAB
ALBUMIN SERPL-MCNC: 3.9 G/DL (ref 3.6–5.1)
ALBUMIN/CREAT UR: 8 MCG/MG CREAT
ALBUMIN/GLOB SERPL: 1.8 (CALC) (ref 1–2.5)
ALP SERPL-CCNC: 77 U/L (ref 37–153)
ALT SERPL-CCNC: 21 U/L (ref 6–29)
AST SERPL-CCNC: 18 U/L (ref 10–35)
BASOPHILS # BLD AUTO: 50 CELLS/UL (ref 0–200)
BASOPHILS NFR BLD AUTO: 0.8 %
BILIRUB SERPL-MCNC: 0.3 MG/DL (ref 0.2–1.2)
BUN SERPL-MCNC: 15 MG/DL (ref 7–25)
BUN/CREAT SERPL: ABNORMAL (CALC) (ref 6–22)
CALCIUM SERPL-MCNC: 9.2 MG/DL (ref 8.6–10.4)
CHLORIDE SERPL-SCNC: 107 MMOL/L (ref 98–110)
CHOLEST SERPL-MCNC: 156 MG/DL
CHOLEST/HDLC SERPL: 2.8 (CALC)
CO2 SERPL-SCNC: 27 MMOL/L (ref 20–32)
CREAT SERPL-MCNC: 0.66 MG/DL (ref 0.5–1.05)
CREAT UR-MCNC: 86 MG/DL (ref 20–275)
EOSINOPHIL # BLD AUTO: 120 CELLS/UL (ref 15–500)
EOSINOPHIL NFR BLD AUTO: 1.9 %
ERYTHROCYTE [DISTWIDTH] IN BLOOD BY AUTOMATED COUNT: 14.8 % (ref 11–15)
GFR/BSA.PRED SERPLBLD CYS-BASED-ARV: 100 ML/MIN/1.73M2
GLOBULIN SER CALC-MCNC: 2.2 G/DL (CALC) (ref 1.9–3.7)
GLUCOSE SERPL-MCNC: 113 MG/DL (ref 65–99)
HCT VFR BLD AUTO: 34.7 % (ref 35–45)
HDLC SERPL-MCNC: 56 MG/DL
HGB BLD-MCNC: 11.4 G/DL (ref 11.7–15.5)
LDLC SERPL CALC-MCNC: 77 MG/DL (CALC)
LYMPHOCYTES # BLD AUTO: 1802 CELLS/UL (ref 850–3900)
LYMPHOCYTES NFR BLD AUTO: 28.6 %
MCH RBC QN AUTO: 28.3 PG (ref 27–33)
MCHC RBC AUTO-ENTMCNC: 32.9 G/DL (ref 32–36)
MCV RBC AUTO: 86.1 FL (ref 80–100)
MICROALBUMIN UR-MCNC: 0.7 MG/DL
MONOCYTES # BLD AUTO: 636 CELLS/UL (ref 200–950)
MONOCYTES NFR BLD AUTO: 10.1 %
NEUTROPHILS # BLD AUTO: 3692 CELLS/UL (ref 1500–7800)
NEUTROPHILS NFR BLD AUTO: 58.6 %
NONHDLC SERPL-MCNC: 100 MG/DL (CALC)
PLATELET # BLD AUTO: 152 THOUSAND/UL (ref 140–400)
PMV BLD REES-ECKER: 12.7 FL (ref 7.5–12.5)
POTASSIUM SERPL-SCNC: 4.9 MMOL/L (ref 3.5–5.3)
PROT SERPL-MCNC: 6.1 G/DL (ref 6.1–8.1)
RBC # BLD AUTO: 4.03 MILLION/UL (ref 3.8–5.1)
SODIUM SERPL-SCNC: 142 MMOL/L (ref 135–146)
TRIGL SERPL-MCNC: 132 MG/DL
TSH SERPL-ACNC: 0.9 MIU/L (ref 0.4–4.5)
WBC # BLD AUTO: 6.3 THOUSAND/UL (ref 3.8–10.8)

## 2023-03-20 DIAGNOSIS — D64.9 ANEMIA, UNSPECIFIED TYPE: Primary | ICD-10-CM

## 2023-03-22 ENCOUNTER — TELEPHONE (OUTPATIENT)
Dept: FAMILY MEDICINE CLINIC | Facility: CLINIC | Age: 63
End: 2023-03-22

## 2023-03-23 DIAGNOSIS — I83.813 VARICOSE VEINS OF BOTH LOWER EXTREMITIES WITH PAIN: Primary | ICD-10-CM

## 2023-03-23 NOTE — TELEPHONE ENCOUNTER
Patient has an appointment with Vascular sometime in April  They suggested patient to have imaging ordered for blood clot by her pcp  States it is painful w/compression stockings  She feels a small lump behind her right knee right above her calf muscle  She said she had forgotten to mention this at appointment

## 2023-03-24 LAB
FERRITIN SERPL-MCNC: 58 NG/ML (ref 16–288)
FOLATE SERPL-MCNC: 14.6 NG/ML
IRON SATN MFR SERPL: 21 % (CALC) (ref 16–45)
IRON SERPL-MCNC: 59 MCG/DL (ref 45–160)
TIBC SERPL-MCNC: 280 MCG/DL (CALC) (ref 250–450)
VIT B12 SERPL-MCNC: 758 PG/ML (ref 200–1100)

## 2023-03-27 DIAGNOSIS — I25.10 CORONARY ARTERY DISEASE INVOLVING NATIVE CORONARY ARTERY OF NATIVE HEART WITHOUT ANGINA PECTORIS: Primary | ICD-10-CM

## 2023-03-30 ENCOUNTER — HOSPITAL ENCOUNTER (OUTPATIENT)
Dept: VASCULAR ULTRASOUND | Facility: HOSPITAL | Age: 63
Discharge: HOME | End: 2023-03-30

## 2023-03-30 DIAGNOSIS — I83.813 VARICOSE VEINS OF BOTH LOWER EXTREMITIES WITH PAIN: ICD-10-CM

## 2023-06-08 DIAGNOSIS — E03.9 HYPOTHYROIDISM, UNSPECIFIED TYPE: ICD-10-CM

## 2023-06-08 RX ORDER — LEVOTHYROXINE SODIUM 175 UG/1
TABLET ORAL
Qty: 90 TABLET | Refills: 1 | Status: SHIPPED | OUTPATIENT
Start: 2023-06-08

## 2023-06-15 DIAGNOSIS — I10 ESSENTIAL HYPERTENSION: ICD-10-CM

## 2023-06-15 RX ORDER — CARVEDILOL 12.5 MG/1
TABLET ORAL
Qty: 180 TABLET | Refills: 1 | Status: SHIPPED | OUTPATIENT
Start: 2023-06-15

## 2023-06-15 RX ORDER — LISINOPRIL 20 MG/1
TABLET ORAL
Qty: 90 TABLET | Refills: 1 | Status: SHIPPED | OUTPATIENT
Start: 2023-06-15

## 2023-07-25 ENCOUNTER — OFFICE VISIT (OUTPATIENT)
Dept: FAMILY MEDICINE CLINIC | Facility: CLINIC | Age: 63
End: 2023-07-25
Payer: COMMERCIAL

## 2023-07-25 VITALS
WEIGHT: 281 LBS | BODY MASS INDEX: 45.16 KG/M2 | DIASTOLIC BLOOD PRESSURE: 76 MMHG | HEART RATE: 70 BPM | HEIGHT: 66 IN | OXYGEN SATURATION: 98 % | TEMPERATURE: 97.5 F | SYSTOLIC BLOOD PRESSURE: 114 MMHG

## 2023-07-25 DIAGNOSIS — E11.9 TYPE 2 DIABETES MELLITUS WITHOUT COMPLICATION, WITHOUT LONG-TERM CURRENT USE OF INSULIN (HCC): ICD-10-CM

## 2023-07-25 DIAGNOSIS — E78.5 HYPERLIPIDEMIA ASSOCIATED WITH TYPE 2 DIABETES MELLITUS (HCC): ICD-10-CM

## 2023-07-25 DIAGNOSIS — Z15.09 BRCA POSITIVE: ICD-10-CM

## 2023-07-25 DIAGNOSIS — E66.9 TYPE 2 DIABETES MELLITUS WITH OBESITY (HCC): Primary | ICD-10-CM

## 2023-07-25 DIAGNOSIS — I83.811 VARICOSE VEINS OF RIGHT LOWER EXTREMITY WITH PAIN: ICD-10-CM

## 2023-07-25 DIAGNOSIS — I25.10 CORONARY ARTERY DISEASE INVOLVING NATIVE CORONARY ARTERY OF NATIVE HEART WITHOUT ANGINA PECTORIS: ICD-10-CM

## 2023-07-25 DIAGNOSIS — E89.0 HYPOTHYROIDISM FOLLOWING RADIOIODINE THERAPY: ICD-10-CM

## 2023-07-25 DIAGNOSIS — Z15.01 BRCA POSITIVE: ICD-10-CM

## 2023-07-25 DIAGNOSIS — I15.2 HYPERTENSION COMPLICATING DIABETES (HCC): ICD-10-CM

## 2023-07-25 DIAGNOSIS — E03.9 HYPOTHYROIDISM, UNSPECIFIED TYPE: ICD-10-CM

## 2023-07-25 DIAGNOSIS — R42 LIGHTHEADEDNESS: ICD-10-CM

## 2023-07-25 DIAGNOSIS — E11.69 TYPE 2 DIABETES MELLITUS WITH OBESITY (HCC): Primary | ICD-10-CM

## 2023-07-25 DIAGNOSIS — E11.69 HYPERLIPIDEMIA ASSOCIATED WITH TYPE 2 DIABETES MELLITUS (HCC): ICD-10-CM

## 2023-07-25 DIAGNOSIS — E11.59 HYPERTENSION COMPLICATING DIABETES (HCC): ICD-10-CM

## 2023-07-25 LAB — SL AMB POCT HEMOGLOBIN AIC: 6.5 (ref ?–6.5)

## 2023-07-25 PROCEDURE — 99214 OFFICE O/P EST MOD 30 MIN: CPT | Performed by: PHYSICIAN ASSISTANT

## 2023-07-25 PROCEDURE — 83036 HEMOGLOBIN GLYCOSYLATED A1C: CPT | Performed by: PHYSICIAN ASSISTANT

## 2023-07-25 NOTE — PROGRESS NOTES
Name: Paresh Leonard      : 1960      MRN: [id number]  Encounter Provider: Nasrin Bean PA-C  Encounter Date: 2023   Encounter department: [address] [place] [address]     1. Type 2 diabetes mellitus with obesity (HCC)  -     Albumin / creatinine urine ratio; Future; Expected date: 10/25/2023  -     Comprehensive metabolic panel; Future; Expected date: 10/25/2023  -     Hemoglobin A1C; Future; Expected date: 10/25/2023  -     CBC and differential; Future; Expected date: 10/25/2023    2. Type 2 diabetes mellitus without complication, without long-term current use of insulin (HCC)  -     POCT hemoglobin A1c    3. Hypothyroidism, unspecified type  -     TSH, 3rd generation with Free T4 reflex; Future    4. Hypertension complicating diabetes ([address])    5. Hyperlipidemia associated with type 2 diabetes mellitus ([address])  -     Lipid Panel with Direct LDL reflex; Future; Expected date: 10/25/2023    6. Hypothyroidism following radioiodine therapy    7. Coronary artery disease involving native coronary artery of native heart without angina pectoris  -     CBC and differential; Future; Expected date: 10/25/2023  -     Stress test only, exercise; Future; Expected date: 2023    8. BRCA positive    9. Varicose veins of right lower extremity with pain  -     Ambulatory Referral to Vascular Surgery; Future    10. Lightheadedness  -     VAS carotid complete study; Future; Expected date: 2023    dm well controlled, continue metformin. BP at goal continue lisinopril and coreg. Due for labs. Discussed importance of mammography given BRCA+ statin. Recommend stress testing, return to cardiology. Will seek US carotids given her lightheadedness. Normal cardiac exam today, VSS. Stay hydrated. Return to vascular, continue compression, low salt diet, weight loss. Check TSH. 3 month follow up, earlier prn       Subjective     DM: a1c stable at 6.5. on metformin.  On ace, statin  HLD: due for FLP, historically well controlled on simvastatin ezitimbe  HTN: on lisinopril, coreg, /76  CAD: Hx of stenting to the LAD, on exertional CP, no SOB. On ASA, statin, coreg. Normal stress test apparently in 2020. She follows with cardiology, due for follow up, a stress test was ordered 2 years ago that wasn't done. She notes lightheadedness with prolonged standing at time. No exertional chest pain, SOB. No syncope hx. Hypothyroid: on levothyroxine 175mcg, due for TSH  Due for mammography, BRCA+  She has known varicose veins with pain, she is due for vascular follow up, she is wearing compression stockings. Review of Systems   Constitutional: Negative for chills, fatigue and fever. HENT: Negative for congestion, ear pain, hearing loss, nosebleeds, postnasal drip, rhinorrhea, sinus pressure, sinus pain, sneezing and sore throat. Eyes: Negative for pain, discharge, itching and visual disturbance. Respiratory: Negative for cough, chest tightness, shortness of breath and wheezing. Cardiovascular: Positive for leg swelling. Negative for chest pain and palpitations. Gastrointestinal: Negative for abdominal pain, blood in stool, constipation, diarrhea, nausea and vomiting. Genitourinary: Negative for frequency and urgency. Neurological: Positive for light-headedness. Negative for dizziness and numbness.        Past Medical History:   Diagnosis Date   • Diabetes mellitus ([address])    • Disease of thyroid gland    • Hypertension      Past Surgical History:   Procedure Laterality Date   • CARDIAC SURGERY       Family History   Problem Relation Age of Onset   • Cervical cancer Mother         hysterectomy   • Breast cancer Maternal Aunt         masectomy   • Breast cancer Maternal Aunt         found lump in nipple   • No Known Problems Sister    • Diabetes Daughter    • No Known Problems Maternal Grandmother    • Prostate cancer Maternal Grandfather    • Heart disease Paternal Aunt    • Heart disease Paternal Aunt      Social History     Socioeconomic History   • Marital status: /Civil Union     Spouse name: None   • Number of children: None   • Years of education: None   • Highest education level: None   Occupational History   • None   Tobacco Use   • Smoking status: Never   • Smokeless tobacco: Never   Vaping Use   • Vaping Use: Never used   Substance and Sexual Activity   • Alcohol use: Never   • Drug use: Never   • Sexual activity: Not Currently     Partners: Male     Birth control/protection: Post-menopausal   Other Topics Concern   • None   Social History Narrative   • None     Social Determinants of Health     Financial Resource Strain: Not on file   Food Insecurity: Not on file   Transportation Needs: Not on file   Physical Activity: Not on file   Stress: Not on file   Social Connections: Not on file   Intimate Partner Violence: Not on file   Housing Stability: Not on file     Current Outpatient Medications on File Prior to Visit   Medication Sig   • Aspirin Low Dose 81 MG EC tablet TAKE 1 TABLET BY MOUTH EVERY DAY   • carvedilol (COREG) 12.5 mg tablet TAKE 1 TABLET BY MOUTH TWICE A DAY   • ezetimibe-simvastatin (VYTORIN) 10-40 mg per tablet TAKE 1 TABLET BY MOUTH EVERYDAY AT BEDTIME   • latanoprost (XALATAN) 0.005 % ophthalmic solution USE 1 DROP IN EACH EYE AT BEDTIME   • levothyroxine 175 mcg tablet TAKES 7 & 1/2 TABLETS BY MOUTH PER WEEK.  ONE DAILY AND ON SUNDAY 1.5 TABLET   • lisinopril (ZESTRIL) 20 mg tablet TAKE 1 TABLET BY MOUTH EVERY DAY   • metFORMIN (GLUCOPHAGE) 1000 MG tablet TAKE 1 TABLET BY MOUTH TWICE A DAY WITH MEALS     Allergies   Allergen Reactions   • Kiwi Extract - Food Allergy Anaphylaxis   • Shrimp (Diagnostic) - Food Allergy Anaphylaxis   • Shellfish-Derived Products - Food Allergy Rash     All Seafood    • Latex Hives     Immunization History   Administered Date(s) Administered   • COVID-19 MODERNA VACC 0.5 ML IM 07/05/2021, 08/07/2021   • INFLUENZA 08/30/2022   • Influenza, injectable, quadrivalent, preservative free 0.5 mL 08/30/2022   • Influenza, recombinant, quadrivalent,injectable, preservative free 10/04/2021       Objective     /76   Pulse 70   Temp 97.5 °F (36.4 °C)   Ht 5' 6" (1.676 m)   Wt 127 kg (281 lb)   LMP 09/17/1999 (Exact Date)   SpO2 98%   BMI 45.35 kg/m²     Physical Exam  Vitals and nursing note reviewed. Constitutional:       General: She is not in acute distress. Appearance: Normal appearance. HENT:      Head: Normocephalic and atraumatic. Nose: Nose normal.      Mouth/Throat:      Mouth: Mucous membranes are moist.      Pharynx: Oropharynx is clear. No oropharyngeal exudate or posterior oropharyngeal erythema. Eyes:      Pupils: Pupils are equal, round, and reactive to light. Cardiovascular:      Rate and Rhythm: Normal rate and regular rhythm. Pulses: no weak pulses          Dorsalis pedis pulses are 2+ on the right side and 2+ on the left side. Posterior tibial pulses are 2+ on the right side and 2+ on the left side. Heart sounds: Normal heart sounds. No murmur heard. Pulmonary:      Effort: Pulmonary effort is normal. No respiratory distress. Breath sounds: Normal breath sounds. No wheezing, rhonchi or rales. Musculoskeletal:         General: Normal range of motion. Cervical back: Normal range of motion and neck supple. Right lower leg: No edema. Left lower leg: No edema. Feet:      Right foot:      Skin integrity: No ulcer, skin breakdown, erythema, warmth, callus or dry skin. Left foot:      Skin integrity: No ulcer, skin breakdown, erythema, warmth, callus or dry skin. Skin:     General: Skin is warm and dry. Neurological:      Mental Status: She is alert and oriented to person, place, and time. Psychiatric:         Mood and Affect: Mood and affect normal.            Diabetic Foot Exam    Patient's shoes and socks removed.     Right Foot/Ankle   Right Foot Inspection  Skin Exam: skin normal and skin intact. No dry skin, no warmth, no callus, no erythema, no maceration, no abnormal color, no pre-ulcer, no ulcer and no callus. Toe Exam: ROM and strength within normal limits. Sensory   Vibration: intact  Proprioception: intact  Monofilament testing: intact    Vascular  Capillary refills: < 3 seconds  The right DP pulse is 2+. The right PT pulse is 2+. Left Foot/Ankle  Left Foot Inspection  Skin Exam: skin normal and skin intact. No dry skin, no warmth, no erythema, no maceration, normal color, no pre-ulcer, no ulcer and no callus. Toe Exam: ROM and strength within normal limits. Sensory   Vibration: intact  Proprioception: intact  Monofilament testing: intact    Vascular  Capillary refills: < 3 seconds  The left DP pulse is 2+. The left PT pulse is 2+.      Assign Risk Category  No deformity present  No loss of protective sensation  No weak pulses  Risk: 0    River Shukla PA-C

## 2023-07-26 LAB
ALBUMIN SERPL-MCNC: 4.1 G/DL (ref 3.6–5.1)
ALBUMIN/GLOB SERPL: 1.6 (CALC) (ref 1–2.5)
ALP SERPL-CCNC: 86 U/L (ref 37–153)
ALT SERPL-CCNC: 19 U/L (ref 6–29)
AST SERPL-CCNC: 15 U/L (ref 10–35)
BASOPHILS # BLD AUTO: 32 CELLS/UL (ref 0–200)
BASOPHILS NFR BLD AUTO: 0.5 %
BILIRUB SERPL-MCNC: 0.3 MG/DL (ref 0.2–1.2)
BUN SERPL-MCNC: 16 MG/DL (ref 7–25)
BUN/CREAT SERPL: ABNORMAL (CALC) (ref 6–22)
CALCIUM SERPL-MCNC: 9.6 MG/DL (ref 8.6–10.4)
CHLORIDE SERPL-SCNC: 106 MMOL/L (ref 98–110)
CHOLEST SERPL-MCNC: 151 MG/DL
CHOLEST/HDLC SERPL: 2.7 (CALC)
CO2 SERPL-SCNC: 29 MMOL/L (ref 20–32)
CREAT SERPL-MCNC: 0.61 MG/DL (ref 0.5–1.05)
EOSINOPHIL # BLD AUTO: 120 CELLS/UL (ref 15–500)
EOSINOPHIL NFR BLD AUTO: 1.9 %
ERYTHROCYTE [DISTWIDTH] IN BLOOD BY AUTOMATED COUNT: 14.6 % (ref 11–15)
GFR/BSA.PRED SERPLBLD CYS-BASED-ARV: 102 ML/MIN/1.73M2
GLOBULIN SER CALC-MCNC: 2.5 G/DL (CALC) (ref 1.9–3.7)
GLUCOSE SERPL-MCNC: 106 MG/DL (ref 65–99)
HBA1C MFR BLD: 6.3 % OF TOTAL HGB
HCT VFR BLD AUTO: 37.8 % (ref 35–45)
HDLC SERPL-MCNC: 56 MG/DL
HGB BLD-MCNC: 12.1 G/DL (ref 11.7–15.5)
LDLC SERPL CALC-MCNC: 75 MG/DL (CALC)
LYMPHOCYTES # BLD AUTO: 1808 CELLS/UL (ref 850–3900)
LYMPHOCYTES NFR BLD AUTO: 28.7 %
MCH RBC QN AUTO: 28.7 PG (ref 27–33)
MCHC RBC AUTO-ENTMCNC: 32 G/DL (ref 32–36)
MCV RBC AUTO: 89.8 FL (ref 80–100)
MONOCYTES # BLD AUTO: 662 CELLS/UL (ref 200–950)
MONOCYTES NFR BLD AUTO: 10.5 %
NEUTROPHILS # BLD AUTO: 3679 CELLS/UL (ref 1500–7800)
NEUTROPHILS NFR BLD AUTO: 58.4 %
NONHDLC SERPL-MCNC: 95 MG/DL (CALC)
PLATELET # BLD AUTO: 153 THOUSAND/UL (ref 140–400)
PMV BLD REES-ECKER: 12.6 FL (ref 7.5–12.5)
POTASSIUM SERPL-SCNC: 5.1 MMOL/L (ref 3.5–5.3)
PROT SERPL-MCNC: 6.6 G/DL (ref 6.1–8.1)
RBC # BLD AUTO: 4.21 MILLION/UL (ref 3.8–5.1)
SODIUM SERPL-SCNC: 142 MMOL/L (ref 135–146)
TRIGL SERPL-MCNC: 115 MG/DL
TSH SERPL-ACNC: 0.55 MIU/L (ref 0.4–4.5)
WBC # BLD AUTO: 6.3 THOUSAND/UL (ref 3.8–10.8)

## 2023-07-27 LAB
ALBUMIN/CREAT UR: 7 MCG/MG CREAT
CREAT UR-MCNC: 81 MG/DL (ref 20–275)
MICROALBUMIN UR-MCNC: 0.6 MG/DL

## 2023-08-07 ENCOUNTER — OFFICE VISIT (OUTPATIENT)
Dept: CARDIOLOGY CLINIC | Facility: CLINIC | Age: 63
End: 2023-08-07
Payer: COMMERCIAL

## 2023-08-07 VITALS
WEIGHT: 285 LBS | OXYGEN SATURATION: 99 % | RESPIRATION RATE: 16 BRPM | HEIGHT: 66 IN | SYSTOLIC BLOOD PRESSURE: 142 MMHG | BODY MASS INDEX: 45.8 KG/M2 | DIASTOLIC BLOOD PRESSURE: 80 MMHG | HEART RATE: 84 BPM

## 2023-08-07 DIAGNOSIS — I25.10 CORONARY ARTERY DISEASE INVOLVING NATIVE CORONARY ARTERY OF NATIVE HEART WITHOUT ANGINA PECTORIS: ICD-10-CM

## 2023-08-07 DIAGNOSIS — I87.2 VENOUS (PERIPHERAL) INSUFFICIENCY: ICD-10-CM

## 2023-08-07 DIAGNOSIS — I20.8 ANGINAL EQUIVALENT (HCC): Primary | ICD-10-CM

## 2023-08-07 PROBLEM — I20.89 ANGINAL EQUIVALENT: Status: ACTIVE | Noted: 2023-08-07

## 2023-08-07 PROCEDURE — 99213 OFFICE O/P EST LOW 20 MIN: CPT | Performed by: INTERNAL MEDICINE

## 2023-08-07 RX ORDER — HYDROCHLOROTHIAZIDE 12.5 MG/1
12.5 TABLET ORAL DAILY
Qty: 30 TABLET | Refills: 1 | Status: SHIPPED | OUTPATIENT
Start: 2023-08-07

## 2023-08-07 NOTE — PROGRESS NOTES
Cardiology Follow Up    Tong Bolaños  1960  [id number]  [facility] Bubba Mcclendon [address] [place] [address]  [place] [id number] [phone]    1. Anginal equivalent (HCC)  -     hydrochlorothiazide (HYDRODIURIL) 12.5 mg tablet; Take 1 tablet (12.5 mg total) by mouth daily  -     NM myocardial perfusion spect (rx stress and/or rest); Future; Expected date: 08/07/2023  -     Basic metabolic panel; Future; Expected date: 08/21/2023    2. Venous (peripheral) insufficiency  -     Ambulatory referral to PT/OT lymphedema therapy; Future    3. Coronary artery disease involving native coronary artery of native heart without angina pectoris          Interval History: 70-year-old lady with morbid obesity and previous stenting of the LAD. At that time she had a "terrible pain" in her chest.  She has not had that since. She does not smoke. LDL cholesterol is under control. She developed discomfort in the right calf. Venous study showed superficial thrombophlebitis. There was no evidence of deep vein thrombophlebitis.     Patient Active Problem List   Diagnosis   • Coronary artery disease involving native coronary artery of native heart without angina pectoris   • Presence of stent in LAD coronary artery   • Hypothyroidism   • Hypertension complicating diabetes ([address])   • Mixed hyperlipidemia   • Type 2 diabetes mellitus without complication, without long-term current use of insulin (HCC)   • NOLSACO (dyspnea on exertion)   • Class 3 severe obesity due to excess calories with serious comorbidity and body mass index (BMI) of 45.0 to 49.9 in adult [facility])   • Varicose veins of both lower extremities with pain   • BRCA positive   • History of anemia   • Hyperlipidemia associated with type 2 diabetes mellitus (HCC)   • Type 2 diabetes mellitus with obesity (HCC)   • Primary osteoarthritis of both knees   • Personal history of colonic polyps   • Family history of colon cancer   • Ambulates with cane   • Uterine leiomyoma   • Thrombocytopenia ([address])   • Hypothyroidism following radioiodine therapy   • Status post coronary artery stent placement   • Body aches   • Venous (peripheral) insufficiency   • Anginal equivalent (HCC)     Past Medical History:   Diagnosis Date   • Diabetes mellitus ([address])    • Disease of thyroid gland    • Hypertension      Social History     Socioeconomic History   • Marital status: /Civil Union     Spouse name: Not on file   • Number of children: Not on file   • Years of education: Not on file   • Highest education level: Not on file   Occupational History   • Not on file   Tobacco Use   • Smoking status: Never   • Smokeless tobacco: Never   Vaping Use   • Vaping Use: Never used   Substance and Sexual Activity   • Alcohol use: Never   • Drug use: Never   • Sexual activity: Not Currently     Partners: Male     Birth control/protection: Post-menopausal   Other Topics Concern   • Not on file   Social History Narrative   • Not on file     Social Determinants of Health     Financial Resource Strain: Not on file   Food Insecurity: Not on file   Transportation Needs: Not on file   Physical Activity: Not on file   Stress: Not on file   Social Connections: Not on file   Intimate Partner Violence: Not on file   Housing Stability: Not on file      Family History   Problem Relation Age of Onset   • Cervical cancer Mother         hysterectomy   • Breast cancer Maternal Aunt         masectomy   • Breast cancer Maternal Aunt         found lump in nipple   • No Known Problems Sister    • Diabetes Daughter    • No Known Problems Maternal Grandmother    • Prostate cancer Maternal Grandfather    • Heart disease Paternal Aunt    • Heart disease Paternal Aunt      Past Surgical History:   Procedure Laterality Date   • CARDIAC SURGERY         Current Outpatient Medications:   •  Aspirin Low Dose 81 MG EC tablet, TAKE 1 TABLET BY MOUTH EVERY DAY, Disp: 90 tablet, Rfl: 1  •  carvedilol (COREG) 12.5 mg tablet, TAKE 1 TABLET BY MOUTH TWICE A DAY, Disp: 180 tablet, Rfl: 1  •  ezetimibe-simvastatin (VYTORIN) 10-40 mg per tablet, TAKE 1 TABLET BY MOUTH EVERYDAY AT BEDTIME, Disp: 90 tablet, Rfl: 1  •  hydrochlorothiazide (HYDRODIURIL) 12.5 mg tablet, Take 1 tablet (12.5 mg total) by mouth daily, Disp: 30 tablet, Rfl: 1  •  latanoprost (XALATAN) 0.005 % ophthalmic solution, USE 1 DROP IN EACH EYE AT BEDTIME, Disp: , Rfl:   •  levothyroxine 175 mcg tablet, TAKES 7 & 1/2 TABLETS BY MOUTH PER WEEK.  ONE DAILY AND ON SUNDAY 1.5 TABLET, Disp: 90 tablet, Rfl: 1  •  lisinopril (ZESTRIL) 20 mg tablet, TAKE 1 TABLET BY MOUTH EVERY DAY, Disp: 90 tablet, Rfl: 1  •  metFORMIN (GLUCOPHAGE) 1000 MG tablet, TAKE 1 TABLET BY MOUTH TWICE A DAY WITH MEALS, Disp: 180 tablet, Rfl: 1  Allergies   Allergen Reactions   • Kiwi Extract - Food Allergy Anaphylaxis   • Shrimp (Diagnostic) - Food Allergy Anaphylaxis   • Shellfish-Derived Products - Food Allergy Rash     All Seafood    • Latex Hives       Labs:  Orders Only on 07/25/2023   Component Date Value   • Total Cholesterol 07/25/2023 151    • HDL 07/25/2023 56    • Triglycerides 07/25/2023 115    • LDL Calculated 07/25/2023 75    • Chol HDLC Ratio 07/25/2023 2.7    • Non-HDL Cholesterol 07/25/2023 95    • Glucose, Random 07/25/2023 106 (H)    • BUN 07/25/2023 16    • Creatinine 07/25/2023 0.61    • eGFR 07/25/2023 102    • SL AMB BUN/CREATININE RA* 13/45/3500 NOT APPLICABLE    • Sodium 41/81/3915 142    • Potassium 07/25/2023 5.1    • Chloride 07/25/2023 106    • CO2 07/25/2023 29    • Calcium 07/25/2023 9.6    • Protein, Total 07/25/2023 6.6    • Albumin 07/25/2023 4.1    • Globulin 07/25/2023 2.5    • Albumin/Globulin Ratio 07/25/2023 1.6    • TOTAL BILIRUBIN 07/25/2023 0.3    • Alkaline Phosphatase 07/25/2023 86    • AST 07/25/2023 15    • ALT 07/25/2023 19    • White Blood Cell Count 07/25/2023 6.3    • Red Blood Cell Count 07/25/2023 4.21    • Hemoglobin 07/25/2023 12.1    • HCT 07/25/2023 37.8    • MCV 07/25/2023 89.8    • MCH 07/25/2023 28.7    • MCHC 07/25/2023 32.0    • RDW 07/25/2023 14.6    • Platelet Count 46/48/2972 153    • SL AMB MPV 07/25/2023 12.6 (H)    • Neutrophils (Absolute) 07/25/2023 3,679    • Lymphocytes (Absolute) 07/25/2023 1,808    • Monocytes (Absolute) 07/25/2023 662    • Eosinophils (Absolute) 07/25/2023 120    • Basophils ABS 07/25/2023 32    • Neutrophils 07/25/2023 58.4    • Lymphocytes 07/25/2023 28.7    • Monocytes 07/25/2023 10.5    • Eosinophils 07/25/2023 1.9    • Basophils PCT 07/25/2023 0.5    • TSH W/RFX TO FREE T4 07/25/2023 0.55    • Hemoglobin A1C 07/25/2023 6.3 (H)    • Creatinine, Urine 07/26/2023 81    • Albumin,U,Random 07/26/2023 0.6    • Microalb/Creat Ratio 07/26/2023 7    Office Visit on 07/25/2023   Component Date Value   • Hemoglobin A1C 07/25/2023 6.5      Imaging: No results found. Review of Systems:  Review of Systems    Physical Exam:  Morbidly obese. Blood pressure 142/80. Heart rate 84 and right. Lungs clear. Regular. No murmur signs of chronic venous insufficiency with some calf tenderness 2+ edema    Discussion/Summary:    1. Coronary artery disease-no apparent angina pectoris  2. Venous insufficiency and edema with superficial phlebitis    Recommendations:    1. Support stockings  2. Keep leg elevated  3. Referral to lymphedema clinic  4. Start hydrochlorothiazide 12.5 mg daily and check potassium level in 2 weeks  5. Stress test pending  6.   Follow-up afterwards      Suzette Wills MD

## 2023-08-21 ENCOUNTER — HOSPITAL ENCOUNTER (OUTPATIENT)
Dept: NON INVASIVE DIAGNOSTICS | Facility: CLINIC | Age: 63
Discharge: HOME | End: 2023-08-21
Payer: COMMERCIAL

## 2023-08-21 ENCOUNTER — HOSPITAL ENCOUNTER (OUTPATIENT)
Dept: VASCULAR ULTRASOUND | Facility: HOSPITAL | Age: 63
Discharge: HOME | End: 2023-08-21
Payer: COMMERCIAL

## 2023-08-21 VITALS
HEART RATE: 71 BPM | WEIGHT: 285 LBS | SYSTOLIC BLOOD PRESSURE: 172 MMHG | BODY MASS INDEX: 45.8 KG/M2 | HEIGHT: 66 IN | DIASTOLIC BLOOD PRESSURE: 92 MMHG | OXYGEN SATURATION: 98 %

## 2023-08-21 DIAGNOSIS — R42 LIGHTHEADEDNESS: ICD-10-CM

## 2023-08-21 DIAGNOSIS — I20.8 ANGINAL EQUIVALENT (HCC): ICD-10-CM

## 2023-08-21 LAB
NUC STRESS DIASTOLIC VOLUME INDEX: 78 ML/M2
NUC STRESS EJECTION FRACTION: 70 %
NUC STRESS SYSTOLIC VOLUME INDEX: 18 ML/M2
RATE PRESSURE PRODUCT: NORMAL
SL CV REST NUCLEAR ISOTOPE DOSE: 15.76 MCI
SL CV STRESS NUCLEAR ISOTOPE DOSE: 47.3 MCI
SL CV STRESS RECOVERY BP: NORMAL MMHG
SL CV STRESS RECOVERY HR: 91 BPM
SL CV STRESS RECOVERY O2 SAT: 100 %
STRESS ANGINA INDEX: 0
STRESS BASELINE BP: NORMAL MMHG
STRESS BASELINE HR: 71 BPM
STRESS O2 SAT REST: 98 %
STRESS PEAK HR: 117 BPM
STRESS POST O2 SAT PEAK: 99 %
STRESS POST PEAK BP: 188 MMHG
STRESS/REST PERFUSION RATIO: 0.97

## 2023-08-21 PROCEDURE — 78452 HT MUSCLE IMAGE SPECT MULT: CPT

## 2023-08-21 PROCEDURE — A9502 TC99M TETROFOSMIN: HCPCS

## 2023-08-21 PROCEDURE — 93016 CV STRESS TEST SUPVJ ONLY: CPT | Performed by: INTERNAL MEDICINE

## 2023-08-21 PROCEDURE — G1004 CDSM NDSC: HCPCS

## 2023-08-21 PROCEDURE — 93018 CV STRESS TEST I&R ONLY: CPT | Performed by: INTERNAL MEDICINE

## 2023-08-21 PROCEDURE — 93017 CV STRESS TEST TRACING ONLY: CPT

## 2023-08-21 PROCEDURE — 93880 EXTRACRANIAL BILAT STUDY: CPT

## 2023-08-21 PROCEDURE — 78452 HT MUSCLE IMAGE SPECT MULT: CPT | Performed by: INTERNAL MEDICINE

## 2023-08-21 RX ORDER — REGADENOSON 0.08 MG/ML
0.4 INJECTION, SOLUTION INTRAVENOUS ONCE
Status: COMPLETED | OUTPATIENT
Start: 2023-08-21 | End: 2023-08-21

## 2023-08-21 RX ADMIN — REGADENOSON 0.4 MG: 0.08 INJECTION, SOLUTION INTRAVENOUS at 13:04

## 2023-08-22 LAB
CHEST PAIN STATEMENT: NORMAL
MAX DIASTOLIC BP: 76 MMHG
MAX HEART RATE: 117 BPM
MAX PREDICTED HEART RATE: 159 BPM
MAX. SYSTOLIC BP: 188 MMHG
PROTOCOL NAME: NORMAL
REASON FOR TERMINATION: NORMAL
TARGET HR FORMULA: NORMAL
TEST INDICATION: NORMAL
TIME IN EXERCISE PHASE: NORMAL

## 2023-08-22 PROCEDURE — 93880 EXTRACRANIAL BILAT STUDY: CPT | Performed by: SURGERY

## 2023-08-24 ENCOUNTER — HOSPITAL ENCOUNTER (OUTPATIENT)
Age: 63
Discharge: HOME | End: 2023-08-24
Payer: COMMERCIAL

## 2023-08-24 VITALS — WEIGHT: 285 LBS | HEIGHT: 66 IN | BODY MASS INDEX: 45.8 KG/M2

## 2023-08-24 DIAGNOSIS — Z15.01 BRCA POSITIVE: ICD-10-CM

## 2023-08-24 DIAGNOSIS — Z15.09 BRCA POSITIVE: ICD-10-CM

## 2023-08-24 DIAGNOSIS — Z12.31 ENCOUNTER FOR SCREENING MAMMOGRAM FOR MALIGNANT NEOPLASM OF BREAST: ICD-10-CM

## 2023-08-24 PROCEDURE — 77063 BREAST TOMOSYNTHESIS BI: CPT

## 2023-08-24 PROCEDURE — 77067 SCR MAMMO BI INCL CAD: CPT

## 2023-08-25 ENCOUNTER — HOSPITAL ENCOUNTER (OUTPATIENT)
Dept: NON INVASIVE DIAGNOSTICS | Facility: CLINIC | Age: 63
Discharge: HOME | End: 2023-08-25
Payer: COMMERCIAL

## 2023-08-25 DIAGNOSIS — I83.813 VARICOSE VEINS OF BOTH LOWER EXTREMITIES WITH PAIN: ICD-10-CM

## 2023-08-25 PROCEDURE — 93971 EXTREMITY STUDY: CPT

## 2023-08-25 PROCEDURE — 93971 EXTREMITY STUDY: CPT | Performed by: SURGERY

## 2023-08-31 DIAGNOSIS — I20.8 ANGINAL EQUIVALENT: ICD-10-CM

## 2023-08-31 RX ORDER — HYDROCHLOROTHIAZIDE 12.5 MG/1
12.5 TABLET ORAL DAILY
Qty: 90 TABLET | Refills: 3 | Status: SHIPPED | OUTPATIENT
Start: 2023-08-31

## 2023-09-05 ENCOUNTER — OFFICE VISIT (OUTPATIENT)
Dept: URGENT CARE | Facility: CLINIC | Age: 63
End: 2023-09-05
Payer: COMMERCIAL

## 2023-09-05 ENCOUNTER — APPOINTMENT (OUTPATIENT)
Dept: RADIOLOGY | Facility: CLINIC | Age: 63
End: 2023-09-05
Payer: COMMERCIAL

## 2023-09-05 ENCOUNTER — TELEPHONE (OUTPATIENT)
Dept: FAMILY MEDICINE CLINIC | Facility: CLINIC | Age: 63
End: 2023-09-05

## 2023-09-05 VITALS
WEIGHT: 282.38 LBS | SYSTOLIC BLOOD PRESSURE: 140 MMHG | HEART RATE: 95 BPM | DIASTOLIC BLOOD PRESSURE: 72 MMHG | OXYGEN SATURATION: 98 % | TEMPERATURE: 98.2 F | RESPIRATION RATE: 20 BRPM | BODY MASS INDEX: 45.58 KG/M2

## 2023-09-05 DIAGNOSIS — R05.1 ACUTE COUGH: ICD-10-CM

## 2023-09-05 DIAGNOSIS — U07.1 COVID-19: Primary | ICD-10-CM

## 2023-09-05 LAB
SARS-COV-2 AG UPPER RESP QL IA: POSITIVE
VALID CONTROL: ABNORMAL

## 2023-09-05 PROCEDURE — 87811 SARS-COV-2 COVID19 W/OPTIC: CPT

## 2023-09-05 PROCEDURE — 71046 X-RAY EXAM CHEST 2 VIEWS: CPT

## 2023-09-05 PROCEDURE — 99213 OFFICE O/P EST LOW 20 MIN: CPT

## 2023-09-05 RX ORDER — BENZONATATE 100 MG/1
100 CAPSULE ORAL 3 TIMES DAILY PRN
Qty: 20 CAPSULE | Refills: 0 | Status: SHIPPED | OUTPATIENT
Start: 2023-09-05

## 2023-09-05 RX ORDER — NIRMATRELVIR AND RITONAVIR 300-100 MG
3 KIT ORAL 2 TIMES DAILY
Qty: 30 TABLET | Refills: 0 | Status: SHIPPED | OUTPATIENT
Start: 2023-09-05 | End: 2023-09-10

## 2023-09-05 RX ORDER — FLUTICASONE PROPIONATE 50 MCG
1 SPRAY, SUSPENSION (ML) NASAL DAILY
Qty: 9.9 ML | Refills: 0 | Status: SHIPPED | OUTPATIENT
Start: 2023-09-05

## 2023-09-05 RX ORDER — ALBUTEROL SULFATE 90 UG/1
2 AEROSOL, METERED RESPIRATORY (INHALATION) EVERY 6 HOURS PRN
Qty: 8.5 G | Refills: 0 | Status: SHIPPED | OUTPATIENT
Start: 2023-09-05

## 2023-09-05 NOTE — PATIENT INSTRUCTIONS
COVID positive in clinic today. Symptoms may linger for weeks, your contagious period is the first 5-7 days from symptom onset. Take paxlovid as directed for next 5 days and use flonase up to twice daily with nasal saline for congestion, albuterol inhaler as needed for shortness of breath, and tessalon perles up to 3 times a day as needed for cough. Hold ezetemibe-simvastatin for the next 10 days while taking Paxlovid. Ensure adequate hydration and vitamin supplementation with Vitamin C, Vitamin D, Magnesium, and Zinc. Follow-up with PCP in 3-5 days if no improvement of symptoms. Report to the ER if symptoms worsen. cough

## 2023-09-05 NOTE — PROGRESS NOTES
[facility]        NAME: Elroy Riedel is a 65 y.o. female  : 1960    MRN: [id number]  DATE: 2023  TIME: 11:30 AM    Assessment and Plan   COVID-19 [U07.1]  1. COVID-19  nirmatrelvir & ritonavir (Paxlovid, 300/100,) tablet therapy pack    benzonatate (TESSALON PERLES) 100 mg capsule    fluticasone (FLONASE) 50 mcg/act nasal spray      2. Acute cough  Covid/Flu-Office Collect    XR chest pa & lateral    Poct Covid 19 Rapid Antigen Test        COVID positive in clinic. Sp02 level maintained at 98% with ambulation, max . Denies SOB, chest pain, or dizziness with ambulation. No atelectasis or pnuemonia but questionable opacities on left side of chest per provider read. No prior chest x-ray available for comparison. Will follow-up with final read by radiologist if findings are significant. Will start on Paxlovid. Advised to hold statin for next 10 days while taking medication. Tessalon perles. Proventil, and flonase prescribed for additional symptom relief. Advised close follow-up with PCP or to report to the ER if symptoms worsen/develop shortness of breath. Patient verbalizes understanding and agreeable to plan. Patient Instructions     COVID positive in clinic today. Symptoms may linger for weeks, your contagious period is the first 5-7 days from symptom onset. Take paxlovid as directed for next 5 days and use flonase up to twice daily with nasal saline for congestion, albuterol inhaler as needed for shortness of breath, and tessalon perles up to 3 times a day as needed for cough. Hold ezetemibe-simvastatin for the next 10 days while taking Paxlovid. Ensure adequate hydration and vitamin supplementation with Vitamin C, Vitamin D, Magnesium, and Zinc. Follow-up with PCP in 3-5 days if no improvement of symptoms. Report to the ER if symptoms worsen.      Chief Complaint     Chief Complaint   Patient presents with   • URI     Started yesterday with chills, stuffy nose, right ear pain, coughing, green phlegm. Lung pain. Fever yesterday 101-102. Taking ibuprofen. Body aches. Observed SOB on min/mod exertion. History of Present Illness       65year old female presents for evaluation of cough, congestion, and fatigue that started yesterday. She denies any known sick contacts or triggers. She relates she had a fever of 101-102 but took ibuprofen and reports occasional productive sputum. She denies associated chest pain or shortness of breath. She has not taken any other medications for symptoms. URI   This is a new problem. The current episode started yesterday. The problem has been unchanged. There has been no fever. Associated symptoms include congestion, coughing, ear pain, headaches, joint pain, rhinorrhea, a sore throat and swollen glands. Pertinent negatives include no abdominal pain, chest pain, diarrhea, dysuria, joint swelling, nausea, neck pain, plugged ear sensation, rash, sinus pain, sneezing, vomiting or wheezing. She has tried NSAIDs and increased fluids for the symptoms. The treatment provided mild relief. Review of Systems   Review of Systems   Constitutional: Positive for activity change, chills, fatigue and fever. Negative for appetite change. HENT: Positive for congestion, ear pain, postnasal drip, rhinorrhea and sore throat. Negative for sinus pressure, sinus pain and sneezing. Respiratory: Positive for cough. Negative for chest tightness, shortness of breath and wheezing. Cardiovascular: Negative for chest pain and palpitations. Gastrointestinal: Negative for abdominal pain, constipation, diarrhea, nausea and vomiting. Genitourinary: Negative for dysuria. Musculoskeletal: Positive for joint pain and myalgias. Negative for arthralgias, back pain and neck pain. Skin: Negative for color change and rash. Allergic/Immunologic: Negative for environmental allergies and food allergies. Neurological: Positive for headaches.  Negative for dizziness and light-headedness. Current Medications       Current Outpatient Medications:   •  Aspirin Low Dose 81 MG EC tablet, TAKE 1 TABLET BY MOUTH EVERY DAY, Disp: 90 tablet, Rfl: 1  •  benzonatate (TESSALON PERLES) 100 mg capsule, Take 1 capsule (100 mg total) by mouth 3 (three) times a day as needed for cough, Disp: 20 capsule, Rfl: 0  •  carvedilol (COREG) 12.5 mg tablet, TAKE 1 TABLET BY MOUTH TWICE A DAY, Disp: 180 tablet, Rfl: 1  •  ezetimibe-simvastatin (VYTORIN) 10-40 mg per tablet, TAKE 1 TABLET BY MOUTH EVERYDAY AT BEDTIME, Disp: 90 tablet, Rfl: 1  •  fluticasone (FLONASE) 50 mcg/act nasal spray, 1 spray into each nostril daily, Disp: 9.9 mL, Rfl: 0  •  hydrochlorothiazide (HYDRODIURIL) 12.5 mg tablet, TAKE 1 TABLET BY MOUTH EVERY DAY, Disp: 90 tablet, Rfl: 3  •  latanoprost (XALATAN) 0.005 % ophthalmic solution, USE 1 DROP IN EACH EYE AT BEDTIME, Disp: , Rfl:   •  levothyroxine 175 mcg tablet, TAKES 7 & 1/2 TABLETS BY MOUTH PER WEEK.  ONE DAILY AND ON SUNDAY 1.5 TABLET, Disp: 90 tablet, Rfl: 1  •  lisinopril (ZESTRIL) 20 mg tablet, TAKE 1 TABLET BY MOUTH EVERY DAY, Disp: 90 tablet, Rfl: 1  •  metFORMIN (GLUCOPHAGE) 1000 MG tablet, TAKE 1 TABLET BY MOUTH TWICE A DAY WITH MEALS, Disp: 180 tablet, Rfl: 1  •  nirmatrelvir & ritonavir (Paxlovid, 300/100,) tablet therapy pack, Take 3 tablets by mouth 2 (two) times a day for 5 days Take 2 nirmatrelvir tablets + 1 ritonavir tablet together per dose, Disp: 30 tablet, Rfl: 0    Current Allergies     Allergies as of 09/05/2023 - Reviewed 09/05/2023   Allergen Reaction Noted   • Kiwi extract - food allergy Anaphylaxis 07/01/2021   • Shrimp (diagnostic) - food allergy Anaphylaxis 06/20/2022   • Shellfish-derived products - food allergy Rash 08/30/2022   • Latex Hives 04/22/2021   • Nuts - food allergy Swelling 09/05/2023            The following portions of the patient's history were reviewed and updated as appropriate: allergies, current medications, past family history, past medical history, past social history, past surgical history and problem list.     Past Medical History:   Diagnosis Date   • Diabetes mellitus ([address])    • Disease of thyroid gland    • Hypertension        Past Surgical History:   Procedure Laterality Date   • CORONARY ANGIOPLASTY WITH STENT PLACEMENT      LAD       Family History   Problem Relation Age of Onset   • Cervical cancer Mother         hysterectomy   • Breast cancer Maternal Aunt         masectomy   • Breast cancer Maternal Aunt         found lump in nipple   • No Known Problems Sister    • Diabetes Daughter    • No Known Problems Maternal Grandmother    • Prostate cancer Maternal Grandfather    • Heart disease Paternal Aunt    • Heart disease Paternal Aunt          Medications have been verified. Objective   /72   Pulse 95   Temp 98.2 °F (36.8 °C)   Resp (!) 24   Wt 128 kg (282 lb 6 oz)   LMP 09/17/1999 (Exact Date)   SpO2 98%   BMI 45.58 kg/m²        Physical Exam     Physical Exam  Vitals and nursing note reviewed. Constitutional:       General: She is awake. Appearance: Normal appearance. She is overweight. HENT:      Head: Normocephalic and atraumatic. Right Ear: Tympanic membrane normal.      Nose: Congestion and rhinorrhea present. Rhinorrhea is clear. Right Turbinates: Not enlarged, swollen or pale. Left Turbinates: Not enlarged, swollen or pale. Right Sinus: No maxillary sinus tenderness or frontal sinus tenderness. Left Sinus: No maxillary sinus tenderness or frontal sinus tenderness. Mouth/Throat:      Lips: Pink. Mouth: Mucous membranes are moist.      Pharynx: Oropharynx is clear. Uvula midline. No pharyngeal swelling, oropharyngeal exudate, posterior oropharyngeal erythema or uvula swelling. Eyes:      Conjunctiva/sclera: Conjunctivae normal.   Cardiovascular:      Rate and Rhythm: Normal rate and regular rhythm. Pulses: Normal pulses.       Heart sounds: Normal heart sounds. Pulmonary:      Effort: Pulmonary effort is normal.      Breath sounds: Normal breath sounds. Musculoskeletal:      Cervical back: Neck supple. Lymphadenopathy:      Cervical: No cervical adenopathy. Skin:     General: Skin is warm and dry. Neurological:      General: No focal deficit present. Mental Status: She is alert and oriented to person, place, and time. GCS: GCS eye subscore is 4. GCS verbal subscore is 5. GCS motor subscore is 6. Psychiatric:         Mood and Affect: Mood normal.         Behavior: Behavior normal. Behavior is cooperative. Thought Content:  Thought content normal.         Judgment: Judgment normal.

## 2023-09-05 NOTE — TELEPHONE ENCOUNTER
Pt went to [facility] and tested positive with covid. . They sent her here to see Yfn Josue w/i 4 days because she has some SOB. .  She also has some pain in her side. . There are no available appt's w/i that time frame. . Please advise.

## 2023-09-06 ENCOUNTER — TELEMEDICINE (OUTPATIENT)
Dept: FAMILY MEDICINE CLINIC | Facility: CLINIC | Age: 63
End: 2023-09-06
Payer: COMMERCIAL

## 2023-09-06 DIAGNOSIS — U07.1 COVID-19: Primary | ICD-10-CM

## 2023-09-06 PROCEDURE — 99213 OFFICE O/P EST LOW 20 MIN: CPT

## 2023-09-06 NOTE — PROGRESS NOTES
Name: Devaughn Banuelos      : 1960      MRN: [id number]  Encounter Provider: Frances Lees PA-C  Encounter Date: 2023   Encounter department: [address] [place] [address]. COVID-19      The patient presented to the ER yesterday, 23, with a sore throat, congestion, body aches, and a cough and was diagnosed with Covid-19. Patient should continue medications given to her at the ER (Paxlovid, Flonase, and Tesslon pearls) for symptom relief and should continue to use ibuprofen OTC for additional symptom relief. Patient states she does not feel shortness of breath or chest pain, but was told to follow up with PCP due to her "oxygen being low". Patient is also experiencing intermittent blood tinged sputum with coughing since yesterday. It was explained to the patient that her mucus tinged blood is likely the result of bronchial irritation due to excessive and forceful coughing given her clear CXR from yesterday, however she was advised to come to the office for an in-person visit to get her vital signs checked and assess her oxygen level. Patient stated she is unable to get a ride to the office and therefore is unable to come be assessed in person. Advised patient that if symptoms worsened or she experienced chest pain, increased shortness of breath, or was continuously coughing up blood, she needed to call an ambulance and go to the ER right away to get assessed. Emphasized to patient that with her risk factors, her threshold for going back to the ER is low and to monitor her symptoms closely. Advised patient of the need for a quick in person follow-up to evaluate her symptoms and perform a physical exam. Would like patient to follow up in person by the end of the week in order to be assessed. Subjective      Patient presents for a follow-up after being diagnosed with Covid-19 at the ER yesterday, 23.  She was given paxlovid, flonase, and Tesslon pearls for her cough which she states she has been taking. She is also taking vitamin D, vitamin C, and ibuprofen OTC for her symptom relief which has been helping. She has an albuterol inhaler which she states she last used last night while coughing which helped. She reports that she does not feel short of breath but that her "oxygen" was low in the ER yesterday which is why they wanted her to get checked (per ER providers note, O2 was 98%). Patient has no form of transportation to be able to come to the office today to get her vital signs checked. Her symptoms include a sore throat, congestion, body aches, chills/sweats, a headache, and a productive cough. The patient states that yesterday afternoon she started noticing a small amount of blood in her sputum. She states that it does not happen every time she brings something up with her cough but she notices it sometimes. A CXR was performed at the ER yesterday which revealed "no acute cardiopulmonary disease". Her appetite is decreased because of her sore throat but she has been trying to eat liquid foods and stay hydrated. The patient reported that yesterday she had pain in her right side but that today it is feeling better. Review of Systems   Constitutional: Positive for appetite change (decreased due to sore throat), chills, diaphoresis and fatigue. Negative for fever. HENT: Positive for congestion, ear pain (right ear pain ), rhinorrhea and sore throat. Eyes: Negative for pain, itching and visual disturbance. Respiratory: Positive for cough (bringing up small amt of blood with cough ) and chest tightness (only when coughing). Negative for choking, shortness of breath and wheezing. Cardiovascular: Negative for chest pain, palpitations and leg swelling. Gastrointestinal: Negative for abdominal pain, blood in stool, constipation, diarrhea, nausea and vomiting. Genitourinary: Negative for difficulty urinating, dysuria, frequency and urgency. Musculoskeletal: Positive for myalgias. Neurological: Positive for headaches. Negative for dizziness, weakness and light-headedness. Psychiatric/Behavioral: Negative for sleep disturbance. Current Outpatient Medications on File Prior to Visit   Medication Sig   • albuterol (ProAir HFA) 90 mcg/act inhaler Inhale 2 puffs every 6 (six) hours as needed for wheezing   • Aspirin Low Dose 81 MG EC tablet TAKE 1 TABLET BY MOUTH EVERY DAY   • benzonatate (TESSALON PERLES) 100 mg capsule Take 1 capsule (100 mg total) by mouth 3 (three) times a day as needed for cough   • carvedilol (COREG) 12.5 mg tablet TAKE 1 TABLET BY MOUTH TWICE A DAY   • ezetimibe-simvastatin (VYTORIN) 10-40 mg per tablet TAKE 1 TABLET BY MOUTH EVERYDAY AT BEDTIME   • fluticasone (FLONASE) 50 mcg/act nasal spray 1 spray into each nostril daily   • hydrochlorothiazide (HYDRODIURIL) 12.5 mg tablet TAKE 1 TABLET BY MOUTH EVERY DAY   • latanoprost (XALATAN) 0.005 % ophthalmic solution USE 1 DROP IN [facility] EYE AT BEDTIME   • levothyroxine 175 mcg tablet TAKES 7 & 1/2 TABLETS BY MOUTH PER WEEK. ONE DAILY AND ON SUNDAY 1.5 TABLET   • lisinopril (ZESTRIL) 20 mg tablet TAKE 1 TABLET BY MOUTH EVERY DAY   • metFORMIN (GLUCOPHAGE) 1000 MG tablet TAKE 1 TABLET BY MOUTH TWICE A DAY WITH MEALS   • nirmatrelvir & ritonavir (Paxlovid, 300/100,) tablet therapy pack Take 3 tablets by mouth 2 (two) times a day for 5 days Take 2 nirmatrelvir tablets + 1 ritonavir tablet together per dose       Objective     LMP 09/17/1999 (Exact Date)     Physical Exam  Constitutional:       General: She is not in acute distress. Appearance: Normal appearance. She is not ill-appearing or diaphoretic. HENT:      Head: Normocephalic. Nose: Nose normal.      Mouth/Throat:      Mouth: Mucous membranes are moist.      Pharynx: Oropharynx is clear. Musculoskeletal:      Cervical back: Normal range of motion.    Neurological:      Mental Status: She is alert and oriented to person, place, and time.        Rosemary Garcia PA-C

## 2023-09-06 NOTE — PROGRESS NOTES
Virtual Regular Visit    Verification of patient location:    Patient is located at Home in the following state in which I hold an active license [place]      Assessment/Plan:    Problem List Items Addressed This Visit        Other    COVID-19 - Primary     The patient presented to the  yesterday, 9/5/23, with a sore throat, congestion, body aches, and a cough and was diagnosed with Covid-19. Patient should continue medications given to her at the  (Paxlovid, Flonase, and Tesslon pearls) for symptom relief and should continue to use ibuprofen OTC for additional symptom relief. Patient states she does not feel shortness of breath or chest pain, but was told to follow up with PCP due to her "oxygen being low". Patient is also experiencing intermittent blood tinged sputum with coughing since yesterday. It was explained to the patient that her mucus tinged blood is likely the result of bronchial irritation due to excessive and forceful coughing given her benign CXR results from yesterday, however she was advised to come to the office for an in-person visit to get her vital signs checked and assess her oxygen level. Patient stated she is unable to get a ride to the office and therefore is unable to come be assessed in person. Advised patient that if symptoms worsened or she experienced chest pain, increased shortness of breath, or was continuously coughing up blood, she needed to call an ambulance and go to the ER right away to get assessed. Emphasized to patient that with her risk factors, her threshold for going to the ER is low and to monitor her symptoms closely. Advised patient of the need for a quick in person follow-up to evaluate her symptoms and perform a physical exam. Would like patient to follow up in person by the end of the week in order to be assessed.          Reason for visit is   Chief Complaint   Patient presents with   • COVID-19     Sx started Monday   • Virtual Regular Visit        Encounter provider Ema Jackson PA-C    Provider located at [address] [place] [id number]      Recent Visits  Date Type Provider Dept   09/05/23 Telephone [facility] recent visits within past 7 days and meeting all other requirements  Today's Visits  Date Type Provider Dept   09/06/23 Telemedicine Radha Ariza PA-C  Andrea    Showing today's visits and meeting all other requirements  Future Appointments  No visits were found meeting these conditions. Showing future appointments within next 150 days and meeting all other requirements       The patient was identified by name and date of birth. Dewey Collazo was informed that this is a telemedicine visit and that the visit is being conducted through the [employer] platform. She agrees to proceed. .  My office door was closed. No one else was in the room. She acknowledged consent and understanding of privacy and security of the video platform. The patient has agreed to participate and understands they can discontinue the visit at any time. Patient is aware this is a billable service. Montse Zelaya is a 65 y.o. female who presents for a virtual visit. Patient presents for a follow-up after being diagnosed with Covid-19 at [facility] yesterday, 09/5/23. She was given paxlovid, flonase, and Tesslon pearls for her cough which she states she has been taking. She is also taking vitamin D, vitamin C, and ibuprofen OTC for her symptom relief which has been helping. She has an albuterol inhaler which she states she last used last night while coughing which helped. She reports that she does not feel short of breath but that her "oxygen" was low at [facility] yesterday which is why they wanted her to get checked (per  providers note, O2 was 98%). Patient has no form of transportation to be able to come to the office today to get her vital signs checked. Her symptoms include a sore throat, congestion, body aches, chills/sweats, a headache, and a productive cough. The patient states that yesterday afternoon she started noticing a small amount of blood in her sputum. She states that it does not happen every time she brings something up with her cough but she notices it sometimes. A CXR was performed yesterday which revealed "no acute cardiopulmonary disease". Her appetite is decreased because of her sore throat but she has been trying to eat liquid foods and stay hydrated. The patient reported that yesterday she had pain in her right side but that today it is feeling better. Past Medical History:   Diagnosis Date   • Diabetes mellitus ([address])    • Disease of thyroid gland    • Hypertension        Past Surgical History:   Procedure Laterality Date   • CORONARY ANGIOPLASTY WITH STENT PLACEMENT      LAD       Current Outpatient Medications   Medication Sig Dispense Refill   • albuterol (ProAir HFA) 90 mcg/act inhaler Inhale 2 puffs every 6 (six) hours as needed for wheezing 8.5 g 0   • Aspirin Low Dose 81 MG EC tablet TAKE 1 TABLET BY MOUTH EVERY DAY 90 tablet 1   • benzonatate (TESSALON PERLES) 100 mg capsule Take 1 capsule (100 mg total) by mouth 3 (three) times a day as needed for cough 20 capsule 0   • carvedilol (COREG) 12.5 mg tablet TAKE 1 TABLET BY MOUTH TWICE A  tablet 1   • ezetimibe-simvastatin (VYTORIN) 10-40 mg per tablet TAKE 1 TABLET BY MOUTH EVERYDAY AT BEDTIME 90 tablet 1   • fluticasone (FLONASE) 50 mcg/act nasal spray 1 spray into each nostril daily 9.9 mL 0   • hydrochlorothiazide (HYDRODIURIL) 12.5 mg tablet TAKE 1 TABLET BY MOUTH EVERY DAY 90 tablet 3   • latanoprost (XALATAN) 0.005 % ophthalmic solution USE 1 DROP IN EACH EYE AT BEDTIME     • levothyroxine 175 mcg tablet TAKES 7 & 1/2 TABLETS BY MOUTH PER WEEK.  ONE DAILY AND ON SUNDAY 1.5 TABLET 90 tablet 1   • lisinopril (ZESTRIL) 20 mg tablet TAKE 1 TABLET BY MOUTH EVERY DAY 90 tablet 1   • metFORMIN (GLUCOPHAGE) 1000 MG tablet TAKE 1 TABLET BY MOUTH TWICE A DAY WITH MEALS 180 tablet 1   • nirmatrelvir & ritonavir (Paxlovid, 300/100,) tablet therapy pack Take 3 tablets by mouth 2 (two) times a day for 5 days Take 2 nirmatrelvir tablets + 1 ritonavir tablet together per dose 30 tablet 0     No current facility-administered medications for this visit. Allergies   Allergen Reactions   • Kiwi Extract - Food Allergy Anaphylaxis   • Shrimp (Diagnostic) - Food Allergy Anaphylaxis   • Shellfish-Derived Products - Food Allergy Rash     All Seafood    • Latex Hives   • Nuts - Food Allergy Swelling     walnuts       Review of Systems   Constitutional: Positive for appetite change (decreased due to sore throat), chills, diaphoresis and fatigue. Negative for fever. HENT: Positive for congestion, ear pain (right ear), rhinorrhea and sore throat. Negative for hearing loss, sinus pressure, sinus pain and sneezing. Eyes: Negative for pain, itching and visual disturbance. Respiratory: Positive for cough (intermittent blood tinged sputum) and chest tightness (occasionally when coughing). Negative for choking, shortness of breath and wheezing. Cardiovascular: Negative for chest pain, palpitations and leg swelling. Gastrointestinal: Negative for abdominal pain, blood in stool, constipation, diarrhea, nausea and vomiting. Genitourinary: Negative for difficulty urinating, dysuria, frequency and urgency. Musculoskeletal: Positive for myalgias (body aches). Negative for arthralgias and neck pain. Neurological: Positive for headaches. Negative for dizziness, weakness and light-headedness. Psychiatric/Behavioral: Negative for sleep disturbance. Video Exam    There were no vitals filed for this visit. Physical Exam  Constitutional:       General: She is not in acute distress. Appearance: Normal appearance. She is not ill-appearing or diaphoretic.    HENT:      Head: Normocephalic. Nose: Nose normal.      Mouth/Throat:      Mouth: Mucous membranes are moist.      Pharynx: Oropharynx is clear. Musculoskeletal:      Cervical back: Normal range of motion. Neurological:      Mental Status: She is alert and oriented to person, place, and time.         Visit Time  Total Visit Duration: 18 minutes

## 2023-09-07 ENCOUNTER — OFFICE VISIT (OUTPATIENT)
Dept: FAMILY MEDICINE CLINIC | Facility: CLINIC | Age: 63
End: 2023-09-07
Payer: COMMERCIAL

## 2023-09-07 ENCOUNTER — TELEPHONE (OUTPATIENT)
Dept: FAMILY MEDICINE CLINIC | Facility: CLINIC | Age: 63
End: 2023-09-07

## 2023-09-07 VITALS
TEMPERATURE: 97.6 F | HEART RATE: 88 BPM | DIASTOLIC BLOOD PRESSURE: 76 MMHG | SYSTOLIC BLOOD PRESSURE: 128 MMHG | OXYGEN SATURATION: 93 %

## 2023-09-07 DIAGNOSIS — R09.02 OXYGEN DESATURATION: ICD-10-CM

## 2023-09-07 DIAGNOSIS — U07.1 COVID-19: Primary | ICD-10-CM

## 2023-09-07 PROCEDURE — 99215 OFFICE O/P EST HI 40 MIN: CPT

## 2023-09-07 NOTE — PROGRESS NOTES
COVID-19 Outpatient Progress Note    Assessment/Plan:    Problem List Items Addressed This Visit        Other    COVID-19 - Primary    Relevant Orders    Transfer to other facility (Completed)   Other Visit Diagnoses     Oxygen desaturation        Relevant Orders    Transfer to other facility (Completed)         Disposition:     I referred patient to the Emergency Department at: [address] ED. Patient is at high risk for complications of PQVCP-30 given risk factors of DM2, CAD, and obesity. Patient appeared short of breath, with an O2 of 93% on room air when standing, and desaturating to 86-87% after walking a short distance without going back up. Given these physical exam findings and patient's symptoms of chest tightness/shortness of breath when coughing/on exertion along with her continuing to cough up mucus with blood in it, patient was advised to go to the ER for a further work up. Patient should continue to take current medications (Paxlovid, tessalon perles, flonase) for symptom relief. I have spent a total time of 20 minutes on the day of the encounter for this patient including discussing prognosis, instructions for management and documenting in the medical record. Encounter provider: Christiano Castanon PA-C     Provider located at: [address] [place] [address]     Recent Visits  Date Type Provider Dept   09/06/23 Telemedicine THIERNO Rondon [facility]g   09/05/23 Telephone Violet Bradford   Showing recent visits within past 7 days and meeting all other requirements  Today's Visits  Date Type Provider Dept   09/07/23 Office Visit THIERNO Good Pg   Showing today's visits and meeting all other requirements  Future Appointments  No visits were found meeting these conditions.   Showing future appointments within next 150 days and meeting all other requirements     Subjective:   Ana Calix is a 65 y.o. female who has been screened for COVID-19. Patient's symptoms include nasal congestion, rhinorrhea, sore throat, cough (with blood tinged sputum), shortness of breath (with ambulation) and chest tightness (when coughing). Patient denies fever, abdominal pain, nausea, vomiting and diarrhea. - Date of symptom onset: 9/5/2023  - Date of positive COVID-19 test: 9/5/2023. Type of test: Rapid antigen. COVID-19 vaccination status: Fully vaccinated (primary series)    Patient notes she feels more tired today. She also notes feeling shortness of breath and chest tightness/discomofort when ambulating and states that she feels like she "has pneumonia" because of how her lungs feel. She continues to report coughing up mucus tinged with blood intermittently. Yesterday reported she coughed up what looked like a "blood clot". Lab Results   Component Value Date    SARSCOV2 Negative 11/09/2022    SARSCOVAG Positive (A) 09/05/2023     Review of Systems   Constitutional: Negative for fever. HENT: Positive for congestion, rhinorrhea and sore throat. Respiratory: Positive for cough (with blood tinged sputum), chest tightness (when coughing) and shortness of breath (with ambulation). Negative for wheezing. Cardiovascular: Negative for chest pain and palpitations. Gastrointestinal: Negative for abdominal pain, constipation, diarrhea, nausea and vomiting. Genitourinary: Negative for difficulty urinating.      Current Outpatient Medications on File Prior to Visit   Medication Sig   • albuterol (ProAir HFA) 90 mcg/act inhaler Inhale 2 puffs every 6 (six) hours as needed for wheezing   • Aspirin Low Dose 81 MG EC tablet TAKE 1 TABLET BY MOUTH EVERY DAY   • benzonatate (TESSALON PERLES) 100 mg capsule Take 1 capsule (100 mg total) by mouth 3 (three) times a day as needed for cough   • carvedilol (COREG) 12.5 mg tablet TAKE 1 TABLET BY MOUTH TWICE A DAY   • ezetimibe-simvastatin (VYTORIN) 10-40 mg per tablet TAKE 1 TABLET BY MOUTH EVERYDAY AT BEDTIME   • fluticasone (FLONASE) 50 mcg/act nasal spray 1 spray into each nostril daily   • hydrochlorothiazide (HYDRODIURIL) 12.5 mg tablet TAKE 1 TABLET BY MOUTH EVERY DAY   • latanoprost (XALATAN) 0.005 % ophthalmic solution USE 1 DROP IN [facility] EYE AT BEDTIME   • levothyroxine 175 mcg tablet TAKES 7 & 1/2 TABLETS BY MOUTH PER WEEK. ONE DAILY AND ON SUNDAY 1.5 TABLET   • lisinopril (ZESTRIL) 20 mg tablet TAKE 1 TABLET BY MOUTH EVERY DAY   • metFORMIN (GLUCOPHAGE) 1000 MG tablet TAKE 1 TABLET BY MOUTH TWICE A DAY WITH MEALS   • nirmatrelvir & ritonavir (Paxlovid, 300/100,) tablet therapy pack Take 3 tablets by mouth 2 (two) times a day for 5 days Take 2 nirmatrelvir tablets + 1 ritonavir tablet together per dose     Objective:    /76   Pulse 88   Temp 97.6 °F (36.4 °C)   LMP 09/17/1999 (Exact Date)   SpO2 93% Comment: dropped to 86% after short walk and would not come back up       Physical Exam  Constitutional:       General: She is not in acute distress. Appearance: She is obese. She is ill-appearing. She is not diaphoretic. Comments: Patient appeared short of breath when ambulating. HENT:      Head: Normocephalic. Right Ear: External ear normal.      Left Ear: External ear normal.      Mouth/Throat:      Mouth: Mucous membranes are moist.   Cardiovascular:      Rate and Rhythm: Normal rate and regular rhythm. Heart sounds: Normal heart sounds. No murmur heard. Pulmonary:      Effort: Pulmonary effort is normal.      Breath sounds: Normal breath sounds. No wheezing, rhonchi or rales. Musculoskeletal:         General: Normal range of motion. Skin:     General: Skin is warm. Neurological:      Mental Status: She is alert and oriented to person, place, and time.    Psychiatric:         Mood and Affect: Mood normal.       Jac Spurling, PA-C

## 2023-09-07 NOTE — TELEPHONE ENCOUNTER
Pt called office upset that she is being referred to ED after visit today. Advised pt that due to her o2 sats dropping to 86, positive covid test, reports of "coughing up blood clots", and not improving with current treatments that is why she is being referred to ED for further evaluation. Pt then disconnected the call.

## 2023-09-18 ENCOUNTER — ANNUAL EXAM (OUTPATIENT)
Age: 63
End: 2023-09-18
Payer: COMMERCIAL

## 2023-09-18 VITALS
WEIGHT: 276.4 LBS | BODY MASS INDEX: 46.05 KG/M2 | HEIGHT: 65 IN | DIASTOLIC BLOOD PRESSURE: 62 MMHG | SYSTOLIC BLOOD PRESSURE: 156 MMHG

## 2023-09-18 DIAGNOSIS — Z12.31 ENCOUNTER FOR SCREENING MAMMOGRAM FOR MALIGNANT NEOPLASM OF BREAST: ICD-10-CM

## 2023-09-18 DIAGNOSIS — Z01.419 ENCOUNTER FOR ANNUAL ROUTINE GYNECOLOGICAL EXAMINATION: Primary | ICD-10-CM

## 2023-09-18 PROCEDURE — S0612 ANNUAL GYNECOLOGICAL EXAMINA: HCPCS | Performed by: OBSTETRICS & GYNECOLOGY

## 2023-09-20 NOTE — PROGRESS NOTES
Assessment/Plan:     Diagnoses and all orders for this visit:    Encounter for annual routine gynecological examination    Encounter for screening mammogram for malignant neoplasm of breast  -     Mammo screening bilateral w 3d & cad; Future         Subjective      Prdeisys Pebbles Bernabe is a 65 y.o. female who presents for annual exam. The patient has no complaints today. The patient is not sexually active. The patient is not taking hormone replacement therapy. Patient denies post-menopausal vaginal bleeding. She denies any breast or urinary concerns. Pap: 21 Neg/Neg   Mammo: 23 BI-RADS, 2 Benign   Colon: 22 (5 yr recall)      Menstrual History:  OB History        3    Para   3    Term   3       0    AB   0    Living   3       SAB   0    IAB   0    Ectopic   0    Multiple   0    Live Births   3                Patient's last menstrual period was 1999 (exact date). Past Medical History:   Diagnosis Date   • Diabetes mellitus ([address])    • Disease of thyroid gland    • Hypertension        Family History   Problem Relation Age of Onset   • Cervical cancer Mother         hysterectomy   • Breast cancer Maternal Aunt         masectomy   • Breast cancer Maternal Aunt         found lump in nipple   • No Known Problems Sister    • Diabetes Daughter    • No Known Problems Maternal Grandmother    • Prostate cancer Maternal Grandfather    • Heart disease Paternal Aunt    • Heart disease Paternal Aunt        The following portions of the patient's history were reviewed and updated as appropriate: allergies, current medications, past family history, past medical history, past social history, past surgical history and problem list.    Review of Systems  Pertinent items are noted in HPI.      Objective      /62 (BP Location: Right arm, Patient Position: Sitting, Cuff Size: Large)   Ht 5' 4.75" (1.645 m)   Wt 125 kg (276 lb 6.4 oz)   LMP 1999 (Exact Date)   BMI 46.35 kg/m² General:   alert and oriented, in no acute distress   Heart:  Breasts: regular rate and rhythm   appear normal, no suspicious masses, no skin or nipple changes or axillary nodes.    Lungs: effort normal   Abdomen: soft, non-tender, without masses or organomegaly   Vulva: normal   Vagina: normal mucosa   Cervix: no lesions   Uterus: normal size, mobile, non-tender   Adnexa: normal adnexa and no mass, fullness, tenderness

## 2023-09-27 DIAGNOSIS — I25.10 CORONARY ARTERY DISEASE INVOLVING NATIVE CORONARY ARTERY OF NATIVE HEART WITHOUT ANGINA PECTORIS: ICD-10-CM

## 2023-09-27 DIAGNOSIS — E78.2 MIXED HYPERLIPIDEMIA: ICD-10-CM

## 2023-09-27 RX ORDER — EZETIMIBE AND SIMVASTATIN 10; 40 MG/1; MG/1
TABLET ORAL
Qty: 90 TABLET | Refills: 1 | Status: SHIPPED | OUTPATIENT
Start: 2023-09-27

## 2023-10-06 ENCOUNTER — OFFICE VISIT (OUTPATIENT)
Dept: VASCULAR SURGERY | Facility: CLINIC | Age: 63
End: 2023-10-06
Payer: COMMERCIAL

## 2023-10-06 ENCOUNTER — TRANSCRIBE ORDERS (OUTPATIENT)
Dept: ADMINISTRATIVE | Facility: HOSPITAL | Age: 63
End: 2023-10-06

## 2023-10-06 VITALS
DIASTOLIC BLOOD PRESSURE: 70 MMHG | SYSTOLIC BLOOD PRESSURE: 118 MMHG | HEART RATE: 65 BPM | HEIGHT: 66 IN | WEIGHT: 281 LBS | BODY MASS INDEX: 45.16 KG/M2

## 2023-10-06 DIAGNOSIS — I83.813 VARICOSE VEINS OF BOTH LOWER EXTREMITIES WITH PAIN: Primary | ICD-10-CM

## 2023-10-06 PROCEDURE — 99212 OFFICE O/P EST SF 10 MIN: CPT | Performed by: SURGERY

## 2023-10-06 NOTE — ASSESSMENT & PLAN NOTE
Stable lower extremity left worse than right varicose veins. Compliant with compression stockings. At this time no wound development. Ultrasounds demonstrating competence of the deep venous systems. The left greater saphenous vein is incompetent along with an anterior accessory branch however her disease processes have not progressed. We will reimage and revisit the possibility of venous intervention in 3 months time.

## 2023-10-06 NOTE — LETTER
October 6, 2023     [facility], [address]    Patient: Jason Tabares   YOB: 1960   Date of Visit: 10/6/2023       Dear Dr. Wander Herrera: Thank you for referring Isac Carter to me for evaluation. Below are my notes for this consultation. If you have questions, please do not hesitate to call me. I look forward to following your patient along with you.          Sincerely,        Julio Aparicio MD        CC: Tong Truong

## 2023-10-06 NOTE — PROGRESS NOTES
Assessment/Plan:    Varicose veins of both lower extremities with pain  Stable lower extremity left worse than right varicose veins. Compliant with compression stockings. At this time no wound development. Ultrasounds demonstrating competence of the deep venous systems. The left greater saphenous vein is incompetent along with an anterior accessory branch however her disease processes have not progressed. We will reimage and revisit the possibility of venous intervention in 3 months time. Subjective:      Patient ID: Sammy Bean is a 59 y.o. female. Patient presents to [facility] LEVDR done 8/25 for symptomatic Varicose Veins L>R. HPI  The patient is a 27-year-old female with a history of bilateral lower extremity varicose vein disease. She is compliant with her compression stockings which she started using in August.  She has not developed any wounds and has in fact healed wounds of her lower extremities. At this time she complains of ongoing discomfort. We counseled on healthy lifestyle weight loss as well as compliance with compression dressings which I believe she is doing. We will revisit this issue in 3 months time which is a short interval to assess her for ongoing symptoms or worsening pain she was counseled extensively on vigilance for worsening venous disease and potential for intervention sooner. If she develops a wound she is to call back and schedule a visit with me right away. Review of Systems   Constitutional: Negative. HENT: Negative. Eyes: Negative. Respiratory: Negative. Cardiovascular:        Painful Veins   Gastrointestinal: Negative. Endocrine: Negative. Genitourinary: Negative. Musculoskeletal: Negative. Skin: Negative. Allergic/Immunologic: Negative. Neurological: Negative. Hematological: Negative. Psychiatric/Behavioral: Negative.       Objective:  /70 (BP Location: Right arm, Patient Position: Sitting, Cuff Size: Standard)   Pulse 65   Ht 5' 6" (1.676 m)   Wt 127 kg (281 lb)   LMP 09/17/1999 (Exact Date)   BMI 45.35 kg/m²      Physical Exam  Constitutional:       Appearance: Normal appearance. HENT:      Head: Normocephalic and atraumatic. Nose: No congestion or rhinorrhea. Eyes:      Extraocular Movements: Extraocular movements intact. Pupils: Pupils are equal, round, and reactive to light. Cardiovascular:      Rate and Rhythm: Normal rate and regular rhythm. Comments: Chronic darkening of the medial distal calf region consistent with chronic venous insufficiency. No wounds. Pulmonary:      Effort: Pulmonary effort is normal.      Breath sounds: No stridor. Abdominal:      General: There is no distension. Tenderness: There is no abdominal tenderness. Musculoskeletal:         General: No swelling. Normal range of motion. Cervical back: Normal range of motion and neck supple. Skin:     General: Skin is warm. Coloration: Skin is not jaundiced. Neurological:      General: No focal deficit present. Mental Status: She is alert and oriented to person, place, and time.    Psychiatric:         Mood and Affect: Mood normal.         Behavior: Behavior normal.

## 2023-10-06 NOTE — PATIENT INSTRUCTIONS
1. Varicose veins of both lower extremities with pain  Assessment & Plan:  Stable lower extremity left worse than right varicose veins. Compliant with compression stockings. At this time no wound development. Ultrasounds demonstrating competence of the deep venous systems. The left greater saphenous vein is incompetent along with an anterior accessory branch however her disease processes have not progressed. We will reimage and revisit the possibility of venous intervention in 3 months time.

## 2023-10-18 DIAGNOSIS — E11.9 TYPE 2 DIABETES MELLITUS WITHOUT COMPLICATION, WITHOUT LONG-TERM CURRENT USE OF INSULIN (HCC): ICD-10-CM

## 2023-10-18 DIAGNOSIS — E03.9 HYPOTHYROIDISM, UNSPECIFIED TYPE: ICD-10-CM

## 2023-10-18 RX ORDER — LEVOTHYROXINE SODIUM 175 UG/1
TABLET ORAL
Qty: 90 TABLET | Refills: 1 | Status: SHIPPED | OUTPATIENT
Start: 2023-10-18

## 2023-10-30 ENCOUNTER — OFFICE VISIT (OUTPATIENT)
Dept: FAMILY MEDICINE CLINIC | Facility: CLINIC | Age: 63
End: 2023-10-30
Payer: COMMERCIAL

## 2023-10-30 VITALS
WEIGHT: 284 LBS | OXYGEN SATURATION: 97 % | SYSTOLIC BLOOD PRESSURE: 124 MMHG | BODY MASS INDEX: 45.64 KG/M2 | DIASTOLIC BLOOD PRESSURE: 82 MMHG | HEART RATE: 60 BPM | HEIGHT: 66 IN | TEMPERATURE: 96.5 F

## 2023-10-30 DIAGNOSIS — E78.5 HYPERLIPIDEMIA ASSOCIATED WITH TYPE 2 DIABETES MELLITUS: ICD-10-CM

## 2023-10-30 DIAGNOSIS — I25.10 CORONARY ARTERY DISEASE INVOLVING NATIVE CORONARY ARTERY OF NATIVE HEART WITHOUT ANGINA PECTORIS: ICD-10-CM

## 2023-10-30 DIAGNOSIS — Z23 NEED FOR INFLUENZA VACCINATION: ICD-10-CM

## 2023-10-30 DIAGNOSIS — E11.69 TYPE 2 DIABETES MELLITUS WITH MORBID OBESITY (HCC): ICD-10-CM

## 2023-10-30 DIAGNOSIS — E11.59 HYPERTENSION COMPLICATING DIABETES: ICD-10-CM

## 2023-10-30 DIAGNOSIS — I15.2 HYPERTENSION COMPLICATING DIABETES: ICD-10-CM

## 2023-10-30 DIAGNOSIS — E11.69 HYPERLIPIDEMIA ASSOCIATED WITH TYPE 2 DIABETES MELLITUS: ICD-10-CM

## 2023-10-30 DIAGNOSIS — E66.01 TYPE 2 DIABETES MELLITUS WITH MORBID OBESITY (HCC): ICD-10-CM

## 2023-10-30 DIAGNOSIS — E11.9 TYPE 2 DIABETES MELLITUS WITHOUT COMPLICATION, WITHOUT LONG-TERM CURRENT USE OF INSULIN (HCC): Primary | ICD-10-CM

## 2023-10-30 DIAGNOSIS — E89.0 HYPOTHYROIDISM FOLLOWING RADIOIODINE THERAPY: ICD-10-CM

## 2023-10-30 PROBLEM — R52 BODY ACHES: Status: RESOLVED | Noted: 2022-11-09 | Resolved: 2023-10-30

## 2023-10-30 PROBLEM — Z95.5 STATUS POST CORONARY ARTERY STENT PLACEMENT: Status: RESOLVED | Noted: 2020-01-22 | Resolved: 2023-10-30

## 2023-10-30 PROBLEM — Z99.89 AMBULATES WITH CANE: Status: RESOLVED | Noted: 2019-08-21 | Resolved: 2023-10-30

## 2023-10-30 PROBLEM — E78.2 MIXED HYPERLIPIDEMIA: Status: RESOLVED | Noted: 2021-07-01 | Resolved: 2023-10-30

## 2023-10-30 PROBLEM — U07.1 COVID-19: Status: RESOLVED | Noted: 2023-09-06 | Resolved: 2023-10-30

## 2023-10-30 LAB
LEFT EYE DIABETIC RETINOPATHY: ABNORMAL
LEFT EYE IMAGE QUALITY: ABNORMAL
LEFT EYE MACULAR EDEMA: ABNORMAL
LEFT EYE OTHER RETINOPATHY: ABNORMAL
RIGHT EYE DIABETIC RETINOPATHY: ABNORMAL
RIGHT EYE IMAGE QUALITY: ABNORMAL
RIGHT EYE MACULAR EDEMA: ABNORMAL
RIGHT EYE OTHER RETINOPATHY: ABNORMAL
SEVERITY (EYE EXAM): ABNORMAL
SL AMB POCT HEMOGLOBIN AIC: 6.4 (ref ?–6.5)

## 2023-10-30 PROCEDURE — 92250 FUNDUS PHOTOGRAPHY W/I&R: CPT | Performed by: PHYSICIAN ASSISTANT

## 2023-10-30 PROCEDURE — 3044F HG A1C LEVEL LT 7.0%: CPT | Performed by: PHYSICIAN ASSISTANT

## 2023-10-30 PROCEDURE — 2024F 7 FLD RTA PHOTO EVC RTNOPTHY: CPT | Performed by: PHYSICIAN ASSISTANT

## 2023-10-30 PROCEDURE — 99214 OFFICE O/P EST MOD 30 MIN: CPT | Performed by: PHYSICIAN ASSISTANT

## 2023-10-30 PROCEDURE — 3725F SCREEN DEPRESSION PERFORMED: CPT | Performed by: PHYSICIAN ASSISTANT

## 2023-10-30 PROCEDURE — 90686 IIV4 VACC NO PRSV 0.5 ML IM: CPT | Performed by: PHYSICIAN ASSISTANT

## 2023-10-30 PROCEDURE — 83036 HEMOGLOBIN GLYCOSYLATED A1C: CPT | Performed by: PHYSICIAN ASSISTANT

## 2023-10-30 PROCEDURE — 90471 IMMUNIZATION ADMIN: CPT | Performed by: PHYSICIAN ASSISTANT

## 2023-10-30 NOTE — PROGRESS NOTES
Name: Brice Palmer      : 1960      MRN: [id number]  Encounter Provider: Rona Thompson PA-C  Encounter Date: 10/30/2023   Encounter department: [address] [place] [address]     1. Type 2 diabetes mellitus without complication, without long-term current use of insulin (HCC)  -     Albumin / creatinine urine ratio; Future; Expected date: 10/30/2023  -     Comprehensive metabolic panel; Future; Expected date: 10/30/2023  -     Hemoglobin A1C; Future; Expected date: 10/30/2023  -     CBC and differential; Future; Expected date: 10/30/2023  -     POCT hemoglobin A1c  -     IRIS Diabetic eye exam    2. Hyperlipidemia associated with type 2 diabetes mellitus   -     Lipid Panel with Direct LDL reflex; Future; Expected date: 10/30/2023    3. Coronary artery disease involving native coronary artery of native heart without angina pectoris  -     CBC and differential; Future; Expected date: 10/30/2023  -     Lipid Panel with Direct LDL reflex; Future; Expected date: 10/30/2023    4. Hypertension complicating diabetes   -     CBC and differential; Future; Expected date: 10/30/2023    5. Type 2 diabetes mellitus with morbid obesity ([address])    6. Need for influenza vaccination  -     influenza vaccine, quadrivalent, 0.5 mL, preservative-free, for adult and pediatric patients 6 mos+ (AFLURIA, FLUARIX, FLULAVAL, FLUZONE)    7. Hypothyroidism following radioiodine therapy    A1c stable, well controlled. BP at goal. Due for labs. No medication changes today. Last TSH wnl. No cardiac complaints, had normal stress testing in the interim. Utd with mammography, PAP, CRC screening. 3 month follow up, earlier prn    Depression Screening and Follow-up Plan: Patient was screened for depression during today's encounter. They screened negative with a PHQ-2 score of 0. Subjective     DM: a1c stable at 6.4. on metformin.  On ace, statin  HLD: due for FLP, historically well controlled on simvastatin ezitimbe  HTN: on lisinopril, coreg, /82  CAD: Hx of stenting to the LAD, no exertional CP, no SOB. On ASA, statin, coreg. Normal stress test in 8/2023. She follows with cardiology  Hypothyroid: on levothyroxine 175mcg last TSH 7/2023 wnl  Completed mammography, BRCA+    She has some intermittent L sided back pain/sciatic sx but controlled with supportive measures. Otherwise feeling well      Due for labs      Review of Systems   Constitutional:  Negative for chills, fatigue and fever. HENT:  Negative for congestion, ear pain, hearing loss, nosebleeds, postnasal drip, rhinorrhea, sinus pressure, sinus pain, sneezing and sore throat. Eyes:  Negative for pain, discharge, itching and visual disturbance. Respiratory:  Negative for cough, chest tightness, shortness of breath and wheezing. Cardiovascular:  Negative for chest pain, palpitations and leg swelling. Gastrointestinal:  Negative for abdominal pain, blood in stool, constipation, diarrhea, nausea and vomiting. Genitourinary:  Negative for frequency and urgency. Neurological:  Negative for dizziness, light-headedness and numbness.        Past Medical History:   Diagnosis Date    COVID-19 09/06/2023    Diabetes mellitus ([address])     Disease of thyroid gland     Hypertension      Past Surgical History:   Procedure Laterality Date    CORONARY ANGIOPLASTY WITH STENT PLACEMENT      LAD     Family History   Problem Relation Age of Onset    Cervical cancer Mother         hysterectomy    Breast cancer Maternal Aunt         masectomy    Breast cancer Maternal Aunt         found lump in nipple    No Known Problems Sister     Diabetes Daughter     No Known Problems Maternal Grandmother     Prostate cancer Maternal Grandfather     Heart disease Paternal Aunt     Heart disease Paternal Aunt      Social History     Socioeconomic History    Marital status: /Civil Union     Spouse name: None    Number of children: None    Years of education: None Highest education level: None   Occupational History    None   Tobacco Use    Smoking status: Never    Smokeless tobacco: Never   Vaping Use    Vaping Use: Never used   Substance and Sexual Activity    Alcohol use: Never    Drug use: Never    Sexual activity: Not Currently     Partners: Male     Birth control/protection: Post-menopausal   Other Topics Concern    None   Social History Narrative    None     Social Determinants of Health     Financial Resource Strain: Not on file   Food Insecurity: Not on file   Transportation Needs: Not on file   Physical Activity: Not on file   Stress: Not on file   Social Connections: Not on file   Intimate Partner Violence: Not on file   Housing Stability: Not on file     Current Outpatient Medications on File Prior to Visit   Medication Sig    albuterol (ProAir HFA) 90 mcg/act inhaler Inhale 2 puffs every 6 (six) hours as needed for wheezing    Aspirin Low Dose 81 MG EC tablet TAKE 1 TABLET BY MOUTH EVERY DAY    carvedilol (COREG) 12.5 mg tablet TAKE 1 TABLET BY MOUTH TWICE A DAY    ezetimibe-simvastatin (VYTORIN) 10-40 mg per tablet TAKE 1 TABLET BY MOUTH EVERYDAY AT BEDTIME    fluticasone (FLONASE) 50 mcg/act nasal spray 1 spray into each nostril daily    hydrochlorothiazide (HYDRODIURIL) 12.5 mg tablet TAKE 1 TABLET BY MOUTH EVERY DAY    levothyroxine 175 mcg tablet TAKES 7 & 1/2 TABLETS BY MOUTH PER WEEK.  ONE DAILY AND ON SUNDAY 1.5 TABLET    lisinopril (ZESTRIL) 20 mg tablet TAKE 1 TABLET BY MOUTH EVERY DAY    metFORMIN (GLUCOPHAGE) 1000 MG tablet TAKE 1 TABLET BY MOUTH TWICE A DAY WITH MEALS     Allergies   Allergen Reactions    Kiwi Extract - Food Allergy Anaphylaxis    Shrimp (Diagnostic) - Food Allergy Anaphylaxis    Shellfish-Derived Products - Food Allergy Rash     All Seafood     Latex Hives    Nuts - Food Allergy Swelling     walnuts     Immunization History   Administered Date(s) Administered    COVID-19 MODERNA VACC 0.5 ML IM 07/05/2021, 08/07/2021    INFLUENZA 08/30/2022    Influenza, injectable, quadrivalent, preservative free 0.5 mL 08/30/2022, 10/30/2023    Influenza, recombinant, quadrivalent,injectable, preservative free 10/04/2021       Objective     /82 (BP Location: Left arm, Patient Position: Sitting, Cuff Size: Large)   Pulse 60   Temp (!) 96.5 °F (35.8 °C)   Ht 5' 6" (1.676 m)   Wt 129 kg (284 lb)   LMP 09/17/1999 (Exact Date)   SpO2 97%   BMI 45.84 kg/m²     Physical Exam  Vitals and nursing note reviewed. Constitutional:       General: She is not in acute distress. Appearance: Normal appearance. HENT:      Head: Normocephalic and atraumatic. Nose: Nose normal.   Eyes:      Pupils: Pupils are equal, round, and reactive to light. Cardiovascular:      Rate and Rhythm: Normal rate and regular rhythm. Heart sounds: Normal heart sounds. No murmur heard. Pulmonary:      Effort: Pulmonary effort is normal. No respiratory distress. Breath sounds: Normal breath sounds. No wheezing, rhonchi or rales. Abdominal:      Palpations: Abdomen is soft. Musculoskeletal:         General: Normal range of motion. Cervical back: Normal range of motion and neck supple. Skin:     General: Skin is warm and dry. Neurological:      Mental Status: She is alert and oriented to person, place, and time.    Psychiatric:         Mood and Affect: Mood and affect normal.       Kristy Dowell PA-C

## 2023-11-09 ENCOUNTER — TELEPHONE (OUTPATIENT)
Dept: VASCULAR SURGERY | Facility: CLINIC | Age: 63
End: 2023-11-09

## 2023-11-09 NOTE — TELEPHONE ENCOUNTER
Called patient and LMOM to call the office to set up 3 mos. F/u with Dr. Beau Simmons. L/S 10/6/2023. RR LEV 1/8/2024.

## 2023-11-14 ENCOUNTER — TELEPHONE (OUTPATIENT)
Dept: FAMILY MEDICINE CLINIC | Facility: CLINIC | Age: 63
End: 2023-11-14

## 2023-11-14 DIAGNOSIS — E11.9 TYPE 2 DIABETES MELLITUS WITHOUT COMPLICATION, WITHOUT LONG-TERM CURRENT USE OF INSULIN (HCC): Primary | ICD-10-CM

## 2023-11-14 LAB
ALBUMIN SERPL-MCNC: 4 G/DL (ref 3.6–5.1)
ALBUMIN/CREAT UR: 4 MCG/MG CREAT
ALBUMIN/GLOB SERPL: 1.5 (CALC) (ref 1–2.5)
ALP SERPL-CCNC: 79 U/L (ref 37–153)
ALT SERPL-CCNC: 19 U/L (ref 6–29)
AST SERPL-CCNC: 17 U/L (ref 10–35)
BASOPHILS # BLD AUTO: 32 CELLS/UL (ref 0–200)
BASOPHILS NFR BLD AUTO: 0.5 %
BILIRUB SERPL-MCNC: 0.3 MG/DL (ref 0.2–1.2)
BUN SERPL-MCNC: 17 MG/DL (ref 7–25)
BUN/CREAT SERPL: ABNORMAL (CALC) (ref 6–22)
CALCIUM SERPL-MCNC: 9.5 MG/DL (ref 8.6–10.4)
CHLORIDE SERPL-SCNC: 106 MMOL/L (ref 98–110)
CHOLEST SERPL-MCNC: 180 MG/DL
CHOLEST/HDLC SERPL: 3.2 (CALC)
CO2 SERPL-SCNC: 29 MMOL/L (ref 20–32)
CREAT SERPL-MCNC: 0.66 MG/DL (ref 0.5–1.05)
CREAT UR-MCNC: 89 MG/DL (ref 20–275)
EOSINOPHIL # BLD AUTO: 128 CELLS/UL (ref 15–500)
EOSINOPHIL NFR BLD AUTO: 2 %
ERYTHROCYTE [DISTWIDTH] IN BLOOD BY AUTOMATED COUNT: 14.7 % (ref 11–15)
GFR/BSA.PRED SERPLBLD CYS-BASED-ARV: 99 ML/MIN/1.73M2
GLOBULIN SER CALC-MCNC: 2.7 G/DL (CALC) (ref 1.9–3.7)
GLUCOSE SERPL-MCNC: 110 MG/DL (ref 65–99)
HBA1C MFR BLD: 6.6 % OF TOTAL HGB
HCT VFR BLD AUTO: 36.6 % (ref 35–45)
HDLC SERPL-MCNC: 56 MG/DL
HGB BLD-MCNC: 11.7 G/DL (ref 11.7–15.5)
LDLC SERPL CALC-MCNC: 100 MG/DL (CALC)
LYMPHOCYTES # BLD AUTO: 1869 CELLS/UL (ref 850–3900)
LYMPHOCYTES NFR BLD AUTO: 29.2 %
MCH RBC QN AUTO: 29 PG (ref 27–33)
MCHC RBC AUTO-ENTMCNC: 32 G/DL (ref 32–36)
MCV RBC AUTO: 90.8 FL (ref 80–100)
MICROALBUMIN UR-MCNC: 0.4 MG/DL
MONOCYTES # BLD AUTO: 646 CELLS/UL (ref 200–950)
MONOCYTES NFR BLD AUTO: 10.1 %
NEUTROPHILS # BLD AUTO: 3725 CELLS/UL (ref 1500–7800)
NEUTROPHILS NFR BLD AUTO: 58.2 %
NONHDLC SERPL-MCNC: 124 MG/DL (CALC)
PLATELET # BLD AUTO: 144 THOUSAND/UL (ref 140–400)
PMV BLD REES-ECKER: 12.8 FL (ref 7.5–12.5)
POTASSIUM SERPL-SCNC: 4.8 MMOL/L (ref 3.5–5.3)
PROT SERPL-MCNC: 6.7 G/DL (ref 6.1–8.1)
RBC # BLD AUTO: 4.03 MILLION/UL (ref 3.8–5.1)
SODIUM SERPL-SCNC: 141 MMOL/L (ref 135–146)
TRIGL SERPL-MCNC: 139 MG/DL
WBC # BLD AUTO: 6.4 THOUSAND/UL (ref 3.8–10.8)

## 2023-11-14 RX ORDER — BLOOD-GLUCOSE METER
EACH MISCELLANEOUS
Qty: 100 EACH | Refills: 3 | Status: SHIPPED | OUTPATIENT
Start: 2023-11-14

## 2023-11-14 RX ORDER — LANCETS 33 GAUGE
EACH MISCELLANEOUS
Qty: 100 EACH | Refills: 3 | Status: SHIPPED | OUTPATIENT
Start: 2023-11-14

## 2023-11-14 RX ORDER — LANCETS 30 GAUGE
EACH MISCELLANEOUS
Qty: 1 KIT | Refills: 0 | Status: SHIPPED | OUTPATIENT
Start: 2023-11-14

## 2023-11-14 NOTE — TELEPHONE ENCOUNTER
The patient called to ask for an order for One Touch test strips to be sent to the [facility] in [place]. She does not have an order for a One Touch monitoring machine but she stated that she wants to "check her sugar levels because her last blood test result showed an elevated sugar level and she would like to have direct control over her sugars. "

## 2024-01-18 DIAGNOSIS — I10 ESSENTIAL HYPERTENSION: ICD-10-CM

## 2024-01-18 RX ORDER — CARVEDILOL 12.5 MG/1
TABLET ORAL
Qty: 180 TABLET | Refills: 1 | Status: SHIPPED | OUTPATIENT
Start: 2024-01-18

## 2024-01-24 DIAGNOSIS — I10 ESSENTIAL HYPERTENSION: ICD-10-CM

## 2024-01-24 RX ORDER — LISINOPRIL 20 MG/1
TABLET ORAL
Qty: 90 TABLET | Refills: 1 | Status: SHIPPED | OUTPATIENT
Start: 2024-01-24

## 2024-02-08 ENCOUNTER — APPOINTMENT (OUTPATIENT)
Dept: RADIOLOGY | Facility: CLINIC | Age: 64
End: 2024-02-08
Payer: COMMERCIAL

## 2024-02-08 ENCOUNTER — OFFICE VISIT (OUTPATIENT)
Dept: FAMILY MEDICINE CLINIC | Facility: CLINIC | Age: 64
End: 2024-02-08
Payer: COMMERCIAL

## 2024-02-08 VITALS
SYSTOLIC BLOOD PRESSURE: 118 MMHG | TEMPERATURE: 96.8 F | BODY MASS INDEX: 45.16 KG/M2 | HEIGHT: 66 IN | DIASTOLIC BLOOD PRESSURE: 62 MMHG | WEIGHT: 281 LBS | HEART RATE: 77 BPM | OXYGEN SATURATION: 98 %

## 2024-02-08 DIAGNOSIS — I15.2 HYPERTENSION COMPLICATING DIABETES: ICD-10-CM

## 2024-02-08 DIAGNOSIS — E78.5 HYPERLIPIDEMIA ASSOCIATED WITH TYPE 2 DIABETES MELLITUS: ICD-10-CM

## 2024-02-08 DIAGNOSIS — M17.0 PRIMARY OSTEOARTHRITIS OF BOTH KNEES: Primary | ICD-10-CM

## 2024-02-08 DIAGNOSIS — E11.9 TYPE 2 DIABETES MELLITUS WITHOUT COMPLICATION, WITHOUT LONG-TERM CURRENT USE OF INSULIN (HCC): ICD-10-CM

## 2024-02-08 DIAGNOSIS — M17.0 PRIMARY OSTEOARTHRITIS OF BOTH KNEES: ICD-10-CM

## 2024-02-08 DIAGNOSIS — E66.01 TYPE 2 DIABETES MELLITUS WITH MORBID OBESITY (HCC): ICD-10-CM

## 2024-02-08 DIAGNOSIS — E78.2 MIXED HYPERLIPIDEMIA: ICD-10-CM

## 2024-02-08 DIAGNOSIS — E11.69 HYPERLIPIDEMIA ASSOCIATED WITH TYPE 2 DIABETES MELLITUS: ICD-10-CM

## 2024-02-08 DIAGNOSIS — E11.59 HYPERTENSION COMPLICATING DIABETES: ICD-10-CM

## 2024-02-08 DIAGNOSIS — E11.69 TYPE 2 DIABETES MELLITUS WITH MORBID OBESITY (HCC): ICD-10-CM

## 2024-02-08 DIAGNOSIS — I25.10 CORONARY ARTERY DISEASE INVOLVING NATIVE CORONARY ARTERY OF NATIVE HEART WITHOUT ANGINA PECTORIS: ICD-10-CM

## 2024-02-08 PROCEDURE — 73560 X-RAY EXAM OF KNEE 1 OR 2: CPT

## 2024-02-08 PROCEDURE — 99214 OFFICE O/P EST MOD 30 MIN: CPT | Performed by: PHYSICIAN ASSISTANT

## 2024-02-08 RX ORDER — EZETIMIBE AND SIMVASTATIN 10; 40 MG/1; MG/1
1 TABLET ORAL
Qty: 90 TABLET | Refills: 1 | Status: SHIPPED | OUTPATIENT
Start: 2024-02-08

## 2024-02-08 NOTE — PROGRESS NOTES
Name: Tong Bolaños      : 1960      MRN: [id number]  Encounter Provider: Sonny Estrada PA-C  Encounter Date: 2024   Encounter department: [facility]    Assessment & Plan     1. Primary osteoarthritis of both knees  -     XR knee 1 or 2 vw left; Future; Expected date: 2024  -     Ambulatory Referral to Orthopedic Surgery; Future    2. Hypertension complicating diabetes   -     Comprehensive metabolic panel; Future; Expected date: 2024  -     CBC and differential; Future; Expected date: 2024    3. Type 2 diabetes mellitus without complication, without long-term current use of insulin (HCC)  -     Comprehensive metabolic panel; Future; Expected date: 2024  -     Hemoglobin A1C; Future; Expected date: 2024    4. Hyperlipidemia associated with type 2 diabetes mellitus   -     Lipid Panel with Direct LDL reflex; Future; Expected date: 2024    5. Type 2 diabetes mellitus with morbid obesity (HCC)  -     CBC and differential; Future; Expected date: 2024    6. Coronary artery disease involving native coronary artery of native heart without angina pectoris  -     ezetimibe-simvastatin (VYTORIN) 10-40 mg per tablet; Take 1 tablet by mouth daily at bedtime    7. Mixed hyperlipidemia  -     ezetimibe-simvastatin (VYTORIN) 10-40 mg per tablet; Take 1 tablet by mouth daily at bedtime    Suspect her knee pain is related to her arthritis, likely progressed since XR in . Updated imaging today, refer back to ortho. Recommend tylenol, compression, ice and rest.     Not quite due for A1c today, traditionally well controlled. Reviewed labs from 2023, will update labs as above and follow with results. BP controlled. She has not had her statin in several days, will resend to pharmacy.     4 month follow up, earlier prn       Subjective     Pt presents with concerns of L knee pain. Known OA of the knee. No new injuries. She had a cortisone injection  with Dr Martins nearly 3 years ago and had relief for a while. No swelling or color change. Tried topical pain releif otc without benefit.       Review of Systems   Constitutional:  Negative for chills, fatigue and fever.   HENT:  Negative for congestion, ear pain, hearing loss, nosebleeds, postnasal drip, rhinorrhea, sinus pressure, sinus pain, sneezing and sore throat.    Eyes:  Negative for pain, discharge, itching and visual disturbance.   Respiratory:  Negative for cough, chest tightness, shortness of breath and wheezing.    Cardiovascular:  Negative for chest pain, palpitations and leg swelling.   Gastrointestinal:  Negative for abdominal pain, blood in stool, constipation, diarrhea, nausea and vomiting.   Genitourinary:  Negative for frequency and urgency.   Musculoskeletal:  Positive for arthralgias.   Neurological:  Negative for dizziness, light-headedness and numbness.       Past Medical History:   Diagnosis Date   • COVID-19 09/06/2023   • Diabetes mellitus (HCC)    • Disease of thyroid gland    • Hypertension      Past Surgical History:   Procedure Laterality Date   • CORONARY ANGIOPLASTY WITH STENT PLACEMENT      LAD     Family History   Problem Relation Age of Onset   • Cervical cancer Mother         hysterectomy   • Breast cancer Maternal Aunt         masectomy   • Breast cancer Maternal Aunt         found lump in nipple   • No Known Problems Sister    • Diabetes Daughter    • No Known Problems Maternal Grandmother    • Prostate cancer Maternal Grandfather    • Heart disease Paternal Aunt    • Heart disease Paternal Aunt      Social History     Socioeconomic History   • Marital status: /Civil Union     Spouse name: None   • Number of children: None   • Years of education: None   • Highest education level: None   Occupational History   • None   Tobacco Use   • Smoking status: Never   • Smokeless tobacco: Never   Vaping Use   • Vaping status: Never Used   Substance and Sexual Activity   •  Alcohol use: Never   • Drug use: Never   • Sexual activity: Not Currently     Partners: Male     Birth control/protection: Post-menopausal   Other Topics Concern   • None   Social History Narrative   • None     Social Determinants of Health     Financial Resource Strain: Not on file   Food Insecurity: Not on file   Transportation Needs: Not on file   Physical Activity: Not on file   Stress: Not on file   Social Connections: Not on file   Intimate Partner Violence: Not on file   Housing Stability: Not on file     Current Outpatient Medications on File Prior to Visit   Medication Sig   • albuterol (ProAir HFA) 90 mcg/act inhaler Inhale 2 puffs every 6 (six) hours as needed for wheezing   • Aspirin Low Dose 81 MG EC tablet TAKE 1 TABLET BY MOUTH EVERY DAY   • Blood Glucose Monitoring Suppl (OneTouch Verio Reflect) w/Device KIT Check blood sugars once daily. Please substitute with appropriate alternative as covered by patient's insurance. Dx: E11.65   • carvedilol (COREG) 12.5 mg tablet TAKE 1 TABLET BY MOUTH TWICE A DAY   • fluticasone (FLONASE) 50 mcg/act nasal spray 1 spray into each nostril daily   • glucose blood (OneTouch Verio) test strip Check blood sugars once daily. Please substitute with appropriate alternative as covered by patient's insurance. Dx: E11.65   • hydrochlorothiazide (HYDRODIURIL) 12.5 mg tablet TAKE 1 TABLET BY MOUTH EVERY DAY   • levothyroxine 175 mcg tablet TAKES 7 & 1/2 TABLETS BY MOUTH PER WEEK. ONE DAILY AND ON SUNDAY 1.5 TABLET   • lisinopril (ZESTRIL) 20 mg tablet TAKE 1 TABLET BY MOUTH EVERY DAY   • metFORMIN (GLUCOPHAGE) 1000 MG tablet TAKE 1 TABLET BY MOUTH TWICE A DAY WITH MEALS   • OneTouch Delica Lancets 33G MISC Check blood sugars once daily. Please substitute with appropriate alternative as covered by patient's insurance. Dx: E11.65   • [DISCONTINUED] ezetimibe-simvastatin (VYTORIN) 10-40 mg per tablet TAKE 1 TABLET BY MOUTH EVERYDAY AT BEDTIME     Allergies   Allergen Reactions  "  • Kiwi Extract - Food Allergy Anaphylaxis   • Shrimp (Diagnostic) - Food Allergy Anaphylaxis   • Shellfish-Derived Products - Food Allergy Rash     All Seafood    • Latex Hives   • Nuts - Food Allergy Swelling     walnuts     Immunization History   Administered Date(s) Administered   • COVID-19 MODERNA VACC 0.5 ML IM 07/05/2021, 08/07/2021   • INFLUENZA 08/30/2022   • Influenza, injectable, quadrivalent, preservative free 0.5 mL 08/30/2022, 10/30/2023   • Influenza, recombinant, quadrivalent,injectable, preservative free 10/04/2021       Objective     /62   Pulse 77   Temp (!) 96.8 °F (36 °C)   Ht 5' 6\" (1.676 m)   Wt 127 kg (281 lb)   LMP 09/17/1999 (Exact Date)   SpO2 98%   BMI 45.35 kg/m²     Physical Exam  Vitals and nursing note reviewed.   Constitutional:       General: She is not in acute distress.     Appearance: Normal appearance.   HENT:      Head: Normocephalic and atraumatic.      Nose: Nose normal.   Eyes:      Pupils: Pupils are equal, round, and reactive to light.   Cardiovascular:      Rate and Rhythm: Normal rate and regular rhythm.      Heart sounds: Normal heart sounds. No murmur heard.  Pulmonary:      Effort: Pulmonary effort is normal. No respiratory distress.      Breath sounds: Normal breath sounds. No wheezing, rhonchi or rales.   Musculoskeletal:      Cervical back: Normal range of motion and neck supple.      Left knee: No bony tenderness. Decreased range of motion. Tenderness present over the lateral joint line.   Skin:     General: Skin is warm and dry.   Neurological:      Mental Status: She is alert and oriented to person, place, and time.   Psychiatric:         Mood and Affect: Mood and affect normal.       Sonny Estrada PA-C    "

## 2024-02-11 DIAGNOSIS — E03.9 HYPOTHYROIDISM, UNSPECIFIED TYPE: ICD-10-CM

## 2024-02-11 DIAGNOSIS — E11.9 TYPE 2 DIABETES MELLITUS WITHOUT COMPLICATION, WITHOUT LONG-TERM CURRENT USE OF INSULIN (HCC): ICD-10-CM

## 2024-02-12 RX ORDER — LEVOTHYROXINE SODIUM 175 UG/1
TABLET ORAL
Qty: 90 TABLET | Refills: 1 | Status: SHIPPED | OUTPATIENT
Start: 2024-02-12

## 2024-02-27 ENCOUNTER — OFFICE VISIT (OUTPATIENT)
Dept: OBGYN CLINIC | Facility: CLINIC | Age: 64
End: 2024-02-27
Payer: COMMERCIAL

## 2024-02-27 VITALS
HEIGHT: 66 IN | HEART RATE: 73 BPM | SYSTOLIC BLOOD PRESSURE: 126 MMHG | BODY MASS INDEX: 45.16 KG/M2 | DIASTOLIC BLOOD PRESSURE: 85 MMHG | WEIGHT: 281 LBS

## 2024-02-27 DIAGNOSIS — M25.562 LEFT KNEE PAIN, UNSPECIFIED CHRONICITY: ICD-10-CM

## 2024-02-27 DIAGNOSIS — M25.561 RIGHT KNEE PAIN, UNSPECIFIED CHRONICITY: ICD-10-CM

## 2024-02-27 DIAGNOSIS — M17.0 PRIMARY OSTEOARTHRITIS OF BOTH KNEES: Primary | ICD-10-CM

## 2024-02-27 PROCEDURE — 20610 DRAIN/INJ JOINT/BURSA W/O US: CPT | Performed by: ORTHOPAEDIC SURGERY

## 2024-02-27 PROCEDURE — 99214 OFFICE O/P EST MOD 30 MIN: CPT | Performed by: ORTHOPAEDIC SURGERY

## 2024-02-27 RX ORDER — METHYLPREDNISOLONE ACETATE 40 MG/ML
2 INJECTION, SUSPENSION INTRA-ARTICULAR; INTRALESIONAL; INTRAMUSCULAR; SOFT TISSUE
Status: COMPLETED | OUTPATIENT
Start: 2024-02-27 | End: 2024-02-27

## 2024-02-27 RX ORDER — LIDOCAINE HYDROCHLORIDE 10 MG/ML
2 INJECTION, SOLUTION INFILTRATION; PERINEURAL
Status: COMPLETED | OUTPATIENT
Start: 2024-02-27 | End: 2024-02-27

## 2024-02-27 RX ORDER — BUPIVACAINE HYDROCHLORIDE 2.5 MG/ML
2 INJECTION, SOLUTION INFILTRATION; PERINEURAL
Status: COMPLETED | OUTPATIENT
Start: 2024-02-27 | End: 2024-02-27

## 2024-02-27 RX ADMIN — BUPIVACAINE HYDROCHLORIDE 2 ML: 2.5 INJECTION, SOLUTION INFILTRATION; PERINEURAL at 10:30

## 2024-02-27 RX ADMIN — LIDOCAINE HYDROCHLORIDE 2 ML: 10 INJECTION, SOLUTION INFILTRATION; PERINEURAL at 10:30

## 2024-02-27 RX ADMIN — METHYLPREDNISOLONE ACETATE 2 ML: 40 INJECTION, SUSPENSION INTRA-ARTICULAR; INTRALESIONAL; INTRAMUSCULAR; SOFT TISSUE at 10:30

## 2024-02-27 NOTE — PROGRESS NOTES
Patient Name:  Tong Bolaños  MRN:  [id number]    Assessment & Plan     1. Primary osteoarthritis of both knees  -     Ambulatory Referral to Orthopedic Surgery  -     Ambulatory Referral to Physical Therapy; Future  -     Large joint arthrocentesis: R knee  -     Large joint arthrocentesis: L knee  -     Injection Procedure Prior Authorization; Future    2. Left knee pain, unspecified chronicity  -     Ambulatory Referral to Physical Therapy; Future  -     Large joint arthrocentesis: L knee    3. Right knee pain, unspecified chronicity  -     Ambulatory Referral to Physical Therapy; Future  -     Large joint arthrocentesis: R knee        Bilateral knee osteoarthritis with recently worsening pain  X-rays reviewed with patient  Discussed nonoperative management of bilateral knee osteoarthritis and she wished to move forward with bilateral knee CSI. Tolerated procedure well. Can repeat every 3 months as needed for pain relief.  Placed authorization for visco injections for bilateral knees. Advised patient would not perform earlier than 6 weeks from most recent CSI. Advised patient if she is feeling good in 3 months, she can hold off on visco injections  Order placed for physical therapy to work range of motion and strengthening  Continue OTC medication as needed  Educated patient about weight loss  Follow up 3 months    History of the Present Illness   Tong Bolaños is a 63 y.o. female with Bilateral knee osteoarthritis. Today, patient reports her Left knee is more painful than the Right. She had significant pain relief from the CSI performed on 10/19/2021. She locates pain to the medial aspect of bilateral knees, worse with ambulation and range of motion. She also admits to worsening of range of motion secondary to pain. She ambulates with cane at baseline. Pain 8/10.        Review of Systems     Review of Systems   Constitutional:  Negative for chills and fever.   HENT:  Negative for congestion.    Respiratory:  " Negative for cough, chest tightness and shortness of breath.    Cardiovascular:  Negative for chest pain and palpitations.   Gastrointestinal:  Negative for abdominal pain.   Endocrine: Negative for cold intolerance and heat intolerance.   Neurological:  Negative for syncope.   Psychiatric/Behavioral:  Negative for confusion.        Physical Exam     /85   Pulse 73   Ht 5' 6\" (1.676 m)   Wt 127 kg (281 lb)   LMP 09/17/1999 (Exact Date)   BMI 45.35 kg/m²     Right Knee  Range of motion from 0 to 70.    There is no effusion.    There is tenderness over the medial and lateral joint lines.    The patient is able to perform a straight leg raise.    Varus stress testing reveals no pain or instability at 0 and 30 degrees   Valgus stress testing reveals no pain or instability at 0 and 30 degrees  The patient is neurovascular intact distally.      Left Knee  Range of motion from 5 to 75.    There is no effusion.    There is tenderness over the medial and lateral joint lines.    The patient is able to perform a straight leg raise.    Varus stress testing reveals no pain or instability at 0 and 30 degrees   Valgus stress testing reveals no pain or instability at 0 and 30 degrees  The patient is neurovascular intact distally.    Data Review     I have personally reviewed pertinent films in PACS, and my interpretation follows.    X-rays taken 02/08/2024 of Left knee demonstrate moderate tricompartmental degenerative changes with associated osteophytes. No fracture or dislocation.    X-rays taken 100/19/2021 of Right knee demonstrate severe lateral compartment degenerative changes and joint space narrowing. Mild to moderate patellofemoral degenerative changes. No fracture or dislocation.    Social History     Tobacco Use    Smoking status: Never    Smokeless tobacco: Never   Vaping Use    Vaping status: Never Used   Substance Use Topics    Alcohol use: Never    Drug use: Never           Large joint arthrocentesis: R " knee  Universal Protocol:  Risks and benefits: risks, benefits and alternatives were discussed  Consent given by: patient  Patient identity confirmed: verbally with patient  Procedure Details  Location: knee - R knee  Needle size: 22 G  Approach: lateral  Medications administered: 2 mL bupivacaine 0.25 %; 2 mL lidocaine 1 %; 2 mL methylPREDNISolone acetate 40 mg/mL    Patient tolerance: patient tolerated the procedure well with no immediate complications  Dressing:  Sterile dressing applied      Large joint arthrocentesis: L knee  Universal Protocol:  Risks and benefits: risks, benefits and alternatives were discussed  Consent given by: patient  Patient identity confirmed: verbally with patient  Procedure Details  Location: knee - L knee  Needle size: 22 G  Approach: lateral  Medications administered: 2 mL bupivacaine 0.25 %; 2 mL lidocaine 1 %; 2 mL methylPREDNISolone acetate 40 mg/mL    Patient tolerance: patient tolerated the procedure well with no immediate complications  Dressing:  Sterile dressing applied            Syed Martins DO

## 2024-03-10 NOTE — PROGRESS NOTES
PT Evaluation     Today's date: 24  Patient name: Tong Bolaños  : 1960  MRN: [id number]  Referring provider: Kae Perez PA-C  Dx:   Encounter Diagnosis     ICD-10-CM    1. Primary osteoarthritis of both knees  M17.0 Ambulatory Referral to Physical Therapy      2. Left knee pain, unspecified chronicity  M25.562 Ambulatory Referral to Physical Therapy      3. Right knee pain, unspecified chronicity  M25.561 Ambulatory Referral to Physical Therapy          Start Time: 1100  Stop Time: 1200  Total time in clinic (min): 60 minutes     Assessment  Assessment details: Tong Bolaños is a a pleasant 63 y.o. female who presents today with pain, decreased strength, decreased ROM, decreased joint mobility, joint effusion, ambulatory dysfunction, and balance dysfunction. The patient's clinical presentation is consistent with her diagnosis of BL Knee OA. Tong complains of aching and tight pain in bilateral knee ranging from 0/10 to 10/10. Pain is exacerbated by activity or standing and made better by medication or rest.    The patient demonstrates minimally decreased strength during resisted muscle testing. Pt ROM is significantly decreased with empty end feel.     The patient has difficulty with ambulation, transfers, dressing, leisure, and athletics. Patient will benefit from skilled physical therapy, including therapeutic exercise, stretching, balance training, manual therapy, and modalities prn to improve their level of function, to increase overall quality of life, and to address her impairments.    Dispensed home exercise program and educated patient on proper technique, frequency, and the possibility of DOMS for the next 24 to 48 hours. Patient demonstrated understanding and was advised to call us if she has any further questions. Patient was advised to always use a SPC due to being at higher risk for falls.  Impairments: abnormal coordination, abnormal gait, abnormal muscle firing, abnormal or  restricted ROM, abnormal movement, activity intolerance, impaired balance, impaired physical strength, lacks appropriate home exercise program, pain with function, safety issue, weight-bearing intolerance, poor posture  and poor body mechanics  Understanding of Dx/Px/POC: excellent  Goals  ST. Independent with HEP in 2 weeks.   2. Pt will have verbal report of improvement in symptoms by >/=25% in 2 weeks.   3. Decrease pain to 7/10 at it's worst.   4. Increase strength by 1/2 grade in all deficient planes.     To be achieved by D/C   LT. Pt will improve FOTO score by >/= goal points in 6 weeks.   2. Pt will improve FOTO score to >/= goal score by visit # 12.   3. Pt will be able to stand for an hour with some difficulty.   4. Pt will be able to walk from room to room with little to no difficulty.   5. Pt will be able to perform her usual activities including bending and lifting with little to no difficulty.     Plan  Patient would benefit from: skilled PT  Planned modality interventions: cryotherapy, TENS and unattended electrical stimulation  Planned therapy interventions: activity modification, ADL retraining, balance, balance/weight bearing training, behavior modification, body mechanics training, functional ROM exercises, gait training, home exercise program, IADL retraining, joint mobilization, manual therapy, massage, neuromuscular re-education, patient education, strengthening, stretching, therapeutic activities, therapeutic exercise, transfer training, kinesiology taping and Brown taping  Frequency: 2x week  Duration in weeks: 8  Plan of Care beginning date: 3/12/2024  Plan of Care expiration date: 2024  Treatment plan discussed with: patient and family        Subjective Evaluation    History of Present Illness  Mechanism of injury: Tong presents today with complaints of bilateral knee pain that began years ago.    Mechanism of injury was atraumatic with chronic worsening. She notes that  she had an injection last week which helped a lot. She feels that she cannot bend her knee much at all.    Pt had X-ray performed on  that shows the following:    Moderate medial and lateral compartment space degenerative changes as above, increased in degree particularly in the medial compartment.  Small effusion.      The patient also reports diminished strength, decreased ROM, decreased balance, decreased endurance, and difficulty with function.    Relevant PMH includes hx of DM, Cardiac hx.  Patient Goals  Patient goals for therapy: decreased edema, decreased pain, improved balance, increased motion, return to sport/leisure activities, independence with ADLs/IADLs and increased strength    Pain  Current pain ratin  At best pain ratin  At worst pain rating: 10  Quality: tight and dull ache  Relieving factors: ice, medications and rest  Aggravating factors: standing, stair climbing and walking  Progression: improved        Objective     Tenderness   Left Knee   Tenderness in the lateral joint line and popliteal fossa.     Neurological Testing     Sensation     Knee   Left Knee   Intact: Light touch    Right Knee   Intact: light touch     Passive Range of Motion   Left Knee   Flexion: 90 degrees with pain  Extension: -10 degrees with pain    Right Knee   Flexion: 80 degrees with pain  Extension: 0 degrees with pain    Strength/Myotome Testing     Left Knee   Flexion: 4  Extension: 4+    Right Knee   Flexion: 4- (pain)  Extension: 4+    Functional Assessment        Comments  3/12/2024  5xSTS - 30s w/ UE  TUG - 31s w/ SPC  GARCIA - 40/56      General Comments:      Knee Comments  Little to no swelling at this time, but does have swelling at times. She wears compression socks during the day.         Precautions: Falls Risk    POC expires Unit limit Auth  expiration date PT/OT + Visit Limit?    BOMN 23 60                 Visit/Unit Tracking  AUTH Status:  Date 3/12              Approved Used 1                Remaining                       Manuals 3/12                                            Neuro Re-Ed                       SL stance EO                       SL stance EC                       Rockerboard                       Blue foam bal.                       StS walk on foam                       Heel toe walk on blue foam                       SL Clock                        Biodex Wt shift lat/AP                       Biodex % weightbearing                       GARCIA, 5xSTS, TUG TS                     Ther Ex                       Nustep for cardio/ROM  10' L1 5' w/ UE                     Pt Ed. - pathophysiology, HEP, prognosis, ambulation safety TS             Standing hip 3 way                       PB flexion                       HS stretch                       Step stretch                       Heel raises                       Matrix ext                       Matrix flexion                       Backwards walking                       Ther Activity                       STS/TG                       FSU                       LSU                       Lateral walk                       Monster walk                       Gait Training                                                                       Modalities

## 2024-03-12 ENCOUNTER — EVALUATION (OUTPATIENT)
Dept: PHYSICAL THERAPY | Facility: CLINIC | Age: 64
End: 2024-03-12
Payer: COMMERCIAL

## 2024-03-12 DIAGNOSIS — M25.562 LEFT KNEE PAIN, UNSPECIFIED CHRONICITY: ICD-10-CM

## 2024-03-12 DIAGNOSIS — M17.0 PRIMARY OSTEOARTHRITIS OF BOTH KNEES: Primary | ICD-10-CM

## 2024-03-12 DIAGNOSIS — M25.561 RIGHT KNEE PAIN, UNSPECIFIED CHRONICITY: ICD-10-CM

## 2024-03-12 PROCEDURE — 97112 NEUROMUSCULAR REEDUCATION: CPT

## 2024-03-12 PROCEDURE — 97161 PT EVAL LOW COMPLEX 20 MIN: CPT

## 2024-03-12 PROCEDURE — 97140 MANUAL THERAPY 1/> REGIONS: CPT

## 2024-03-14 ENCOUNTER — OFFICE VISIT (OUTPATIENT)
Dept: PHYSICAL THERAPY | Facility: CLINIC | Age: 64
End: 2024-03-14
Payer: COMMERCIAL

## 2024-03-14 DIAGNOSIS — M25.561 RIGHT KNEE PAIN, UNSPECIFIED CHRONICITY: ICD-10-CM

## 2024-03-14 DIAGNOSIS — M17.0 PRIMARY OSTEOARTHRITIS OF BOTH KNEES: Primary | ICD-10-CM

## 2024-03-14 DIAGNOSIS — M25.562 LEFT KNEE PAIN, UNSPECIFIED CHRONICITY: ICD-10-CM

## 2024-03-14 PROCEDURE — 97140 MANUAL THERAPY 1/> REGIONS: CPT

## 2024-03-14 PROCEDURE — 97110 THERAPEUTIC EXERCISES: CPT

## 2024-03-14 NOTE — PROGRESS NOTES
"Daily Note     Today's date: 3/14/2024  Patient name: Tong Bolaños  : 1960  MRN: [id number]  Referring provider: Kae Perez PA-C  Dx:   Encounter Diagnosis     ICD-10-CM    1. Primary osteoarthritis of both knees  M17.0       2. Left knee pain, unspecified chronicity  M25.562       3. Right knee pain, unspecified chronicity  M25.561                      Subjective: Patient reports minimal soreness after IE.       Objective: See treatment diary below      Assessment: Initiated POC with good tolerance. Most limited into flexion. Good quad activation. She did when with rhomberg TE progressing with minimal challenge, consider foam NV. Required 2 foam on chair to rise without UE support. Performed charted exercises with minimal increase from subjective baseline. Discussed performing heel slides with strap for ROM at home daily, she verbalized understanding. Education provided on DOMS, she verbalized understanding. Use of ice at home prn for pain relief. Patient demonstrated fatigue post treatment and would benefit from continued PT      Plan: Progress treatment as tolerated.       Precautions: Falls Risk    POC expires Unit limit Auth  expiration date PT/OT + Visit Limit?    BOMN 23 60                 Visit/Unit Tracking  AUTH Status:  Date 3/12 3/14             Approved Used 1 2              Remaining                       Manuals 3/12 3/14                    PROM    AZIZA to tolerance                                Neuro Re-Ed                       Rhomberg    FA/EO  30\"x1  FT/EO  30\"x1  FT/EC  30\"x1  foam NV                                        Rockerboard                       Blue foam bal.                       StS walk on foam                       Heel toe walk on blue foam                       SL Clock                        Biodex Wt shift lat/AP                       Biodex % weightbearing                       GARCIA, 5xSTS, TUG TS                     Ther Ex                     " "  Nustep for cardio/ROM  10' L1 5' w/ UE  10' L1 w/ UE                   Pt Ed. - pathophysiology, HEP, prognosis, ambulation safety TS             Standing hip 3 way                       Heel slides    active 3\" 20x B                   Supine hip abd  RTB 5\"20x           Supine hip add   5\" x20            SLR  NV           HS stretch                       Step stretch                       Heel raises                       Matrix ext    SAQ 3\" 10x B                   Matrix flexion                       Backwards walking                       Ther Activity                       STS/TG    STS 10x foam (2)                   FSU    NV                   LSU    NV                   Lateral walk                       Monster walk                       Gait Training                                                                       Modalities                                                                                "

## 2024-03-19 ENCOUNTER — OFFICE VISIT (OUTPATIENT)
Dept: PHYSICAL THERAPY | Facility: CLINIC | Age: 64
End: 2024-03-19
Payer: COMMERCIAL

## 2024-03-19 DIAGNOSIS — M25.562 LEFT KNEE PAIN, UNSPECIFIED CHRONICITY: ICD-10-CM

## 2024-03-19 DIAGNOSIS — M25.561 RIGHT KNEE PAIN, UNSPECIFIED CHRONICITY: ICD-10-CM

## 2024-03-19 DIAGNOSIS — M17.0 PRIMARY OSTEOARTHRITIS OF BOTH KNEES: Primary | ICD-10-CM

## 2024-03-19 PROCEDURE — 97140 MANUAL THERAPY 1/> REGIONS: CPT

## 2024-03-19 PROCEDURE — 97110 THERAPEUTIC EXERCISES: CPT

## 2024-03-19 NOTE — PROGRESS NOTES
"Daily Note    Today's date: 24  Patient name: Tong Bolaños  : 1960  MRN: [id number]  Referring provider: Kae Perez PA-C  Dx:   Encounter Diagnosis     ICD-10-CM    1. Primary osteoarthritis of both knees  M17.0       2. Left knee pain, unspecified chronicity  M25.562       3. Right knee pain, unspecified chronicity  M25.561           Start Time: 1030  Stop Time: 1115  Total time in clinic (min): 45 minutes      Subjective: Tong reports that she had severe cramping following previous session. It went away after some time, but the pain was so bad she considered calling an ambulance.    Objective: See treatment diary below.    Assessment: Tong tolerated treatment well with consistent cuing throughout. TE's were performed with decreased reps and decreased resistance. No new TE's were performed today. Following treatment, the patient demonstrated fatigue post treatment, exhibited good technique with therapeutic exercises, and would benefit from continued PT.     Plan: Continue per plan of care.        Precautions: Falls Risk    POC expires Unit limit Auth  expiration date PT/OT + Visit Limit?    BOMN 23 60                 Visit/Unit Tracking  AUTH Status:  Date 3/12 3/14 3/19            Approved Used 1 2 3             Remaining                       Manuals 3/12 3/14 3/19                  PROM    AZIZA to tolerance TS to tolerance                              Neuro Re-Ed                      Rhomberg    FA/EO  30\"x1  FT/EO  30\"x1  FT/EC  30\"x1 held                                       Rockerboard                      Blue foam bal.                      StS walk on foam                      Heel toe walk on blue foam                      SL Clock                       Biodex Wt shift lat/AP                      Biodex % weightbearing                      GARCIA, 5xSTS, TUG TS                    Ther Ex                      Nustep for cardio/ROM  10' L1 5' w/ UE  10' L1 w/ UE 10' L1 w/ UE " "                Pt Ed. - pathophysiology, HEP, prognosis, ambulation safety TS   TS - hydration, DOMS, cramping          Standing hip 3 way                      Heel slides    active 3\" 20x B HEP                  Supine hip abd  RTB 5\"20x RTB 2x10 no hold          Supine hip add   5\" x20  2x10 no hold          SLR  NV           HS stretch                      Step stretch                      Heel raises                      Matrix ext    SAQ 3\" 10x B SAQ 2x10 no hold                 Matrix flexion                      Backwards walking                      Ther Activity                      STS/TG    STS 10x foam (2)                  FSU    NV                  LSU    NV                  Lateral walk                      Monster walk                      Gait Training                                                                    Modalities                                                                               Chato Coy, PT  3/19/2024,10:34 AM  "

## 2024-03-21 ENCOUNTER — APPOINTMENT (OUTPATIENT)
Dept: PHYSICAL THERAPY | Facility: CLINIC | Age: 64
End: 2024-03-21
Payer: COMMERCIAL

## 2024-03-21 DIAGNOSIS — E03.9 HYPOTHYROIDISM, UNSPECIFIED TYPE: ICD-10-CM

## 2024-03-21 DIAGNOSIS — E78.2 MIXED HYPERLIPIDEMIA: ICD-10-CM

## 2024-03-21 DIAGNOSIS — I25.10 CORONARY ARTERY DISEASE INVOLVING NATIVE CORONARY ARTERY OF NATIVE HEART WITHOUT ANGINA PECTORIS: ICD-10-CM

## 2024-03-21 DIAGNOSIS — E11.9 TYPE 2 DIABETES MELLITUS WITHOUT COMPLICATION, WITHOUT LONG-TERM CURRENT USE OF INSULIN (HCC): ICD-10-CM

## 2024-03-21 DIAGNOSIS — I10 ESSENTIAL HYPERTENSION: ICD-10-CM

## 2024-03-21 RX ORDER — CARVEDILOL 12.5 MG/1
12.5 TABLET ORAL 2 TIMES DAILY
Qty: 60 TABLET | Refills: 1 | Status: SHIPPED | OUTPATIENT
Start: 2024-03-21 | End: 2024-04-20

## 2024-03-21 RX ORDER — EZETIMIBE AND SIMVASTATIN 10; 40 MG/1; MG/1
1 TABLET ORAL
Qty: 90 TABLET | Refills: 1 | Status: SHIPPED | OUTPATIENT
Start: 2024-03-21

## 2024-03-21 RX ORDER — LEVOTHYROXINE SODIUM 175 UG/1
TABLET ORAL
Qty: 90 TABLET | Refills: 1 | Status: SHIPPED | OUTPATIENT
Start: 2024-03-21

## 2024-03-21 RX ORDER — LISINOPRIL 20 MG/1
20 TABLET ORAL DAILY
Qty: 90 TABLET | Refills: 1 | Status: SHIPPED | OUTPATIENT
Start: 2024-03-21

## 2024-03-25 ENCOUNTER — OFFICE VISIT (OUTPATIENT)
Dept: PHYSICAL THERAPY | Facility: CLINIC | Age: 64
End: 2024-03-25
Payer: COMMERCIAL

## 2024-03-25 DIAGNOSIS — M17.0 PRIMARY OSTEOARTHRITIS OF BOTH KNEES: Primary | ICD-10-CM

## 2024-03-25 DIAGNOSIS — M25.561 RIGHT KNEE PAIN, UNSPECIFIED CHRONICITY: ICD-10-CM

## 2024-03-25 DIAGNOSIS — M25.562 LEFT KNEE PAIN, UNSPECIFIED CHRONICITY: ICD-10-CM

## 2024-03-25 PROCEDURE — 97140 MANUAL THERAPY 1/> REGIONS: CPT

## 2024-03-25 PROCEDURE — 97110 THERAPEUTIC EXERCISES: CPT

## 2024-03-25 NOTE — PROGRESS NOTES
"Daily Note     Today's date: 3/25/2024  Patient name: Tong Bolaños  : 1960  MRN: [id number]  Referring provider: Kae Perez PA-C  Dx:   Encounter Diagnosis     ICD-10-CM    1. Primary osteoarthritis of both knees  M17.0       2. Left knee pain, unspecified chronicity  M25.562       3. Right knee pain, unspecified chronicity  M25.561           Start Time: 1115  Stop Time: 1153  Total time in clinic (min): 38 minutes    Subjective: pt noted that last time she did not have any cramps in her b/l knees/ LE's.       Objective: See treatment diary below      Assessment:  Continued with treatment session with focus on b/l knees. Pt was leon to complete all exercises with verbal cues for proper guidance. Tolerated treatment well. Patient exhibited good technique with therapeutic exercises and would benefit from continued PT.s/p treatment session reported feeling good and having no increase in pain.    Received a RTB for HEP use at this time.     Plan: Continue per plan of care.      Precautions: Falls Risk    POC expires Unit limit Auth  expiration date PT/OT + Visit Limit?    BOMN 23 60                 Visit/Unit Tracking  AUTH Status:  Date 3/12 3/14 3/19 3/25           Approved Used 1 2 3 4            Remaining                       Manuals 3/12 3/14 3/19  3/25                PROM R knee.     AZIZA to tolerance TS to tolerance  SC to tolerance b/l                             Neuro Re-Ed                      Rhomberg    FA/EO  30\"x1  FT/EO  30\"x1  FT/EC  30\"x1 held                                       Rockerboard                      Blue foam bal.                      StS walk on foam                      Heel toe walk on blue foam                      SL Clock                       Biodex Wt shift lat/AP                      Biodex % weightbearing                      GARCIA, 5xSTS, TUG TS                    Ther Ex                      Nustep for cardio/ROM  10' L1 5' w/ UE  10' L1 w/ UE 10' L1 " "w/ UE  L4 10 min w/ UE at 9                Pt Ed. - pathophysiology, HEP, prognosis, ambulation safety TS   TS - hydration, DOMS, cramping          Standing hip 3 way                      Heel slides    active 3\" 20x B HEP                  Supine hip abd  RTB 5\"20x RTB 2x10 no hold RTB 2x 10          Supine hip add   5\" x20  2x10 no hold 2x 10 no hold          SLR  NV           HS stretch                      Step stretch                      Heel raises                      Matrix ext    SAQ 3\" 10x B SAQ 2x10 no hold  SAQ 2x 10 no hold.                Matrix flexion                      Backwards walking                      Ther Activity                      STS/TG    STS 10x foam (2)                  FSU    NV                  LSU    NV                  Lateral walk                      Monster walk                      Gait Training                                                                    Modalities                                                                                 "

## 2024-03-28 ENCOUNTER — OFFICE VISIT (OUTPATIENT)
Dept: PHYSICAL THERAPY | Facility: CLINIC | Age: 64
End: 2024-03-28
Payer: COMMERCIAL

## 2024-03-28 DIAGNOSIS — M25.562 LEFT KNEE PAIN, UNSPECIFIED CHRONICITY: ICD-10-CM

## 2024-03-28 DIAGNOSIS — M17.0 PRIMARY OSTEOARTHRITIS OF BOTH KNEES: Primary | ICD-10-CM

## 2024-03-28 DIAGNOSIS — M25.561 RIGHT KNEE PAIN, UNSPECIFIED CHRONICITY: ICD-10-CM

## 2024-03-28 PROCEDURE — 97112 NEUROMUSCULAR REEDUCATION: CPT

## 2024-03-28 PROCEDURE — 97110 THERAPEUTIC EXERCISES: CPT

## 2024-03-28 PROCEDURE — 97140 MANUAL THERAPY 1/> REGIONS: CPT

## 2024-03-28 NOTE — PROGRESS NOTES
"Daily Note    Today's date: 24  Patient name: Tong Bolaños  : 1960  MRN: [id number]  Referring provider: Kae Perez PA-C  Dx:   Encounter Diagnosis     ICD-10-CM    1. Primary osteoarthritis of both knees  M17.0       2. Left knee pain, unspecified chronicity  M25.562       3. Right knee pain, unspecified chronicity  M25.561           Start Time: 1414  Stop Time: 1452  Total time in clinic (min): 38 minutes      Subjective: Tong reports that she has been feeling better since starting therapy. She notes that she went up stairs much easier this week than she has in years.    Objective: See treatment diary below.    Assessment: Tong tolerated treatment well with consistent cuing throughout. TE's were performed with increased reps and increased resistance. New TE's were demonstrated with proper technique, and tolerated well. Following treatment, the patient demonstrated fatigue and would benefit from continued physical therapy.    Plan: Continue per plan of care.  Progress treatment as tolerated.         Precautions: Falls Risk    POC expires Unit limit Auth  expiration date PT/OT + Visit Limit?    BOMN 23 60                 Visit/Unit Tracking  AUTH Status:  Date 3/12 3/14 3/19 3/25 3/28          Approved Used 1 2 3 4 5           Remaining                       Manuals 3/12 3/14 3/19  3/25  3/28              PROM R knee.     AZIZA to tolerance TS to tolerance  SC to tolerance b/l   TS BL                          Neuro Re-Ed                      Rhomberg    FA/EO  30\"x1  FT/EO  30\"x1  FT/EC  30\"x1 held                                       Rockerboard         2'/2'              Blue foam bal.                      StS walk on foam                      Heel toe walk on blue foam                     SL Clock                       Biodex Wt shift lat/AP                      Biodex % weightbearing                      GARCIA, 5xSTS, TUG TS                    Ther Ex                    " "  Nustep for cardio/ROM  10' L1 5' w/ UE  10' L1 w/ UE 10' L1 w/ UE  L4 10 min w/ UE at 9   10' L4 UE at 9             Pt Ed. - pathophysiology, HEP, prognosis, ambulation safety TS   TS - hydration, DOMS, cramping          Standing hip 3 way         3x10 ea              Heel slides    active 3\" 20x B HEP                  Supine hip abd  RTB 5\"20x RTB 2x10 no hold RTB 2x 10          Supine hip add   5\" x20  2x10 no hold 2x 10 no hold          SLR  NV           HS stretch                      Calf str SB     90\"         Step stretch         10x10\"              Heel raises                      Matrix ext    SAQ 3\" 10x B SAQ 2x10 no hold  SAQ 2x 10 no hold.                Matrix flexion                      Backwards walking                      Ther Activity                      STS/TG    STS 10x foam (2)                  FSU    NV                  LSU    NV                  Lateral walk                      Monster walk                      Gait Training                                                                    Modalities                                                                                   Chato Coy, PT  3/28/2024,3:08 PM  "

## 2024-03-29 ENCOUNTER — APPOINTMENT (OUTPATIENT)
Dept: PHYSICAL THERAPY | Facility: CLINIC | Age: 64
End: 2024-03-29
Payer: COMMERCIAL

## 2024-03-30 LAB
ALBUMIN SERPL-MCNC: 4 G/DL (ref 3.6–5.1)
ALBUMIN/GLOB SERPL: 1.7 (CALC) (ref 1–2.5)
ALP SERPL-CCNC: 85 U/L (ref 37–153)
ALT SERPL-CCNC: 33 U/L (ref 6–29)
AST SERPL-CCNC: 18 U/L (ref 10–35)
BASOPHILS # BLD AUTO: 32 CELLS/UL (ref 0–200)
BASOPHILS NFR BLD AUTO: 0.5 %
BILIRUB SERPL-MCNC: 0.4 MG/DL (ref 0.2–1.2)
BUN SERPL-MCNC: 17 MG/DL (ref 7–25)
BUN/CREAT SERPL: ABNORMAL (CALC) (ref 6–22)
CALCIUM SERPL-MCNC: 9.6 MG/DL (ref 8.6–10.4)
CHLORIDE SERPL-SCNC: 107 MMOL/L (ref 98–110)
CHOLEST SERPL-MCNC: 160 MG/DL
CHOLEST/HDLC SERPL: 2.5 (CALC)
CO2 SERPL-SCNC: 29 MMOL/L (ref 20–32)
CREAT SERPL-MCNC: 0.64 MG/DL (ref 0.5–1.05)
EOSINOPHIL # BLD AUTO: 101 CELLS/UL (ref 15–500)
EOSINOPHIL NFR BLD AUTO: 1.6 %
ERYTHROCYTE [DISTWIDTH] IN BLOOD BY AUTOMATED COUNT: 14.7 % (ref 11–15)
GFR/BSA.PRED SERPLBLD CYS-BASED-ARV: 100 ML/MIN/1.73M2
GLOBULIN SER CALC-MCNC: 2.4 G/DL (CALC) (ref 1.9–3.7)
GLUCOSE SERPL-MCNC: 106 MG/DL (ref 65–99)
HBA1C MFR BLD: 6.9 % OF TOTAL HGB
HCT VFR BLD AUTO: 36.6 % (ref 35–45)
HDLC SERPL-MCNC: 65 MG/DL
HGB BLD-MCNC: 11.6 G/DL (ref 11.7–15.5)
LDLC SERPL CALC-MCNC: 76 MG/DL (CALC)
LYMPHOCYTES # BLD AUTO: 1827 CELLS/UL (ref 850–3900)
LYMPHOCYTES NFR BLD AUTO: 29 %
MCH RBC QN AUTO: 29.1 PG (ref 27–33)
MCHC RBC AUTO-ENTMCNC: 31.7 G/DL (ref 32–36)
MCV RBC AUTO: 91.7 FL (ref 80–100)
MONOCYTES # BLD AUTO: 617 CELLS/UL (ref 200–950)
MONOCYTES NFR BLD AUTO: 9.8 %
NEUTROPHILS # BLD AUTO: 3723 CELLS/UL (ref 1500–7800)
NEUTROPHILS NFR BLD AUTO: 59.1 %
NONHDLC SERPL-MCNC: 95 MG/DL (CALC)
PLATELET # BLD AUTO: 155 THOUSAND/UL (ref 140–400)
PMV BLD REES-ECKER: 12.7 FL (ref 7.5–12.5)
POTASSIUM SERPL-SCNC: 5.1 MMOL/L (ref 3.5–5.3)
PROT SERPL-MCNC: 6.4 G/DL (ref 6.1–8.1)
RBC # BLD AUTO: 3.99 MILLION/UL (ref 3.8–5.1)
SODIUM SERPL-SCNC: 142 MMOL/L (ref 135–146)
TRIGL SERPL-MCNC: 109 MG/DL
WBC # BLD AUTO: 6.3 THOUSAND/UL (ref 3.8–10.8)

## 2024-04-01 DIAGNOSIS — E03.9 HYPOTHYROIDISM, UNSPECIFIED TYPE: Primary | ICD-10-CM

## 2024-04-01 NOTE — PROGRESS NOTES
"Daily Note     Today's date: 2024  Patient name: Tong Bolaños  : 1960  MRN: [id number]  Referring provider: Kae Perez PA-C  Dx:   Encounter Diagnosis     ICD-10-CM    1. Primary osteoarthritis of both knees  M17.0       2. Left knee pain, unspecified chronicity  M25.562       3. Right knee pain, unspecified chronicity  M25.561                      Subjective: Patient states the pain in her knees has been gradually improving.  She has also noticed improved mobility in her knees.      Objective: See treatment diary below      Assessment: Tolerated treatment well. Patient demonstrated fatigue post treatment and would benefit from continued PT.  Stiffness persists in bilateral knees.  Initiated Step ups F/L today.  VC's to avoid compensation with lifting leg onto step.      Plan: Continue per plan of care.  Progress treatment as tolerated.       Precautions: Falls Risk    POC expires Unit limit Auth  expiration date PT/OT + Visit Limit?    BOMN 23 60                 Visit/Unit Tracking  AUTH Status:  Date 3/12 3/14 3/19 3/25 3/28 4/2         Approved Used 1 2 3 4 5 6          Remaining                       Manuals 3/12 3/14 3/19  3/25  3/28  4/2            PROM R knee.     AZIZA to tolerance TS to tolerance  SC to tolerance b/l   TS BL  JF BL                        Neuro Re-Ed                      Rhomberg    FA/EO  30\"x1  FT/EO  30\"x1  FT/EC  30\"x1 held                                       Rockerboard         2'/2'              Blue foam bal.                      StS walk on foam                      Heel toe walk on blue foam                     SL Clock                       Biodex Wt shift lat/AP                      Biodex % weightbearing                      GARCIA, 5xSTS, TUG TS                    Ther Ex                      Nustep for cardio/ROM  10' L1 5' w/ UE  10' L1 w/ UE 10' L1 w/ UE  L4 10 min w/ UE at 9   10' L4 UE at 9  10' L4 UE at 9           Pt Ed. - pathophysiology, " "HEP, prognosis, ambulation safety TS   TS - hydration, DOMS, cramping          Standing hip 3 way         3x10 ea             Heel slides    active 3\" 20x B HEP                  Supine hip abd  RTB 5\"20x RTB 2x10 no hold RTB 2x 10   RTB 2x 10        Supine hip add   5\" x20  2x10 no hold 2x 10 no hold          SLR  NV    2x10 ea       HS stretch                      Calf str SB     90\"  90\"       Step stretch         10x10\"   10\"x10           Heel raises                      Matrix ext    SAQ 3\" 10x B SAQ 2x10 no hold  SAQ 2x 10 no hold.     SAQ 2x 10 no hold.           Matrix flexion                      Backwards walking                      Ther Activity                      STS/TG    STS 10x foam (2)       Declined           FSU    NV       6\" 2x10           LSU    NV       6\" 2x10           Lateral walk                      Monster walk                      Gait Training                                                                    Modalities                                                                                     "

## 2024-04-02 ENCOUNTER — OFFICE VISIT (OUTPATIENT)
Dept: PHYSICAL THERAPY | Facility: CLINIC | Age: 64
End: 2024-04-02
Payer: COMMERCIAL

## 2024-04-02 DIAGNOSIS — M25.562 LEFT KNEE PAIN, UNSPECIFIED CHRONICITY: ICD-10-CM

## 2024-04-02 DIAGNOSIS — M25.561 RIGHT KNEE PAIN, UNSPECIFIED CHRONICITY: ICD-10-CM

## 2024-04-02 DIAGNOSIS — M17.0 PRIMARY OSTEOARTHRITIS OF BOTH KNEES: Primary | ICD-10-CM

## 2024-04-02 PROCEDURE — 97530 THERAPEUTIC ACTIVITIES: CPT | Performed by: PHYSICAL THERAPIST

## 2024-04-02 PROCEDURE — 97110 THERAPEUTIC EXERCISES: CPT | Performed by: PHYSICAL THERAPIST

## 2024-04-04 ENCOUNTER — OFFICE VISIT (OUTPATIENT)
Dept: PHYSICAL THERAPY | Facility: CLINIC | Age: 64
End: 2024-04-04
Payer: COMMERCIAL

## 2024-04-04 DIAGNOSIS — M25.562 LEFT KNEE PAIN, UNSPECIFIED CHRONICITY: ICD-10-CM

## 2024-04-04 DIAGNOSIS — M25.561 RIGHT KNEE PAIN, UNSPECIFIED CHRONICITY: ICD-10-CM

## 2024-04-04 DIAGNOSIS — M17.0 PRIMARY OSTEOARTHRITIS OF BOTH KNEES: Primary | ICD-10-CM

## 2024-04-04 PROCEDURE — 97110 THERAPEUTIC EXERCISES: CPT

## 2024-04-04 PROCEDURE — 97140 MANUAL THERAPY 1/> REGIONS: CPT

## 2024-04-04 NOTE — PROGRESS NOTES
"Daily Note     Today's date: 2024  Patient name: Tong Bolaños  : 1960  MRN: [id number]  Referring provider: Kae Perez PA-C  Dx:   Encounter Diagnosis     ICD-10-CM    1. Primary osteoarthritis of both knees  M17.0       2. Left knee pain, unspecified chronicity  M25.562       3. Right knee pain, unspecified chronicity  M25.561           Start Time: 1135  Stop Time: 1213  Total time in clinic (min): 38 minutes    Subjective: Pt noted that she has some increase in LB tightness as well as some quad tightness on the L LE.       Objective: See treatment diary below      Assessment:  Continued with treatment session at this time with focus on L and R knees. Secondary to increase LBP as well as quad tightness Tolerated treatment well. Patient exhibited good technique with therapeutic exercises and would benefit from continued PT   No Questions comments concerns at the conclusion of the treatment session.     Plan: Continue per plan of care.      Precautions: Falls Risk    POC expires Unit limit Auth  expiration date PT/OT + Visit Limit?    BOMN 23 60                 Visit/Unit Tracking  AUTH Status:  Date 3/12 3/14 3/19 3/25 3/28 4/        Approved Used 1 2 3 4 5 6 7         Remaining                       Manuals 3/12 3/14 3/19  3/25  3/28  4/2  4/4          PROM R knee.     AZIZA to tolerance TS to tolerance  SC to tolerance b/l   TS BL  JF BL  SC                      Neuro Re-Ed                      Rhomberg    FA/EO  30\"x1  FT/EO  30\"x1  FT/EC  30\"x1 held                                       Rockerboard         2'/2'              Blue foam bal.                      StS walk on foam                      Heel toe walk on blue foam                     SL Clock                       Biodex Wt shift lat/AP                      Biodex % weightbearing                      GARCIA, 5xSTS, TUG TS                                                           Ther Ex                      Nustep for " "cardio/ROM  10' L1 5' w/ UE  10' L1 w/ UE 10' L1 w/ UE  L4 10 min w/ UE at 9   10' L4 UE at 9  10' L4 UE at 9  10 min L4 UE at 9          Pt Ed. - pathophysiology, HEP, prognosis, ambulation safety TS   TS - hydration, DOMS, cramping          Standing hip 3 way         3x10 ea             Heel slides    active 3\" 20x B HEP                  Supine hip abd  RTB 5\"20x RTB 2x10 no hold RTB 2x 10   RTB 2x 10  NV       Supine hip add   5\" x20  2x10 no hold 2x 10 no hold          SLR  NV    2x10 ea       HS stretch                      Calf str SB     90\"  90\" 90 \"       Step stretch         10x10\"   10\"x10  10\" x 10          Heel raises                      Matrix ext    SAQ 3\" 10x B SAQ 2x10 no hold  SAQ 2x 10 no hold.     SAQ 2x 10 no hold.           Matrix flexion                      Backwards walking                                                             Ther Activity                      STS/TG    STS 10x foam (2)       Declined           FSU    NV       6\" 2x10  NV         LSU    NV       6\" 2x10  NV         Lateral walk                      Monster walk                      Gait Training                                                                    Modalities                                                                                       "

## 2024-04-09 ENCOUNTER — APPOINTMENT (OUTPATIENT)
Dept: PHYSICAL THERAPY | Facility: CLINIC | Age: 64
End: 2024-04-09
Payer: COMMERCIAL

## 2024-04-11 ENCOUNTER — OFFICE VISIT (OUTPATIENT)
Dept: PHYSICAL THERAPY | Facility: CLINIC | Age: 64
End: 2024-04-11
Payer: COMMERCIAL

## 2024-04-11 DIAGNOSIS — M17.0 PRIMARY OSTEOARTHRITIS OF BOTH KNEES: Primary | ICD-10-CM

## 2024-04-11 DIAGNOSIS — M25.562 LEFT KNEE PAIN, UNSPECIFIED CHRONICITY: ICD-10-CM

## 2024-04-11 DIAGNOSIS — M25.561 RIGHT KNEE PAIN, UNSPECIFIED CHRONICITY: ICD-10-CM

## 2024-04-11 PROCEDURE — 97140 MANUAL THERAPY 1/> REGIONS: CPT | Performed by: PHYSICAL THERAPIST

## 2024-04-11 PROCEDURE — 97110 THERAPEUTIC EXERCISES: CPT | Performed by: PHYSICAL THERAPIST

## 2024-04-11 NOTE — PROGRESS NOTES
"Daily Note     Today's date: 2024  Patient name: Tong Bolaños  : 1960  MRN: [id number]  Referring provider: Kae Perez PA-C  Dx:   Encounter Diagnosis     ICD-10-CM    1. Primary osteoarthritis of both knees  M17.0       2. Left knee pain, unspecified chronicity  M25.562       3. Right knee pain, unspecified chronicity  M25.561                      Subjective: No new complaints.      Objective: See treatment diary below      Assessment: Tolerated treatment well. Patient demonstrated fatigue post treatment and would benefit from continued PT.  VC's to avoid hip circumduction with forward step ups on 6\" step.  Reports tightness right knee with PROM, but did have some discomfort at left lateral knee with PROM.  No residual soreness after session.      Plan: Continue per plan of care.  Progress treatment as tolerated.       Precautions: Falls Risk    POC expires Unit limit Auth  expiration date PT/OT + Visit Limit?    BOMN 23 60                 Visit/Unit Tracking  AUTH Status:  Date 3/12 3/14 3/19 3/25 3/28 4/2 4/4 4/11       Approved Used 1 2 3 4 5 6 7 8        Remaining                       Manuals 3/12 3/14 3/19  3/25  3/28  4/2  4/4  4/11        PROM R knee.     AZIZA to tolerance TS to tolerance  SC to tolerance b/l   TS BL  JF BL  SC  JF                    Neuro Re-Ed                      Rhomberg    FA/EO  30\"x1  FT/EO  30\"x1  FT/EC  30\"x1 held                                       Rockerboard         2'/2'              Blue foam bal.                      StS walk on foam                      Heel toe walk on blue foam                     SL Clock                       Biodex Wt shift lat/AP                      Biodex % weightbearing                      GARCIA, 5xSTS, TUG TS                                                           Ther Ex                      Nustep for cardio/ROM  10' L1 5' w/ UE  10' L1 w/ UE 10' L1 w/ UE  L4 10 min w/ UE at 9   10' L4 UE at 9  10' L4 UE at 9  " "10 min L4 UE at 9   10 min L4 UE at 9        Pt Ed. - pathophysiology, HEP, prognosis, ambulation safety TS   TS - hydration, DOMS, cramping          Standing hip 3 way         3x10 ea             Heel slides    active 3\" 20x B HEP                  Supine hip abd  RTB 5\"20x RTB 2x10 no hold RTB 2x 10   RTB 2x 10  NV       Supine hip add   5\" x20  2x10 no hold 2x 10 no hold          SLR  NV    2x10 ea       HS stretch                      Calf str SB     90\"  90\" 90 \"  90\"     Step stretch         10x10\"   10\"x10  10\" x 10   10\" x 10        Heel raises                      Matrix ext    SAQ 3\" 10x B SAQ 2x10 no hold  SAQ 2x 10 no hold.     SAQ 2x 10 no hold.    SAQ 2x 10 no hold.       Matrix flexion                      Backwards walking                                                             Ther Activity                      STS/TG    STS 10x foam (2)       Declined           FSU    NV       6\" 2x10  NV  6\" 2x10       LSU    NV       6\" 2x10  NV  6\" 2x10       Lateral walk                      Monster walk                      Gait Training                                                                    Modalities                                                                                         "

## 2024-04-16 ENCOUNTER — OFFICE VISIT (OUTPATIENT)
Dept: PHYSICAL THERAPY | Facility: CLINIC | Age: 64
End: 2024-04-16
Payer: COMMERCIAL

## 2024-04-16 DIAGNOSIS — M25.562 LEFT KNEE PAIN, UNSPECIFIED CHRONICITY: ICD-10-CM

## 2024-04-16 DIAGNOSIS — M25.561 RIGHT KNEE PAIN, UNSPECIFIED CHRONICITY: ICD-10-CM

## 2024-04-16 DIAGNOSIS — M17.0 PRIMARY OSTEOARTHRITIS OF BOTH KNEES: Primary | ICD-10-CM

## 2024-04-16 PROCEDURE — 97110 THERAPEUTIC EXERCISES: CPT

## 2024-04-16 PROCEDURE — 97530 THERAPEUTIC ACTIVITIES: CPT

## 2024-04-16 NOTE — PROGRESS NOTES
"Daily Note    Today's date: 24  Patient name: Tong Bolaños  : 1960  MRN: [id number]  Referring provider: Kae Perez PA-C  Dx:   Encounter Diagnosis     ICD-10-CM    1. Primary osteoarthritis of both knees  M17.0       2. Left knee pain, unspecified chronicity  M25.562       3. Right knee pain, unspecified chronicity  M25.561           Start Time: 1130  Stop Time: 1215  Total time in clinic (min): 45 minutes      Subjective: Tong reports that she walked about 2 blocks last and was sore in her thigh. She notes that she is getting better at doing stairs and feels more confident.    Objective: See treatment diary below.    Assessment: Tong tolerated treatment well with consistent cuing throughout. TE's were performed with increased reps and increased resistance. New TE's were demonstrated with proper technique, and tolerated well. Following treatment, the patient demonstrated fatigue and would benefit from continued physical therapy.    Plan: Continue per plan of care.  Progress treatment as tolerated.         Precautions: Falls Risk    POC expires Unit limit Auth  expiration date PT/OT + Visit Limit?    BOMN 23 60                 Visit/Unit Tracking  AUTH Status:  Date 3/12 3/14 3/19 3/25 3/28 4//      Approved Used 1 2 3 4 5 6 7 8 9       Remaining                       Manuals 3/12 3/14 3/19  3/25  3/28  4      PROM R knee.     AZIZA to tolerance TS to tolerance  SC to tolerance b/l   TS BL  JF BL  SC  JF                    Neuro Re-Ed                      Rhomberg    FA/EO  30\"x1  FT/EO  30\"x1  FT/EC  30\"x1 held                                       Rockerboard         2'/2'              Blue foam bal.                      StS walk on foam                      Heel toe walk on blue foam                     SL Clock                       Biodex Wt shift lat/AP                      Biodex % weightbearing                      GARCIA, 5xSTS, TUG TS        " "                                                   Ther Ex                      Nustep for cardio/ROM  10' L1 5' w/ UE  10' L1 w/ UE 10' L1 w/ UE  L4 10 min w/ UE at 9   10' L4 UE at 9  10' L4 UE at 9  10 min L4 UE at 9   10 min L4 UE at 9   10' L4 UE at 9     Pt Ed. - pathophysiology, HEP, prognosis, ambulation safety TS   TS - hydration, DOMS, cramping          Standing hip 3 way         3x10 ea             Heel slides    active 3\" 20x B HEP                  Supine hip abd  RTB 5\"20x RTB 2x10 no hold RTB 2x 10   RTB 2x 10  NV       Supine hip add   5\" x20  2x10 no hold 2x 10 no hold          SLR  NV    2x10 ea       HS stretch                 10x10\"      Calf str SB     90\"  90\" 90 \"  90\"     Step stretch         10x10\"   10\"x10  10\" x 10   10\" x 10   10x10\"      Heel raises                      Matrix ext    SAQ 3\" 10x B SAQ 2x10 no hold  SAQ 2x 10 no hold.     SAQ 2x 10 no hold.    SAQ 2x 10 no hold.  15# 3x10      Matrix flexion                 25# 3x10      Backwards walking                                                             Ther Activity                      STS/TG    STS 10x foam (2)       Declined           FSU    NV       6\" 2x10  NV  6\" 2x10  6\" 2x10      LSU    NV       6\" 2x10  NV  6\" 2x10  6\" 2x10      Lateral walk                      Monster walk                      Gait Training                                                                    Modalities                                                                                           Chato Coy, PT  4/16/2024,3:39 PM  "

## 2024-04-18 ENCOUNTER — EVALUATION (OUTPATIENT)
Dept: PHYSICAL THERAPY | Facility: CLINIC | Age: 64
End: 2024-04-18
Payer: COMMERCIAL

## 2024-04-18 DIAGNOSIS — M25.562 LEFT KNEE PAIN, UNSPECIFIED CHRONICITY: ICD-10-CM

## 2024-04-18 DIAGNOSIS — M17.0 PRIMARY OSTEOARTHRITIS OF BOTH KNEES: Primary | ICD-10-CM

## 2024-04-18 DIAGNOSIS — M25.561 RIGHT KNEE PAIN, UNSPECIFIED CHRONICITY: ICD-10-CM

## 2024-04-18 PROCEDURE — 97110 THERAPEUTIC EXERCISES: CPT

## 2024-04-18 PROCEDURE — 97140 MANUAL THERAPY 1/> REGIONS: CPT

## 2024-04-18 PROCEDURE — 97112 NEUROMUSCULAR REEDUCATION: CPT

## 2024-04-18 NOTE — PROGRESS NOTES
PT Evaluation     Today's date: 24  Patient name: Tong Bolaños  : 1960  MRN: [id number]  Referring provider: Kae Perez PA-C  Dx:   Encounter Diagnosis     ICD-10-CM    1. Primary osteoarthritis of both knees  M17.0       2. Left knee pain, unspecified chronicity  M25.562       3. Right knee pain, unspecified chronicity  M25.561           Start Time: 1215  Stop Time: 1300  Total time in clinic (min): 45 minutes     Assessment  Assessment details: Tong Bolaños is a pleasant 63 y.o. female who presents today for a re-evaluation of her bilateral knee pain. Tong complains of aching pain in both knee ranging from 0/10 to 6/10. Pain is exacerbated by activity, standing, or stairs and made better by ice or medication.    The patient demonstrates minimally decreased strength during resisted muscle testing, which has improved from initial evaluation. Pt ROM is moderately decreased with pain at end ranges.    The patient has difficulty with ambulation, transfers, sleeping, and athletics. Patient will benefit from continued skilled physical therapy, including therapeutic exercise, stretching, balance training, manual therapy, and modalities prn to improve their level of function, to increase overall quality of life, and to address her impairments.    Tong has met some of her goals at this time. Pt was instructed on her updated plan of care and wishes to continue therapy.    Impairments: abnormal coordination, abnormal gait, abnormal muscle firing, abnormal or restricted ROM, abnormal movement, activity intolerance, impaired balance, impaired physical strength, lacks appropriate home exercise program, pain with function, safety issue, weight-bearing intolerance, poor posture  and poor body mechanics  Understanding of Dx/Px/POC: excellent  Goals  ST. Independent with HEP in 2 weeks.   2. Pt will have verbal report of improvement in symptoms by >/=25% in 2 weeks.   3. Decrease pain to 7/10 at  it's worst.   4. Increase strength by 1/2 grade in all deficient planes.     To be achieved by D/C   LT. Pt will improve FOTO score by >/= goal points in 6 weeks.   2. Pt will improve FOTO score to >/= goal score by visit # 12.   3. Pt will be able to stand for an hour with some difficulty.   4. Pt will be able to walk from room to room with little to no difficulty.   5. Pt will be able to perform her usual activities including bending and lifting with little to no difficulty.   6. Pt will improve Sampson Balance Score from 40 to 45 (MDC = 3-5)  7. Pt will improve five times sit to stand time from 30 to 12 seconds or less  8. Pt will improve TUG time from 30 to 13.5 seconds or less. (MDC 3 - 5 seconds)      Plan  Patient would benefit from: skilled PT  Planned modality interventions: cryotherapy, TENS and unattended electrical stimulation  Planned therapy interventions: activity modification, ADL retraining, balance, balance/weight bearing training, behavior modification, body mechanics training, functional ROM exercises, gait training, home exercise program, IADL retraining, joint mobilization, manual therapy, massage, neuromuscular re-education, patient education, strengthening, stretching, therapeutic activities, therapeutic exercise, transfer training, kinesiology taping and Brown taping  Frequency: 2x week  Duration in weeks: 8  Plan of Care beginning date: 2024  Plan of Care expiration date: 2024  Treatment plan discussed with: patient and family        Subjective Evaluation    History of Present Illness  Mechanism of injury: surgery  Mechanism of injury: Tong reports that she is feeling better since starting therapy a month ago.    she notes much less difficulty with her usual tasks including stairs, bending, dressing, and sleeping. She is still using a SPC in the community, but does not use it at home. She only uses it in the mornings when she is sore. She feels that a lot of her pain is  "weather-dependent.    she notes 6/10 pain at this time.     she notes adherence to HEP and would like to continue with therapy.    Patient Goals  Patient goals for therapy: decreased pain, improved balance, increased motion, return to sport/leisure activities, independence with ADLs/IADLs and increased strength    Pain  Current pain ratin  At best pain ratin  At worst pain ratin  Quality: tight and dull ache  Relieving factors: rest and ice  Aggravating factors: standing, stair climbing and walking  Progression: improved        Objective     Neurological Testing     Sensation     Knee   Left Knee   Intact: Light touch    Right Knee   Intact: light touch     Passive Range of Motion   Left Knee   Flexion: 98 degrees with pain  Extension: 0 degrees     Right Knee   Flexion: 90 degrees with pain  Extension: 0 degrees     Strength/Myotome Testing     Left Knee   Flexion: 4  Extension: 4+    Right Knee   Flexion: 4  Extension: 4+    Functional Assessment        Comments  3/12/2024  5xSTS - 30s w/ UE  TUG - 31s w/ SPC  GARCIA - 40/56    2024  5xSTS - 23s w/ UE  TUG - 15s w/ SPC  GARCIA - 49/56             Precautions: Falls Risk    POC expires Unit limit Auth  expiration date PT/OT + Visit Limit?    BOMN 23 60                 Visit/Unit Tracking  AUTH Status:  Date 3/12 3/14 3/19 3/25 3/28 4/2 4/4 4/11 4/16 4/18     Approved Used 1 2 3 4 5 6 7 8 9 10      Remaining                       Manuals 3/12 3/14 3/19  3/25  3/28  4/    PROM R knee.     AZIZA to tolerance TS to tolerance  SC to tolerance b/l   TS BL  JF BL  SC  JF       Re-eval          TS   Neuro Re-Ed                      Rhomberg    FA/EO  30\"x1  FT/EO  30\"x1  FT/EC  30\"x1 held                                       Rockerboard         2'/2'              Blue foam bal.                      StS walk on foam                      Heel toe walk on blue foam                     SL Clock                       Biodex Wt shift " "lat/AP                      Biodex % weightbearing                      GARCIA, 5xSTS, TUG TS                 TS                                          Ther Ex                      Nustep for cardio/ROM  10' L1 5' w/ UE  10' L1 w/ UE 10' L1 w/ UE  L4 10 min w/ UE at 9   10' L4 UE at 9  10' L4 UE at 9  10 min L4 UE at 9   10 min L4 UE at 9   10' L4 UE at 9  10' L4 UE at 9   Pt Ed. - pathophysiology, HEP, prognosis, ambulation safety TS   TS - hydration, DOMS, cramping          Standing hip 3 way         3x10 ea             Heel slides    active 3\" 20x B HEP                  Supine hip abd  RTB 5\"20x RTB 2x10 no hold RTB 2x 10   RTB 2x 10  NV       Supine hip add   5\" x20  2x10 no hold 2x 10 no hold          SLR  NV    2x10 ea       HS stretch                 10x10\"      Calf str SB     90\"  90\" 90 \"  90\"     Step stretch         10x10\"   10\"x10  10\" x 10   10\" x 10   10x10\"      Heel raises                      Matrix ext    SAQ 3\" 10x B SAQ 2x10 no hold  SAQ 2x 10 no hold.     SAQ 2x 10 no hold.    SAQ 2x 10 no hold.  15# 3x10      Matrix flexion                 25# 3x10      Backwards walking                                                             Ther Activity                      STS/TG    STS 10x foam (2)       Declined           FSU    NV       6\" 2x10  NV  6\" 2x10  6\" 2x10      LSU    NV       6\" 2x10  NV  6\" 2x10  6\" 2x10      Lateral walk                      Monster walk                      Gait Training                                                                    Modalities                                                                         "

## 2024-04-22 ENCOUNTER — OFFICE VISIT (OUTPATIENT)
Dept: PHYSICAL THERAPY | Facility: CLINIC | Age: 64
End: 2024-04-22
Payer: COMMERCIAL

## 2024-04-22 DIAGNOSIS — M17.0 PRIMARY OSTEOARTHRITIS OF BOTH KNEES: Primary | ICD-10-CM

## 2024-04-22 DIAGNOSIS — M25.562 LEFT KNEE PAIN, UNSPECIFIED CHRONICITY: ICD-10-CM

## 2024-04-22 DIAGNOSIS — M25.561 RIGHT KNEE PAIN, UNSPECIFIED CHRONICITY: ICD-10-CM

## 2024-04-22 PROCEDURE — 97110 THERAPEUTIC EXERCISES: CPT | Performed by: PHYSICAL THERAPIST

## 2024-04-22 NOTE — PROGRESS NOTES
Daily Note     Today's date: 2024  Patient name: Tong Bolaños  : 1960  MRN: [id number]  Referring provider: Kae Perez PA-C  Dx:   Encounter Diagnosis     ICD-10-CM    1. Primary osteoarthritis of both knees  M17.0       2. Left knee pain, unspecified chronicity  M25.562       3. Right knee pain, unspecified chronicity  M25.561           Start Time: 1200  Stop Time: 1238  Total time in clinic (min): 38 minutes    Subjective: Patient offers no significant changes to overall status since last visit.      Objective: See treatment diary below      Assessment: Tolerated treatment well. Patient demonstrated fatigue post treatment and would benefit from continued PT. No significant changes to overall status. Patient felt more confident attempting the total gym.      Plan: Continue per plan of care.  Progress treatment as tolerated.       Precautions: Falls Risk    POC expires Unit limit Auth  expiration date PT/OT + Visit Limit?    BOMN 23 60                 Visit/Unit Tracking  AUTH Status:  Date 3/12 3/14 3/19 3/25 3/28 4/ 4/    Approved Used 1 2 3 4 5 6 7 8 9 10 11     Remaining                       Manuals 4/22  3/19  3/25  3/28  4/2  4/4  4/11  4/16  4/18    PROM R knee.    TS to tolerance  SC to tolerance b/l   TS BL  JF BL  SC  JF       Re-eval          TS   Neuro Re-Ed                    Rhomberg   held                                       Rockerboard         2'/2'              Blue foam bal.                      StS walk on foam                      Heel toe walk on blue foam                     SL Clock                       Biodex Wt shift lat/AP                      Biodex % weightbearing                      GARCIA, 5xSTS, TUG                  TS                                          Ther Ex                      Nustep for cardio/ROM L4 10 min UE at 9  10' L1 w/ UE  L4 10 min w/ UE at 9   10' L4 UE at 9  10' L4 UE at 9  10 min L4 UE at 9   10 min L4 UE  "at 9   10' L4 UE at 9  10' L4 UE at 9   Pt Ed. - pathophysiology, HEP, prognosis, ambulation safety   TS - hydration, DOMS, cramping          Standing hip 3 way       3x10 ea             Heel slides   HEP                  Supine hip abd   RTB 2x10 no hold RTB 2x 10   RTB 2x 10  NV       Supine hip add    2x10 no hold 2x 10 no hold          SLR      2x10 ea       HS stretch 10x10\" b/l              10x10\"      Calf str SB 10x10\"    90\"  90\" 90 \"  90\"     Step stretch       10x10\"   10\"x10  10\" x 10   10\" x 10   10x10\"      Heel raises                    Matrix ext 15# 3x10  SAQ 2x10 no hold  SAQ 2x 10 no hold.     SAQ 2x 10 no hold.    SAQ 2x 10 no hold.  15# 3x10      Matrix flexion  25# 3x10               25# 3x10      Backwards walking                                                             Ther Activity                     STS/TG  L20 2x10 TG        Declined           FSU          6\" 2x10  NV  6\" 2x10  6\" 2x10      LSU          6\" 2x10  NV  6\" 2x10  6\" 2x10      Lateral walk                      Monster walk                      Gait Training                                                                    Modalities                                                                         "

## 2024-04-23 ENCOUNTER — APPOINTMENT (OUTPATIENT)
Dept: PHYSICAL THERAPY | Facility: CLINIC | Age: 64
End: 2024-04-23
Payer: COMMERCIAL

## 2024-04-24 NOTE — PROGRESS NOTES
Daily Note     Today's date: 2024  Patient name: Tong Bolaños  : 1960  MRN: [id number]  Referring provider: Kae Perez PA-C  Dx:   Encounter Diagnosis     ICD-10-CM    1. Primary osteoarthritis of both knees  M17.0       2. Left knee pain, unspecified chronicity  M25.562       3. Right knee pain, unspecified chronicity  M25.561                      Subjective: Patient states she was very tired after last session.  Is having some right posterior knee pain today.      Objective: See treatment diary below      Assessment: Tolerated treatment well. Patient demonstrated fatigue post treatment and would benefit from continued PT.  No reports of increased pain after session.  VC's for correct step up technique.      Plan: Continue per plan of care.  Progress treatment as tolerated.       Precautions: Falls Risk    POC expires Unit limit Auth  expiration date PT/OT + Visit Limit?    BOMN 23 60                 Visit/Unit Tracking  AUTH Status:  Date 3/12 3/14 3/19 3/25 3/28 4    Approved Used 1 2 3 4 5 6 7 8 9 10 11     Remaining                       Manuals 4/22 4/25 3/19  3/25  3/28  4/2  4/4  4/11  4/16  4/18    PROM R knee.    TS to tolerance  SC to tolerance b/l   TS BL  JF BL  SC  JF       Re-eval          TS   Neuro Re-Ed                    Rhomberg   held                                       Rockerboard         2'/2'              Blue foam bal.                      StS walk on foam                      Heel toe walk on blue foam                     SL Clock                       Biodex Wt shift lat/AP                      Biodex % weightbearing                      GARCIA, 5xSTS, TUG                  TS                                          Ther Ex                      Nustep for cardio/ROM L4 10 min UE at 9 L4 10 min UE at 9 10' L1 w/ UE  L4 10 min w/ UE at 9   10' L4 UE at 9  10' L4 UE at 9  10 min L4 UE at 9   10 min L4 UE at 9   10' L4 UE at 9  10' L4  "UE at 9   Pt Ed. - pathophysiology, HEP, prognosis, ambulation safety   TS - hydration, DOMS, cramping          Standing hip 3 way       3x10 ea             Heel slides   HEP                  Supine hip abd   RTB 2x10 no hold RTB 2x 10   RTB 2x 10  NV       Supine hip add    2x10 no hold 2x 10 no hold          SLR      2x10 ea       HS stretch 10x10\" b/l 10x10\" b/l             10x10\"      Calf str SB 10x10\" 10\"x10   90\"  90\" 90 \"  90\"     Step stretch       10x10\"   10\"x10  10\" x 10   10\" x 10   10x10\"      Heel raises                    Matrix ext 15# 3x10 15# 3x10 SAQ 2x10 no hold  SAQ 2x 10 no hold.     SAQ 2x 10 no hold.    SAQ 2x 10 no hold.  15# 3x10      Matrix flexion  25# 3x10   25# 3x10             25# 3x10      Backwards walking                                                             Ther Activity                     STS/TG  L20 2x10 TG  L20 2x10 TG       Declined           FSU   6\" 2x10        6\" 2x10  NV  6\" 2x10  6\" 2x10      LSU   6\" 2x10        6\" 2x10  NV  6\" 2x10  6\" 2x10      Lateral walk                      Monster walk                      Gait Training                                                                    Modalities                                                                           "

## 2024-04-25 ENCOUNTER — OFFICE VISIT (OUTPATIENT)
Dept: PHYSICAL THERAPY | Facility: CLINIC | Age: 64
End: 2024-04-25
Payer: COMMERCIAL

## 2024-04-25 DIAGNOSIS — M17.0 PRIMARY OSTEOARTHRITIS OF BOTH KNEES: Primary | ICD-10-CM

## 2024-04-25 DIAGNOSIS — M25.561 RIGHT KNEE PAIN, UNSPECIFIED CHRONICITY: ICD-10-CM

## 2024-04-25 DIAGNOSIS — M25.562 LEFT KNEE PAIN, UNSPECIFIED CHRONICITY: ICD-10-CM

## 2024-04-25 PROCEDURE — 97530 THERAPEUTIC ACTIVITIES: CPT | Performed by: PHYSICAL THERAPIST

## 2024-04-25 PROCEDURE — 97110 THERAPEUTIC EXERCISES: CPT | Performed by: PHYSICAL THERAPIST

## 2024-04-29 NOTE — PROGRESS NOTES
Episode resolved at this time, pt no longer attending therapy.    Chato Coy, PT  3:54 PM  4/29/2024

## 2024-04-30 ENCOUNTER — APPOINTMENT (OUTPATIENT)
Dept: PHYSICAL THERAPY | Facility: CLINIC | Age: 64
End: 2024-04-30
Payer: COMMERCIAL

## 2024-05-03 ENCOUNTER — OFFICE VISIT (OUTPATIENT)
Dept: ENDOCRINOLOGY | Facility: CLINIC | Age: 64
End: 2024-05-03
Payer: COMMERCIAL

## 2024-05-03 VITALS
BODY MASS INDEX: 45.87 KG/M2 | SYSTOLIC BLOOD PRESSURE: 118 MMHG | TEMPERATURE: 97.4 F | HEART RATE: 66 BPM | DIASTOLIC BLOOD PRESSURE: 80 MMHG | HEIGHT: 66 IN | OXYGEN SATURATION: 98 % | WEIGHT: 285.4 LBS

## 2024-05-03 DIAGNOSIS — E11.69 HYPERLIPIDEMIA ASSOCIATED WITH TYPE 2 DIABETES MELLITUS  (HCC): ICD-10-CM

## 2024-05-03 DIAGNOSIS — E66.01 TYPE 2 DIABETES MELLITUS WITH MORBID OBESITY (HCC): ICD-10-CM

## 2024-05-03 DIAGNOSIS — E89.0 HYPOTHYROIDISM FOLLOWING RADIOIODINE THERAPY: ICD-10-CM

## 2024-05-03 DIAGNOSIS — I15.2 HYPERTENSION COMPLICATING DIABETES  (HCC): ICD-10-CM

## 2024-05-03 DIAGNOSIS — E11.69 TYPE 2 DIABETES MELLITUS WITH MORBID OBESITY (HCC): ICD-10-CM

## 2024-05-03 DIAGNOSIS — E78.5 HYPERLIPIDEMIA ASSOCIATED WITH TYPE 2 DIABETES MELLITUS  (HCC): ICD-10-CM

## 2024-05-03 DIAGNOSIS — E55.9 VITAMIN D DEFICIENCY: ICD-10-CM

## 2024-05-03 DIAGNOSIS — E11.9 TYPE 2 DIABETES MELLITUS WITHOUT COMPLICATION, WITHOUT LONG-TERM CURRENT USE OF INSULIN (HCC): Primary | ICD-10-CM

## 2024-05-03 DIAGNOSIS — E11.59 HYPERTENSION COMPLICATING DIABETES  (HCC): ICD-10-CM

## 2024-05-03 PROBLEM — E03.9 HYPOTHYROIDISM: Status: RESOLVED | Noted: 2021-07-01 | Resolved: 2024-05-03

## 2024-05-03 PROCEDURE — 3079F DIAST BP 80-89 MM HG: CPT | Performed by: NURSE PRACTITIONER

## 2024-05-03 PROCEDURE — 3074F SYST BP LT 130 MM HG: CPT | Performed by: NURSE PRACTITIONER

## 2024-05-03 PROCEDURE — 99204 OFFICE O/P NEW MOD 45 MIN: CPT | Performed by: NURSE PRACTITIONER

## 2024-05-03 NOTE — PATIENT INSTRUCTIONS
Start eating a little something at 11am and take 1,000 mg of metformin. Continue 1,000 mg with dinner.   Drink plenty of water throughout the day.  Continue testing blood sugar daily.  Complete labs in July

## 2024-05-03 NOTE — ASSESSMENT & PLAN NOTE
BP well-controlled at 118/80.  Continue carvedilol 12.5 mg twice daily and 20 mg of lisinopril daily.  Lab Results   Component Value Date    HGBA1C 6.9 (H) 03/29/2024

## 2024-05-03 NOTE — ASSESSMENT & PLAN NOTE
Patient is relatively well-controlled however it is appropriate to aim for a hemoglobin A1c of 6-6.5% given her age and overall health. Counseled on pathophysiology of diabetes. Counseled on negative, long-term effects associated with uncontrolled diabetes including neuropathy, nephropathy, retinopathy, heart attack, and stroke.  We discussed initiating Januvia.  However, patient would prefer to focus on some dietary adjustments which we discussed at length.  She will start eating a light breakfast between 10 and 11 AM.  At that time, she will take 1000 mg of metformin.  She will take her second dose of metformin with dinner.  Counseled on the importance of increasing physical activity.  Counseled on the importance of remaining well-hydrated.  Continue to test blood sugars daily.  Patient is to notify me with persistent hyperglycemia or episodes of hypoglycemia.  Follow-up in 4 months.  Lab Results   Component Value Date    HGBA1C 6.9 (H) 03/29/2024

## 2024-05-03 NOTE — PROGRESS NOTES
New Patient Progress Note    Chief Complaint:  Diabetes follow up visit    Impression & Plan:    Problem List Items Addressed This Visit       Hypertension complicating diabetes  (HCC)     BP well-controlled at 118/80.  Continue carvedilol 12.5 mg twice daily and 20 mg of lisinopril daily.  Lab Results   Component Value Date    HGBA1C 6.9 (H) 03/29/2024            Type 2 diabetes mellitus without complication, without long-term current use of insulin (HCC) - Primary     Patient is relatively well-controlled however it is appropriate to aim for a hemoglobin A1c of 6-6.5% given her age and overall health. Counseled on pathophysiology of diabetes. Counseled on negative, long-term effects associated with uncontrolled diabetes including neuropathy, nephropathy, retinopathy, heart attack, and stroke.  We discussed initiating Januvia.  However, patient would prefer to focus on some dietary adjustments which we discussed at length.  She will start eating a light breakfast between 10 and 11 AM.  At that time, she will take 1000 mg of metformin.  She will take her second dose of metformin with dinner.  Counseled on the importance of increasing physical activity.  Counseled on the importance of remaining well-hydrated.  Continue to test blood sugars daily.  Patient is to notify me with persistent hyperglycemia or episodes of hypoglycemia.  Follow-up in 4 months.  Lab Results   Component Value Date    HGBA1C 6.9 (H) 03/29/2024            Relevant Orders    Hemoglobin A1C    Basic metabolic panel    Hyperlipidemia associated with type 2 diabetes mellitus  (HCC)     Managed by PCP.  Patient is currently taking ezetimibe-simvastatin 10-40 mg nightly at bedtime.  LDL slightly above goal at 95.  Lab Results   Component Value Date    HGBA1C 6.9 (H) 03/29/2024            Type 2 diabetes mellitus with morbid obesity (HCC)     Could consider the addition of GLP-1.  I would recommend Ozempic or Mounjaro.  Lab Results   Component  Value Date    HGBA1C 6.9 (H) 03/29/2024            Hypothyroidism following radioiodine therapy     Received 12.5 mCi of radioactive iodine-131 in 2017 for treatment of hyperthyroidism.  Now maintained on 175 mcg of levothyroxine daily.  She is taking this consistently and correctly.  Thyroid function is stable.          Other Visit Diagnoses       Vitamin D deficiency        Relevant Orders    Vitamin D 25 hydroxy            History of Present Illness:   Tong Bolaños is a 63 y.o. female with hypothyroidism and T2DM presenting to the office today to establish care.    Patient reports being diagnosed with T2DM approximately 10 years ago through routine blood work.     PFH significant for T2DM in both parents and grandparents. Her daughter is T1DM.    Past medical history significant for coronary artery disease (LAD stent), hypertension, hyperlipidemia, thrombocytopenia, and obesity.    Denies hospitalizations for diabetes.  Denies retinopathy, nephropathy, and neuropathy.  Denies symptoms of hyperglycemia.  Reports occasional symptoms of hypoglycemia with lightheadedness and tremor.       Hemoglobin A1C   Latest Ref Rng <5.7 % of total Hgb   10/30/2023 6.4    11/13/2023 6.6 (H)    3/29/2024 6.9 (H)       Legend:  (H) High    Patient is currently prescribed 1000 mg of metformin twice daily.  However, she reports she does not eat breakfast and therefore takes 1500 mg of metformin with dinner.    She does follow a healthy, lean diet.  Very rarely/mostly cooks.  She is relatively sedentary.  She admits that she does not drink enough water.  She prefers diet tea.  Admits to overnight snacking.    Testing blood sugars daily in the morning.  Reports fasting blood sugars range       Patient Active Problem List   Diagnosis    Coronary artery disease involving native coronary artery of native heart without angina pectoris    Presence of stent in LAD coronary artery    Hypertension complicating diabetes  (HCC)    Type  2 diabetes mellitus without complication, without long-term current use of insulin (HCC)    NOLASCO (dyspnea on exertion)    Class 3 severe obesity due to excess calories with serious comorbidity and body mass index (BMI) of 45.0 to 49.9 in adult (HCC)    Varicose veins of both lower extremities with pain    BRCA positive    History of anemia    Hyperlipidemia associated with type 2 diabetes mellitus  (HCC)    Type 2 diabetes mellitus with morbid obesity (HCC)    Primary osteoarthritis of both knees    Personal history of colonic polyps    Family history of colon cancer    Uterine leiomyoma    Thrombocytopenia (HCC)    Hypothyroidism following radioiodine therapy    Venous (peripheral) insufficiency    Anginal equivalent      Past Medical History:   Diagnosis Date    COVID-19 09/06/2023    Diabetes mellitus (HCC)     Disease of thyroid gland     Hypertension       Past Surgical History:   Procedure Laterality Date    CORONARY ANGIOPLASTY WITH STENT PLACEMENT      LAD      Family History   Problem Relation Age of Onset    Cervical cancer Mother         hysterectomy    Breast cancer Maternal Aunt         masectomy    Breast cancer Maternal Aunt         found lump in nipple    No Known Problems Sister     Diabetes Daughter     No Known Problems Maternal Grandmother     Prostate cancer Maternal Grandfather     Heart disease Paternal Aunt     Heart disease Paternal Aunt      Social History     Tobacco Use    Smoking status: Never    Smokeless tobacco: Never   Substance Use Topics    Alcohol use: Never     Allergies   Allergen Reactions    Kiwi Extract - Food Allergy Anaphylaxis    Shrimp (Diagnostic) - Food Allergy Anaphylaxis    Shellfish-Derived Products - Food Allergy Rash     All Seafood     Latex Hives    Nuts - Food Allergy Swelling     walnuts         Current Outpatient Medications:     albuterol (ProAir HFA) 90 mcg/act inhaler, Inhale 2 puffs every 6 (six) hours as needed for wheezing, Disp: 8.5 g, Rfl: 0    Aspirin  Low Dose 81 MG EC tablet, TAKE 1 TABLET BY MOUTH EVERY DAY, Disp: 90 tablet, Rfl: 1    Blood Glucose Monitoring Suppl (OneTouch Verio Reflect) w/Device KIT, Check blood sugars once daily. Please substitute with appropriate alternative as covered by patient's insurance. Dx: E11.65, Disp: 1 kit, Rfl: 0    carvedilol (COREG) 12.5 mg tablet, Take 1 tablet (12.5 mg total) by mouth 2 (two) times a day, Disp: 60 tablet, Rfl: 1    ezetimibe-simvastatin (VYTORIN) 10-40 mg per tablet, Take 1 tablet by mouth daily at bedtime, Disp: 90 tablet, Rfl: 1    fluticasone (FLONASE) 50 mcg/act nasal spray, 1 spray into each nostril daily, Disp: 9.9 mL, Rfl: 0    glucose blood (OneTouch Verio) test strip, Check blood sugars once daily. Please substitute with appropriate alternative as covered by patient's insurance. Dx: E11.65, Disp: 100 each, Rfl: 3    hydrochlorothiazide (HYDRODIURIL) 12.5 mg tablet, TAKE 1 TABLET BY MOUTH EVERY DAY, Disp: 90 tablet, Rfl: 3    levothyroxine 175 mcg tablet, TAKES 1 TABLET BY MOUTH DAILY AND ON SUNDAY 1.5 TABLETS. TOTAL OF 7.5 TABLETS WEEKLY., Disp: 90 tablet, Rfl: 1    lisinopril (ZESTRIL) 20 mg tablet, Take 1 tablet (20 mg total) by mouth daily, Disp: 90 tablet, Rfl: 1    metFORMIN (GLUCOPHAGE) 1000 MG tablet, Take 1 tablet (1,000 mg total) by mouth 2 (two) times a day with meals, Disp: 180 tablet, Rfl: 1    OneTouch Delica Lancets 33G MISC, Check blood sugars once daily. Please substitute with appropriate alternative as covered by patient's insurance. Dx: E11.65, Disp: 100 each, Rfl: 3    Review of Systems   Constitutional:  Negative for activity change, appetite change, fatigue and unexpected weight change.   HENT:  Negative for dental problem, sore throat, trouble swallowing and voice change.    Eyes:  Negative for visual disturbance.   Respiratory:  Negative for cough, chest tightness and shortness of breath.    Cardiovascular:  Negative for chest pain, palpitations and leg swelling.  "  Gastrointestinal:  Negative for constipation, diarrhea, nausea and vomiting.   Endocrine: Negative for cold intolerance, heat intolerance, polydipsia, polyphagia and polyuria.   Genitourinary:  Negative for frequency.   Musculoskeletal:  Positive for arthralgias and gait problem. Negative for back pain and myalgias.   Skin:  Negative for wound.   Allergic/Immunologic: Positive for environmental allergies and food allergies.   Neurological:  Positive for weakness. Negative for dizziness, light-headedness, numbness and headaches.   Psychiatric/Behavioral:  Negative for decreased concentration, dysphoric mood and sleep disturbance. The patient is not nervous/anxious.        Physical Exam:  Body mass index is 46.06 kg/m².  /80   Pulse 66   Temp (!) 97.4 °F (36.3 °C) (Tympanic)   Ht 5' 6\" (1.676 m)   Wt 129 kg (285 lb 6.4 oz)   LMP 09/17/1999 (Exact Date)   SpO2 98%   BMI 46.06 kg/m²    Wt Readings from Last 3 Encounters:   05/03/24 129 kg (285 lb 6.4 oz)   02/27/24 127 kg (281 lb)   02/08/24 127 kg (281 lb)       Physical Exam  Vitals reviewed.   Constitutional:       General: She is not in acute distress.     Appearance: She is well-developed. She is obese. She is not ill-appearing.   HENT:      Head: Normocephalic and atraumatic.   Eyes:      Pupils: Pupils are equal, round, and reactive to light.   Neck:      Thyroid: No thyromegaly.   Cardiovascular:      Rate and Rhythm: Normal rate.      Pulses: Normal pulses.   Pulmonary:      Effort: Pulmonary effort is normal.   Musculoskeletal:      Cervical back: Normal range of motion and neck supple.      Right lower leg: No edema.      Left lower leg: No edema.   Lymphadenopathy:      Cervical: No cervical adenopathy.   Skin:     General: Skin is warm and dry.      Capillary Refill: Capillary refill takes less than 2 seconds.   Neurological:      Mental Status: She is alert and oriented to person, place, and time.      Gait: Gait normal.   Psychiatric:       "   Mood and Affect: Mood normal.         Behavior: Behavior normal.           Labs:   Lab Results   Component Value Date    HGBA1C 6.9 (H) 03/29/2024    HGBA1C 6.6 (H) 11/13/2023    HGBA1C 6.4 10/30/2023     Lab Results   Component Value Date    CREATININE 0.64 03/29/2024    CREATININE 0.66 11/13/2023    CREATININE 0.61 07/25/2023    BUN 17 03/29/2024    K 5.1 03/29/2024     03/29/2024    CO2 29 03/29/2024     eGFR   Date Value Ref Range Status   03/29/2024 100 > OR = 60 mL/min/1.73m2 Final   07/05/2021 92 ml/min/1.73sq m Final     Lab Results   Component Value Date    HDL 65 03/29/2024    TRIG 109 03/29/2024     Lab Results   Component Value Date    ALT 33 (H) 03/29/2024    AST 18 03/29/2024    ALKPHOS 85 03/29/2024     Lab Results   Component Value Date    [id number] 2.330 07/05/2021     Lab Results   Component Value Date    FREET4 1.7 09/09/2022       Discussed with the patient and all questioned fully answered. She will call me if any problems arise.    Follow-up appointment in 4 months.     Counseled patient on diagnostic results, prognosis, risk and benefit of treatment options, instruction for management, importance of treatment compliance, Risk  factor reduction and impressions    DOMINIQUE Dejesus

## 2024-05-03 NOTE — ASSESSMENT & PLAN NOTE
Could consider the addition of GLP-1.  I would recommend Ozempic or Mounjaro.  Lab Results   Component Value Date    HGBA1C 6.9 (H) 03/29/2024

## 2024-05-03 NOTE — ASSESSMENT & PLAN NOTE
Managed by PCP.  Patient is currently taking ezetimibe-simvastatin 10-40 mg nightly at bedtime.  LDL slightly above goal at 95.  Lab Results   Component Value Date    HGBA1C 6.9 (H) 03/29/2024

## 2024-05-03 NOTE — ASSESSMENT & PLAN NOTE
Received 12.5 mCi of radioactive iodine-131 in 2017 for treatment of hyperthyroidism.  Now maintained on 175 mcg of levothyroxine daily.  She is taking this consistently and correctly.  Thyroid function is stable.

## 2024-05-07 ENCOUNTER — OFFICE VISIT (OUTPATIENT)
Dept: FAMILY MEDICINE CLINIC | Facility: CLINIC | Age: 64
End: 2024-05-07
Payer: COMMERCIAL

## 2024-05-07 VITALS
TEMPERATURE: 99.7 F | OXYGEN SATURATION: 100 % | BODY MASS INDEX: 45.9 KG/M2 | SYSTOLIC BLOOD PRESSURE: 126 MMHG | DIASTOLIC BLOOD PRESSURE: 78 MMHG | WEIGHT: 285.6 LBS | HEIGHT: 66 IN | HEART RATE: 87 BPM

## 2024-05-07 DIAGNOSIS — R50.9 FEVER, UNSPECIFIED FEVER CAUSE: ICD-10-CM

## 2024-05-07 DIAGNOSIS — J02.9 SORE THROAT: ICD-10-CM

## 2024-05-07 DIAGNOSIS — J06.9 VIRAL URI WITH COUGH: Primary | ICD-10-CM

## 2024-05-07 LAB
S PYO DNA THROAT QL NAA+PROBE: NOT DETECTED
SARS-COV-2 AG UPPER RESP QL IA: NEGATIVE
VALID CONTROL: NORMAL

## 2024-05-07 PROCEDURE — 87070 CULTURE OTHR SPECIMN AEROBIC: CPT | Performed by: PHYSICIAN ASSISTANT

## 2024-05-07 PROCEDURE — 87651 STREP A DNA AMP PROBE: CPT | Performed by: PHYSICIAN ASSISTANT

## 2024-05-07 PROCEDURE — 99213 OFFICE O/P EST LOW 20 MIN: CPT | Performed by: PHYSICIAN ASSISTANT

## 2024-05-07 PROCEDURE — 87811 SARS-COV-2 COVID19 W/OPTIC: CPT | Performed by: PHYSICIAN ASSISTANT

## 2024-05-07 RX ORDER — DEXTROMETHORPHAN HYDROBROMIDE AND PROMETHAZINE HYDROCHLORIDE 15; 6.25 MG/5ML; MG/5ML
5 SYRUP ORAL 4 TIMES DAILY PRN
Qty: 120 ML | Refills: 0 | Status: SHIPPED | OUTPATIENT
Start: 2024-05-07

## 2024-05-07 NOTE — PROGRESS NOTES
Name: Tong Bolaños      : 1960      MRN: [id number]  Encounter Provider: Sonny Estrada PA-C  Encounter Date: 2024   Encounter department: [facility]    Assessment & Plan     1. Viral URI with cough  -     promethazine-dextromethorphan (PHENERGAN-DM) 6.25-15 mg/5 mL oral syrup; Take 5 mL by mouth 4 (four) times a day as needed for cough    2. Fever, unspecified fever cause  -     POCT Rapid Covid Ag    3. Sore throat  -     POCT rapid PCR strepA  -     Throat culture; Future  -     Throat culture    Rapid covid/strep is negaive. Exam is unremarkable. Suspect viral etiology. Suspected sx duration 5-7 days. Take tylenol, drink fluids, saline gargles, prn cough medication at night. Follow up prn if sx persist or worsen       Subjective     Pt presents with two days of sore throat, body aches, congestion, cough. Fever was 100.8. took tylenol about 10 hours ago. Now afebrile. Shares it hurts to swallow. Some nausea, no vomiting or abdominal pain. No known sick contacts.       Review of Systems   Constitutional:  Positive for chills, fatigue and fever.   HENT:  Positive for congestion and sore throat. Negative for ear pain, hearing loss, nosebleeds, postnasal drip, rhinorrhea, sinus pressure, sinus pain and sneezing.    Eyes:  Negative for pain, discharge, itching and visual disturbance.   Respiratory:  Positive for cough. Negative for chest tightness, shortness of breath and wheezing.    Cardiovascular:  Negative for chest pain, palpitations and leg swelling.   Gastrointestinal:  Negative for abdominal pain, blood in stool, constipation, diarrhea, nausea and vomiting.   Genitourinary:  Negative for frequency and urgency.   Musculoskeletal:  Positive for arthralgias and myalgias.   Neurological:  Positive for headaches. Negative for dizziness, light-headedness and numbness.       Past Medical History:   Diagnosis Date   • COVID-19 2023   • Diabetes mellitus (HCC)    • Disease  of thyroid gland    • Hypertension      Past Surgical History:   Procedure Laterality Date   • CORONARY ANGIOPLASTY WITH STENT PLACEMENT      LAD     Family History   Problem Relation Age of Onset   • Cervical cancer Mother         hysterectomy   • Breast cancer Maternal Aunt         masectomy   • Breast cancer Maternal Aunt         found lump in nipple   • No Known Problems Sister    • Diabetes Daughter    • No Known Problems Maternal Grandmother    • Prostate cancer Maternal Grandfather    • Heart disease Paternal Aunt    • Heart disease Paternal Aunt      Social History     Socioeconomic History   • Marital status: /Civil Union     Spouse name: None   • Number of children: None   • Years of education: None   • Highest education level: None   Occupational History   • None   Tobacco Use   • Smoking status: Never   • Smokeless tobacco: Never   Vaping Use   • Vaping status: Never Used   Substance and Sexual Activity   • Alcohol use: Never   • Drug use: Never   • Sexual activity: Not Currently     Partners: Male     Birth control/protection: Post-menopausal   Other Topics Concern   • None   Social History Narrative   • None     Social Determinants of Health     Financial Resource Strain: Not on file   Food Insecurity: Not on file   Transportation Needs: Not on file   Physical Activity: Not on file   Stress: Not on file   Social Connections: Not on file   Intimate Partner Violence: Not on file   Housing Stability: Not on file     Current Outpatient Medications on File Prior to Visit   Medication Sig   • albuterol (ProAir HFA) 90 mcg/act inhaler Inhale 2 puffs every 6 (six) hours as needed for wheezing   • Aspirin Low Dose 81 MG EC tablet TAKE 1 TABLET BY MOUTH EVERY DAY   • Blood Glucose Monitoring Suppl (OneTouch Verio Reflect) w/Device KIT Check blood sugars once daily. Please substitute with appropriate alternative as covered by patient's insurance. Dx: E11.65   • ezetimibe-simvastatin (VYTORIN) 10-40 mg per  "tablet Take 1 tablet by mouth daily at bedtime   • fluticasone (FLONASE) 50 mcg/act nasal spray 1 spray into each nostril daily   • glucose blood (OneTouch Verio) test strip Check blood sugars once daily. Please substitute with appropriate alternative as covered by patient's insurance. Dx: E11.65   • hydrochlorothiazide (HYDRODIURIL) 12.5 mg tablet TAKE 1 TABLET BY MOUTH EVERY DAY   • levothyroxine 175 mcg tablet TAKES 1 TABLET BY MOUTH DAILY AND ON SUNDAY 1.5 TABLETS. TOTAL OF 7.5 TABLETS WEEKLY.   • lisinopril (ZESTRIL) 20 mg tablet Take 1 tablet (20 mg total) by mouth daily   • metFORMIN (GLUCOPHAGE) 1000 MG tablet Take 1 tablet (1,000 mg total) by mouth 2 (two) times a day with meals   • OneTouch Delica Lancets 33G MISC Check blood sugars once daily. Please substitute with appropriate alternative as covered by patient's insurance. Dx: E11.65   • carvedilol (COREG) 12.5 mg tablet Take 1 tablet (12.5 mg total) by mouth 2 (two) times a day     Allergies   Allergen Reactions   • Kiwi Extract - Food Allergy Anaphylaxis   • Shrimp (Diagnostic) - Food Allergy Anaphylaxis   • Shellfish-Derived Products - Food Allergy Rash     All Seafood    • Latex Hives   • Nuts - Food Allergy Swelling     walnuts     Immunization History   Administered Date(s) Administered   • COVID-19 MODERNA VACC 0.5 ML IM 07/05/2021, 08/07/2021   • INFLUENZA 08/30/2022   • Influenza, injectable, quadrivalent, preservative free 0.5 mL 08/30/2022, 10/30/2023   • Influenza, recombinant, quadrivalent,injectable, preservative free 10/04/2021       Objective     /78   Pulse 87   Temp 99.7 °F (37.6 °C)   Ht 5' 6\" (1.676 m)   Wt 130 kg (285 lb 9.6 oz)   LMP 09/17/1999 (Exact Date)   SpO2 100%   BMI 46.10 kg/m²     Physical Exam  Vitals and nursing note reviewed.   Constitutional:       General: She is not in acute distress.     Appearance: Normal appearance.   HENT:      Head: Normocephalic and atraumatic.      Nose: Nose normal.      " Mouth/Throat:      Mouth: Mucous membranes are moist.      Pharynx: Oropharynx is clear. No oropharyngeal exudate or posterior oropharyngeal erythema.      Comments: Pnd noted  Eyes:      Pupils: Pupils are equal, round, and reactive to light.   Cardiovascular:      Rate and Rhythm: Normal rate and regular rhythm.      Heart sounds: Normal heart sounds.   Pulmonary:      Effort: Pulmonary effort is normal. No respiratory distress.      Breath sounds: Normal breath sounds. No wheezing, rhonchi or rales.   Musculoskeletal:         General: Normal range of motion.      Cervical back: Normal range of motion and neck supple.   Skin:     General: Skin is warm and dry.   Neurological:      Mental Status: She is alert and oriented to person, place, and time.   Psychiatric:         Mood and Affect: Mood and affect normal.       Sonny Estrada PA-C

## 2024-05-09 LAB — BACTERIA THROAT CULT: NORMAL

## 2024-07-09 LAB
25(OH)D3 SERPL-MCNC: 24 NG/ML (ref 30–100)
BUN SERPL-MCNC: 19 MG/DL (ref 7–25)
BUN/CREAT SERPL: ABNORMAL (CALC) (ref 6–22)
CALCIUM SERPL-MCNC: 9.2 MG/DL (ref 8.6–10.4)
CHLORIDE SERPL-SCNC: 107 MMOL/L (ref 98–110)
CO2 SERPL-SCNC: 27 MMOL/L (ref 20–32)
CREAT SERPL-MCNC: 0.71 MG/DL (ref 0.5–1.05)
GFR/BSA.PRED SERPLBLD CYS-BASED-ARV: 96 ML/MIN/1.73M2
GLUCOSE SERPL-MCNC: 121 MG/DL (ref 65–99)
HBA1C MFR BLD: 6.7 % OF TOTAL HGB
POTASSIUM SERPL-SCNC: 5.2 MMOL/L (ref 3.5–5.3)
SODIUM SERPL-SCNC: 141 MMOL/L (ref 135–146)

## 2024-07-30 ENCOUNTER — TELEPHONE (OUTPATIENT)
Age: 64
End: 2024-07-30

## 2024-07-30 DIAGNOSIS — E78.2 MIXED HYPERLIPIDEMIA: ICD-10-CM

## 2024-07-30 DIAGNOSIS — I25.10 CORONARY ARTERY DISEASE INVOLVING NATIVE CORONARY ARTERY OF NATIVE HEART WITHOUT ANGINA PECTORIS: ICD-10-CM

## 2024-07-30 DIAGNOSIS — E03.9 HYPOTHYROIDISM, UNSPECIFIED TYPE: ICD-10-CM

## 2024-07-30 DIAGNOSIS — I10 ESSENTIAL HYPERTENSION: ICD-10-CM

## 2024-07-30 DIAGNOSIS — E11.9 TYPE 2 DIABETES MELLITUS WITHOUT COMPLICATION, WITHOUT LONG-TERM CURRENT USE OF INSULIN (HCC): ICD-10-CM

## 2024-07-30 NOTE — TELEPHONE ENCOUNTER
Patient requests the following refills:    Carvedilol 12.5 mg;  Vytorin 10-40 mg;  Levothyroxine 175 mcg;  Lisinopril 20 mg; and   Metformin 1000 mg.    Please fill with [facility] #[id number].

## 2024-07-30 NOTE — TELEPHONE ENCOUNTER
Patient requesting a letter with all her medical diagnosis needed to purchase insurance.    Advised patient may need to sign YVON to obtain.  Patient expressed understanding.    Patient plans to stop by the office to .  Please contact patient when letter is available.

## 2024-07-31 RX ORDER — CARVEDILOL 12.5 MG/1
12.5 TABLET ORAL 2 TIMES DAILY
Qty: 60 TABLET | Refills: 5 | Status: SHIPPED | OUTPATIENT
Start: 2024-07-31 | End: 2024-09-29

## 2024-07-31 RX ORDER — EZETIMIBE AND SIMVASTATIN 10; 40 MG/1; MG/1
1 TABLET ORAL
Qty: 100 TABLET | Refills: 1 | Status: SHIPPED | OUTPATIENT
Start: 2024-07-31

## 2024-07-31 RX ORDER — LISINOPRIL 20 MG/1
20 TABLET ORAL DAILY
Qty: 100 TABLET | Refills: 1 | Status: SHIPPED | OUTPATIENT
Start: 2024-07-31

## 2024-07-31 RX ORDER — LEVOTHYROXINE SODIUM 175 UG/1
TABLET ORAL
Qty: 90 TABLET | Refills: 1 | Status: SHIPPED | OUTPATIENT
Start: 2024-07-31

## 2024-09-09 ENCOUNTER — TELEPHONE (OUTPATIENT)
Age: 64
End: 2024-09-09

## 2024-09-09 DIAGNOSIS — I10 ESSENTIAL HYPERTENSION: ICD-10-CM

## 2024-09-09 RX ORDER — CARVEDILOL 12.5 MG/1
12.5 TABLET ORAL 2 TIMES DAILY
Qty: 180 TABLET | Refills: 1 | Status: SHIPPED | OUTPATIENT
Start: 2024-09-09

## 2024-09-27 NOTE — PROGRESS NOTES
"Ambulatory Visit  Name: Tong Bolaños      : 1960      MRN: [id number]  Encounter Provider: Ally Nguyen DO  Encounter Date: 2024   Encounter department: [facility]    Assessment & Plan  Chronic pain of left knee  Worsening chronic knee pain. Given mechanism of injury, will update XR to evaluate for bony changes. May be worsening arthritis vs meniscal injury given \"stuck\" sensation. Trial short course of anti-inflammatory -- reviewed that this should not be taken long term given her HTN, but can take for 1 week to decrease inflammation. Encouraged to take with food and drink plenty of water. Will also refer back to Ortho to discuss another injection and/or course of PT.   Orders:    XR knee 4+ vw left injury; Future    diclofenac sodium (VOLTAREN) 50 mg EC tablet; Take 1 tablet (50 mg total) by mouth 2 (two) times a day for 7 days    Ambulatory referral to Orthopedic Surgery; Future    Type 2 diabetes mellitus without complication, without long-term current use of insulin (HCC)    Lab Results   Component Value Date    HGBA1C 6.7 (H) 2024       Orders:    CBC and differential; Future    Comprehensive metabolic panel; Future    Hemoglobin A1C; Future    Albumin / creatinine urine ratio; Future    Lipid panel; Future    Hyperlipidemia associated with type 2 diabetes mellitus  (HCC)    Lab Results   Component Value Date    HGBA1C 6.7 (H) 2024       Orders:    CBC and differential; Future    Comprehensive metabolic panel; Future    Lipid panel; Future    Hypothyroidism following radioiodine therapy    Orders:    TSH, 3rd generation with Free T4 reflex; Future    Vitamin D deficiency    Orders:    Vitamin D 25 hydroxy; Future    Vitamin B12 deficiency    Orders:    Vitamin B12; Future       Update labs and schedule chronic follow up with PCP       History of Present Illness     HPI    Pt presents due to knee pain     L knee pain -- had a fall on Mother's Day.  Since " "then, her knee isn't bending well, and she has been having pain   Worse with standing/cooking   Previously saw Ortho -- has injections in 02/2024. Also completed course of PT for about 6 weeks in 03 and 04/2024. Symptoms started after this.   Previous XR showed moderate OA      Review of Systems   Musculoskeletal:  Positive for arthralgias.     Medical History Reviewed by provider this encounter:           Objective     /82   Pulse 68   Temp 98.5 °F (36.9 °C)   Ht 5' 6\" (1.676 m)   Wt 126 kg (277 lb 12.8 oz)   LMP 09/17/1999 (Exact Date)   SpO2 99%   BMI 44.84 kg/m²     Physical Exam  Vitals and nursing note reviewed.   Constitutional:       General: She is not in acute distress.     Appearance: Normal appearance.   Cardiovascular:      Pulses: no weak pulses.           Dorsalis pedis pulses are 0 (Due to swelling) on the right side and 0 (Due to swelling) on the left side.   Pulmonary:      Effort: Pulmonary effort is normal. No respiratory distress.   Musculoskeletal:      Comments: Knee exam somewhat limited  Tender to palpation just distal to lateral joint line   Mild edema over medial joint line compared to R  No erythema/warmth  F/E limited due to discomfort -- pt notes it feels \"stuck\"   Feet:      Right foot:      Skin integrity: Dry skin present. No callus.      Left foot:      Skin integrity: Dry skin present. No callus.   Neurological:      Mental Status: She is alert.      Comments: Grossly intact   Psychiatric:         Mood and Affect: Mood normal.         Patient's shoes and socks removed.    Right Foot/Ankle   Right Foot Inspection  Skin Exam: dry skin. No callus and no callus.     Toe Exam: ROM and strength within normal limits and swelling.  no right toe deformity    Sensory   Vibration: intact  Monofilament testing: intact    Vascular  Capillary refills: < 3 seconds  The right DP pulse is 0 (Due to swelling).     Left Foot/Ankle  Left Foot Inspection  Skin Exam: dry skin. No callus. "     Toe Exam: ROM and strength within normal limits and swelling. No left toe deformity.     Sensory   Vibration: intact  Monofilament testing: intact    Vascular  Capillary refills: < 3 seconds  The left DP pulse is 0 (Due to swelling).     Assign Risk Category  No deformity present  No loss of protective sensation  No weak pulses  Risk: 0

## 2024-09-30 ENCOUNTER — OFFICE VISIT (OUTPATIENT)
Dept: FAMILY MEDICINE CLINIC | Facility: CLINIC | Age: 64
End: 2024-09-30
Payer: COMMERCIAL

## 2024-09-30 ENCOUNTER — APPOINTMENT (OUTPATIENT)
Dept: RADIOLOGY | Facility: CLINIC | Age: 64
End: 2024-09-30
Payer: COMMERCIAL

## 2024-09-30 VITALS
DIASTOLIC BLOOD PRESSURE: 82 MMHG | TEMPERATURE: 98.5 F | BODY MASS INDEX: 44.65 KG/M2 | WEIGHT: 277.8 LBS | SYSTOLIC BLOOD PRESSURE: 128 MMHG | HEIGHT: 66 IN | HEART RATE: 68 BPM | OXYGEN SATURATION: 99 %

## 2024-09-30 DIAGNOSIS — G89.29 CHRONIC PAIN OF LEFT KNEE: ICD-10-CM

## 2024-09-30 DIAGNOSIS — E53.8 VITAMIN B12 DEFICIENCY: ICD-10-CM

## 2024-09-30 DIAGNOSIS — M25.562 CHRONIC PAIN OF LEFT KNEE: ICD-10-CM

## 2024-09-30 DIAGNOSIS — E78.5 HYPERLIPIDEMIA ASSOCIATED WITH TYPE 2 DIABETES MELLITUS  (HCC): ICD-10-CM

## 2024-09-30 DIAGNOSIS — E11.9 TYPE 2 DIABETES MELLITUS WITHOUT COMPLICATION, WITHOUT LONG-TERM CURRENT USE OF INSULIN (HCC): ICD-10-CM

## 2024-09-30 DIAGNOSIS — G89.29 CHRONIC PAIN OF LEFT KNEE: Primary | ICD-10-CM

## 2024-09-30 DIAGNOSIS — E11.69 HYPERLIPIDEMIA ASSOCIATED WITH TYPE 2 DIABETES MELLITUS  (HCC): ICD-10-CM

## 2024-09-30 DIAGNOSIS — E89.0 HYPOTHYROIDISM FOLLOWING RADIOIODINE THERAPY: ICD-10-CM

## 2024-09-30 DIAGNOSIS — M25.562 CHRONIC PAIN OF LEFT KNEE: Primary | ICD-10-CM

## 2024-09-30 DIAGNOSIS — E55.9 VITAMIN D DEFICIENCY: ICD-10-CM

## 2024-09-30 PROCEDURE — 73564 X-RAY EXAM KNEE 4 OR MORE: CPT

## 2024-09-30 PROCEDURE — 99214 OFFICE O/P EST MOD 30 MIN: CPT | Performed by: FAMILY MEDICINE

## 2024-09-30 NOTE — ASSESSMENT & PLAN NOTE
Lab Results   Component Value Date    HGBA1C 6.7 (H) 07/08/2024       Orders:    CBC and differential; Future    Comprehensive metabolic panel; Future    Hemoglobin A1C; Future    Albumin / creatinine urine ratio; Future    Lipid panel; Future

## 2024-09-30 NOTE — ASSESSMENT & PLAN NOTE
Lab Results   Component Value Date    HGBA1C 6.7 (H) 07/08/2024       Orders:    CBC and differential; Future    Comprehensive metabolic panel; Future    Lipid panel; Future

## 2024-09-30 NOTE — PROGRESS NOTES
Ambulatory Visit  Name: Tong Bolaños      : 1960      MRN: [id number]  Encounter Provider: DOMINIQUE Dejesus  Encounter Date: 10/2/2024   Encounter department: [facility] [place]    Assessment & Plan  Type 2 diabetes mellitus without complication, without long-term current use of insulin (HCC)  While A1C is relatively well controlled, goal is 6.5% or less. Patient is having occasional postprandial hyperglycemia despite following a healthy, relatively low carbohydrate diet. Continue metformin 1,000 mg twice daily. Start Januvia 50 mg daily. Will apply for CGM through DME to better assist both patient and myself with understanding why she experiences episodes of hypoglycemia (BG 50 mg/dL) despite not taking any medications that increase the risk of this. Continue with diet and physical activity as tolerated. Patient knows to notify me with persistent hyperglycemia or episodes of hypoglycemia.  F/U in 6 months.   Lab Results   Component Value Date    HGBA1C 6.8 (H) 10/01/2024       Orders:    sitaGLIPtin (JANUVIA) 50 mg tablet; Take 1 tablet (50 mg total) by mouth daily    Albumin / creatinine urine ratio; Future    Basic metabolic panel; Future    Hemoglobin A1C; Future    Hypothyroidism following radioiodine therapy  Thyroid function is stable.  Continue 175 mcg of levothyroxine daily.  Repeat thyroid function test in 6 months prior to follow-up.    Orders:    levothyroxine 175 mcg tablet; Take 1 tablet daily.    TSH, 3rd generation; Future    T4, free; Future    Hyperlipidemia associated with type 2 diabetes mellitus  (HCC)  Well-controlled.  Continue ezetimibe-simvastatin 10-40 mg nightly.  Lab Results   Component Value Date    HGBA1C 6.8 (H) 10/01/2024            Hypertension complicating diabetes (HCC)  BP well-controlled at 118/62.  Continue carvedilol 12.5 mg twice daily and lisinopril 20 mg daily and hydrochlorothiazide 12.5 mg daily.  Lab Results    Component Value Date    HGBA1C 6.8 (H) 10/01/2024            Venous (peripheral) insufficiency    Orders:    Compression Stocking    Class 3 severe obesity due to excess calories with serious comorbidity and body mass index (BMI) of 45.0 to 49.9 in adult ([facility])  Counseled on relationship between obesity and insulin resistance. Unfortunately, patient's ability to exercise is limited by knee pain. She is making an effort to reduce calorie intake. She has lost approximately 8 lbs since May.            History of Present Illness     Tong Bolaños is a 64 y.o. female with type 2 diabetes and post ablative hypothyroidism presenting to the office today for follow up.    Patient reports feeling well however, she does continues to have episodes of unexplained hypoglycemia. She is symptomatic of this once blood sugars are in the low 60s. She required her daughter's assistance to get to the refrigerator a few weeks ago when blood sugar was at 50 mg/dL. No LOC. No SZ history.     Current regimen:    Metformin 1000 mg twice daily        For postablative hypothyroidism, she is taking 175 mcg daily. Thyroid function is stable.      TSH 3RD GENERATON   Latest Ref Rng 0.450 - 4.500 uIU/mL   7/5/2021 2.330    10/1/2024 4.132              Review of Systems   Constitutional:  Negative for activity change, appetite change, fatigue and unexpected weight change.   HENT:  Negative for dental problem, sore throat, trouble swallowing and voice change.    Eyes:  Negative for visual disturbance.   Respiratory:  Negative for cough, chest tightness and shortness of breath.    Cardiovascular:  Negative for chest pain, palpitations and leg swelling.   Gastrointestinal:  Negative for constipation, diarrhea, nausea and vomiting.   Endocrine: Negative for cold intolerance, heat intolerance, polydipsia, polyphagia and polyuria.   Genitourinary:  Negative for frequency.   Musculoskeletal:  Positive for arthralgias and gait problem. Negative for back  "pain and myalgias.   Skin:  Negative for wound.   Allergic/Immunologic: Positive for environmental allergies and food allergies.   Neurological:  Positive for weakness and numbness. Negative for dizziness, light-headedness and headaches.   Psychiatric/Behavioral:  Negative for decreased concentration, dysphoric mood and sleep disturbance. The patient is not nervous/anxious.            Objective     /62 (BP Location: Right arm, Patient Position: Sitting, Cuff Size: Standard)   Pulse 62   Temp 98.1 °F (36.7 °C) (Temporal)   Resp 18   Ht 5' 6\" (1.676 m) Comment: verbal  Wt 128 kg (282 lb)   LMP 09/17/1999 (Exact Date)   SpO2 97%   BMI 45.52 kg/m²     Physical Exam  Vitals reviewed.   Constitutional:       General: She is not in acute distress.     Appearance: She is well-developed. She is obese.   HENT:      Head: Normocephalic and atraumatic.   Eyes:      Conjunctiva/sclera: Conjunctivae normal.   Cardiovascular:      Rate and Rhythm: Normal rate and regular rhythm.      Pulses: Normal pulses.      Heart sounds: Normal heart sounds.   Pulmonary:      Effort: Pulmonary effort is normal. No respiratory distress.      Breath sounds: Normal breath sounds.   Abdominal:      Palpations: Abdomen is soft.      Tenderness: There is no abdominal tenderness.   Musculoskeletal:         General: No swelling.      Cervical back: Neck supple.      Right lower leg: Edema present.      Left lower leg: Edema present.   Skin:     General: Skin is warm and dry.      Capillary Refill: Capillary refill takes less than 2 seconds.   Neurological:      Mental Status: She is alert.   Psychiatric:         Mood and Affect: Mood normal.         "

## 2024-10-01 ENCOUNTER — TELEPHONE (OUTPATIENT)
Dept: FAMILY MEDICINE CLINIC | Facility: CLINIC | Age: 64
End: 2024-10-01

## 2024-10-01 ENCOUNTER — TELEPHONE (OUTPATIENT)
Dept: ADMINISTRATIVE | Facility: OTHER | Age: 64
End: 2024-10-01

## 2024-10-01 ENCOUNTER — APPOINTMENT (OUTPATIENT)
Dept: LAB | Facility: CLINIC | Age: 64
End: 2024-10-01
Payer: COMMERCIAL

## 2024-10-01 DIAGNOSIS — E89.0 HYPOTHYROIDISM FOLLOWING RADIOIODINE THERAPY: ICD-10-CM

## 2024-10-01 DIAGNOSIS — E11.9 TYPE 2 DIABETES MELLITUS WITHOUT COMPLICATION, WITHOUT LONG-TERM CURRENT USE OF INSULIN (HCC): ICD-10-CM

## 2024-10-01 DIAGNOSIS — E78.5 HYPERLIPIDEMIA ASSOCIATED WITH TYPE 2 DIABETES MELLITUS  (HCC): ICD-10-CM

## 2024-10-01 DIAGNOSIS — E53.8 VITAMIN B12 DEFICIENCY: ICD-10-CM

## 2024-10-01 DIAGNOSIS — E11.69 HYPERLIPIDEMIA ASSOCIATED WITH TYPE 2 DIABETES MELLITUS  (HCC): ICD-10-CM

## 2024-10-01 DIAGNOSIS — E55.9 VITAMIN D DEFICIENCY: ICD-10-CM

## 2024-10-01 LAB
25(OH)D3 SERPL-MCNC: 34.6 NG/ML (ref 30–100)
ALBUMIN SERPL BCG-MCNC: 4.1 G/DL (ref 3.5–5)
ALP SERPL-CCNC: 70 U/L (ref 34–104)
ALT SERPL W P-5'-P-CCNC: 17 U/L (ref 7–52)
ANION GAP SERPL CALCULATED.3IONS-SCNC: 9 MMOL/L (ref 4–13)
AST SERPL W P-5'-P-CCNC: 16 U/L (ref 13–39)
BASOPHILS # BLD AUTO: 0.05 THOUSANDS/ÂΜL (ref 0–0.1)
BASOPHILS NFR BLD AUTO: 1 % (ref 0–1)
BILIRUB SERPL-MCNC: 0.4 MG/DL (ref 0.2–1)
BUN SERPL-MCNC: 13 MG/DL (ref 5–25)
CALCIUM SERPL-MCNC: 9.3 MG/DL (ref 8.4–10.2)
CHLORIDE SERPL-SCNC: 106 MMOL/L (ref 96–108)
CHOLEST SERPL-MCNC: 145 MG/DL
CO2 SERPL-SCNC: 28 MMOL/L (ref 21–32)
CREAT SERPL-MCNC: 0.66 MG/DL (ref 0.6–1.3)
EOSINOPHIL # BLD AUTO: 0.08 THOUSAND/ÂΜL (ref 0–0.61)
EOSINOPHIL NFR BLD AUTO: 1 % (ref 0–6)
ERYTHROCYTE [DISTWIDTH] IN BLOOD BY AUTOMATED COUNT: 15.6 % (ref 11.6–15.1)
EST. AVERAGE GLUCOSE BLD GHB EST-MCNC: 148 MG/DL
GFR SERPL CREATININE-BSD FRML MDRD: 94 ML/MIN/1.73SQ M
GLUCOSE P FAST SERPL-MCNC: 110 MG/DL (ref 65–99)
HBA1C MFR BLD: 6.8 %
HCT VFR BLD AUTO: 38 % (ref 34.8–46.1)
HDLC SERPL-MCNC: 51 MG/DL
HGB BLD-MCNC: 11.7 G/DL (ref 11.5–15.4)
IMM GRANULOCYTES # BLD AUTO: 0.01 THOUSAND/UL (ref 0–0.2)
IMM GRANULOCYTES NFR BLD AUTO: 0 % (ref 0–2)
LDLC SERPL CALC-MCNC: 75 MG/DL (ref 0–100)
LYMPHOCYTES # BLD AUTO: 1.8 THOUSANDS/ÂΜL (ref 0.6–4.47)
LYMPHOCYTES NFR BLD AUTO: 30 % (ref 14–44)
MCH RBC QN AUTO: 28.5 PG (ref 26.8–34.3)
MCHC RBC AUTO-ENTMCNC: 30.8 G/DL (ref 31.4–37.4)
MCV RBC AUTO: 93 FL (ref 82–98)
MONOCYTES # BLD AUTO: 0.61 THOUSAND/ÂΜL (ref 0.17–1.22)
MONOCYTES NFR BLD AUTO: 10 % (ref 4–12)
NEUTROPHILS # BLD AUTO: 3.42 THOUSANDS/ÂΜL (ref 1.85–7.62)
NEUTS SEG NFR BLD AUTO: 58 % (ref 43–75)
NONHDLC SERPL-MCNC: 94 MG/DL
NRBC BLD AUTO-RTO: 0 /100 WBCS
PLATELET # BLD AUTO: 149 THOUSANDS/UL (ref 149–390)
PMV BLD AUTO: 12.9 FL (ref 8.9–12.7)
POTASSIUM SERPL-SCNC: 4.3 MMOL/L (ref 3.5–5.3)
PROT SERPL-MCNC: 6.8 G/DL (ref 6.4–8.4)
RBC # BLD AUTO: 4.11 MILLION/UL (ref 3.81–5.12)
SODIUM SERPL-SCNC: 143 MMOL/L (ref 135–147)
TRIGL SERPL-MCNC: 96 MG/DL
TSH SERPL DL<=0.05 MIU/L-ACNC: 4.13 UIU/ML (ref 0.45–4.5)
VIT B12 SERPL-MCNC: 237 PG/ML (ref 180–914)
WBC # BLD AUTO: 5.97 THOUSAND/UL (ref 4.31–10.16)

## 2024-10-01 PROCEDURE — 80053 COMPREHEN METABOLIC PANEL: CPT

## 2024-10-01 PROCEDURE — 82607 VITAMIN B-12: CPT

## 2024-10-01 PROCEDURE — 83036 HEMOGLOBIN GLYCOSYLATED A1C: CPT

## 2024-10-01 PROCEDURE — 84443 ASSAY THYROID STIM HORMONE: CPT

## 2024-10-01 PROCEDURE — 85025 COMPLETE CBC W/AUTO DIFF WBC: CPT

## 2024-10-01 PROCEDURE — 80061 LIPID PANEL: CPT

## 2024-10-01 NOTE — LETTER
Diabetic Eye Exam Form    Date Requested: 10/01/24  Patient: Tong Bolaños  Patient : 1960   Referring Provider: Sonny Estrada PA-C      DIABETIC Eye Exam Date _______________________________      Type of Exam MUST be documented for Diabetic Eye Exams. Please CHECK ONE.     Retinal Exam       Dilated Retinal Exam       OCT       Optomap-Iris Exam      Fundus Photography       Left Eye - Please check Retinopathy or No Retinopathy        Exam did show retinopathy    Exam did not show retinopathy       Right Eye - Please check Retinopathy or No Retinopathy       Exam did show retinopathy    Exam did not show retinopathy       Comments __________________________________________________________    Practice Providing Exam ______________________________________________    Exam Performed By (print name) _______________________________________      Provider Signature ___________________________________________________      These reports are needed for  compliance.  Please fax this completed form and a copy of the Diabetic Eye Exam report to our office located at [address] [place] [address] as soon as possible via Fax [phone] attention [place]: Phone [phone]  We thank you for your assistance in treating our mutual patient.   [facility] - [phone] F [phone]

## 2024-10-01 NOTE — TELEPHONE ENCOUNTER
----- Message from Keshia LOREDO sent at 9/30/2024  4:14 PM EDT -----  Regarding: Care Gap Request Dibetic Eye Exam  09/30/24 4:15 PM    Hello, our patient Tong Bolaños has had Diabetic Eye Exam completed/performed. Please assist in updating the patient chart by making an External outreach to [facility] facility located in [facility]. The date of service is 2024.    Thank you,  Keshia PARRA

## 2024-10-01 NOTE — TELEPHONE ENCOUNTER
Upon review of the In Basket request and the patient's chart, initial outreach has been made via fax to facility. Please see Contacts section for details.     Thank you  Toño Hooker MA

## 2024-10-01 NOTE — TELEPHONE ENCOUNTER
----- Message from Ally Nguyen, DO sent at 10/1/2024  4:07 PM EDT -----  Please let pt know her knee xray does not show any fracture, but there is arthritic and fluid. Would recommend she schedule f/u with Ortho as we discussed at her visit

## 2024-10-02 ENCOUNTER — OFFICE VISIT (OUTPATIENT)
Dept: ENDOCRINOLOGY | Facility: CLINIC | Age: 64
End: 2024-10-02
Payer: COMMERCIAL

## 2024-10-02 ENCOUNTER — TELEPHONE (OUTPATIENT)
Dept: FAMILY MEDICINE CLINIC | Facility: CLINIC | Age: 64
End: 2024-10-02

## 2024-10-02 VITALS
HEIGHT: 66 IN | BODY MASS INDEX: 45.32 KG/M2 | DIASTOLIC BLOOD PRESSURE: 62 MMHG | RESPIRATION RATE: 18 BRPM | OXYGEN SATURATION: 97 % | WEIGHT: 282 LBS | HEART RATE: 62 BPM | TEMPERATURE: 98.1 F | SYSTOLIC BLOOD PRESSURE: 118 MMHG

## 2024-10-02 DIAGNOSIS — E78.5 HYPERLIPIDEMIA ASSOCIATED WITH TYPE 2 DIABETES MELLITUS  (HCC): ICD-10-CM

## 2024-10-02 DIAGNOSIS — E11.69 HYPERLIPIDEMIA ASSOCIATED WITH TYPE 2 DIABETES MELLITUS  (HCC): ICD-10-CM

## 2024-10-02 DIAGNOSIS — E66.813 CLASS 3 SEVERE OBESITY DUE TO EXCESS CALORIES WITH SERIOUS COMORBIDITY AND BODY MASS INDEX (BMI) OF 45.0 TO 49.9 IN ADULT (HCC): ICD-10-CM

## 2024-10-02 DIAGNOSIS — I87.2 VENOUS (PERIPHERAL) INSUFFICIENCY: ICD-10-CM

## 2024-10-02 DIAGNOSIS — R79.89 ABNORMAL CBC: Primary | ICD-10-CM

## 2024-10-02 DIAGNOSIS — E89.0 HYPOTHYROIDISM FOLLOWING RADIOIODINE THERAPY: ICD-10-CM

## 2024-10-02 DIAGNOSIS — E66.01 CLASS 3 SEVERE OBESITY DUE TO EXCESS CALORIES WITH SERIOUS COMORBIDITY AND BODY MASS INDEX (BMI) OF 45.0 TO 49.9 IN ADULT (HCC): ICD-10-CM

## 2024-10-02 DIAGNOSIS — E11.9 HYPERTENSION COMPLICATING DIABETES (HCC): ICD-10-CM

## 2024-10-02 DIAGNOSIS — E11.9 TYPE 2 DIABETES MELLITUS WITHOUT COMPLICATION, WITHOUT LONG-TERM CURRENT USE OF INSULIN (HCC): Primary | ICD-10-CM

## 2024-10-02 DIAGNOSIS — I10 HYPERTENSION COMPLICATING DIABETES (HCC): ICD-10-CM

## 2024-10-02 LAB
DME PARACHUTE DELIVERY DATE REQUESTED: NORMAL
DME PARACHUTE ITEM DESCRIPTION: NORMAL
DME PARACHUTE ITEM DESCRIPTION: NORMAL
DME PARACHUTE ORDER STATUS: NORMAL
DME PARACHUTE SUPPLIER NAME: NORMAL
DME PARACHUTE SUPPLIER PHONE: NORMAL
FERRITIN SERPL-MCNC: 62 NG/ML (ref 11–307)
IRON SATN MFR SERPL: 18 % (ref 15–50)
IRON SERPL-MCNC: 55 UG/DL (ref 50–212)
TIBC SERPL-MCNC: 314 UG/DL (ref 250–450)
UIBC SERPL-MCNC: 259 UG/DL (ref 155–355)

## 2024-10-02 PROCEDURE — 99214 OFFICE O/P EST MOD 30 MIN: CPT | Performed by: NURSE PRACTITIONER

## 2024-10-02 RX ORDER — LEVOTHYROXINE SODIUM 175 UG/1
TABLET ORAL
Qty: 90 TABLET | Refills: 1 | Status: SHIPPED | OUTPATIENT
Start: 2024-10-02 | End: 2024-10-09

## 2024-10-02 NOTE — ASSESSMENT & PLAN NOTE
Counseled on relationship between obesity and insulin resistance. Unfortunately, patient's ability to exercise is limited by knee pain. She is making an effort to reduce calorie intake. She has lost approximately 8 lbs since May.

## 2024-10-02 NOTE — ASSESSMENT & PLAN NOTE
Thyroid function is stable.  Continue 175 mcg of levothyroxine daily.  Repeat thyroid function test in 6 months prior to follow-up.    Orders:    levothyroxine 175 mcg tablet; Take 1 tablet daily.    TSH, 3rd generation; Future    T4, free; Future

## 2024-10-02 NOTE — ASSESSMENT & PLAN NOTE
Well-controlled.  Continue ezetimibe-simvastatin 10-40 mg nightly.  Lab Results   Component Value Date    HGBA1C 6.8 (H) 10/01/2024

## 2024-10-02 NOTE — ASSESSMENT & PLAN NOTE
While A1C is relatively well controlled, goal is 6.5% or less. Patient is having occasional postprandial hyperglycemia despite following a healthy, relatively low carbohydrate diet. Continue metformin 1,000 mg twice daily. Start Januvia 50 mg daily. Will apply for CGM through DME to better assist both patient and myself with understanding why she experiences episodes of hypoglycemia (BG 50 mg/dL) despite not taking any medications that increase the risk of this. Continue with diet and physical activity as tolerated. Patient knows to notify me with persistent hyperglycemia or episodes of hypoglycemia.  F/U in 6 months.   Lab Results   Component Value Date    HGBA1C 6.8 (H) 10/01/2024       Orders:    sitaGLIPtin (JANUVIA) 50 mg tablet; Take 1 tablet (50 mg total) by mouth daily    Albumin / creatinine urine ratio; Future    Basic metabolic panel; Future    Hemoglobin A1C; Future

## 2024-10-02 NOTE — ASSESSMENT & PLAN NOTE
BP well-controlled at 118/62.  Continue carvedilol 12.5 mg twice daily and lisinopril 20 mg daily and hydrochlorothiazide 12.5 mg daily.  Lab Results   Component Value Date    HGBA1C 6.8 (H) 10/01/2024

## 2024-10-02 NOTE — TELEPHONE ENCOUNTER
10/2- Left voicemail for patient to return call.     I called the lab and they are able to add the iron panel.     ----- Message from Ally Nguyen DO sent at 10/2/2024  7:53 AM EDT -----  Please let pt know:   Kidney and liver function normal   A1c stable at 6.8%  Not anemic, but red blood cell markers low -- can we please see if the lab can add on iron panel?   Cholesterol in normal range  B12 on low end of normal -- would recommend supplement of 500-1000 mcg daily  Thyroid function normal

## 2024-10-04 NOTE — TELEPHONE ENCOUNTER
Upon review of the In Basket request we have found as a result of outreach that patient did not have the requested item(s) completed.     Any additional questions or concerns should be emailed to the Practice Liaisons via the appropriate education email address, please do not reply via In Basket.    Thank you  Toño Hooker MA   [facility]

## 2024-10-08 DIAGNOSIS — E89.0 HYPOTHYROIDISM FOLLOWING RADIOIODINE THERAPY: ICD-10-CM

## 2024-10-09 ENCOUNTER — OFFICE VISIT (OUTPATIENT)
Dept: OBGYN CLINIC | Facility: CLINIC | Age: 64
End: 2024-10-09
Payer: COMMERCIAL

## 2024-10-09 ENCOUNTER — IMMUNIZATIONS (OUTPATIENT)
Dept: FAMILY MEDICINE CLINIC | Facility: CLINIC | Age: 64
End: 2024-10-09
Payer: COMMERCIAL

## 2024-10-09 VITALS
WEIGHT: 282 LBS | DIASTOLIC BLOOD PRESSURE: 81 MMHG | SYSTOLIC BLOOD PRESSURE: 139 MMHG | HEART RATE: 76 BPM | HEIGHT: 66 IN | BODY MASS INDEX: 45.32 KG/M2

## 2024-10-09 DIAGNOSIS — M25.562 CHRONIC PAIN OF LEFT KNEE: ICD-10-CM

## 2024-10-09 DIAGNOSIS — G89.29 CHRONIC PAIN OF LEFT KNEE: ICD-10-CM

## 2024-10-09 DIAGNOSIS — Z23 ENCOUNTER FOR IMMUNIZATION: Primary | ICD-10-CM

## 2024-10-09 DIAGNOSIS — M17.12 PRIMARY OSTEOARTHRITIS OF LEFT KNEE: Primary | ICD-10-CM

## 2024-10-09 PROCEDURE — 90472 IMMUNIZATION ADMIN EACH ADD: CPT

## 2024-10-09 PROCEDURE — 99214 OFFICE O/P EST MOD 30 MIN: CPT | Performed by: ORTHOPAEDIC SURGERY

## 2024-10-09 PROCEDURE — 90673 RIV3 VACCINE NO PRESERV IM: CPT

## 2024-10-09 PROCEDURE — 20610 DRAIN/INJ JOINT/BURSA W/O US: CPT | Performed by: ORTHOPAEDIC SURGERY

## 2024-10-09 PROCEDURE — 90471 IMMUNIZATION ADMIN: CPT

## 2024-10-09 RX ORDER — BUPIVACAINE HYDROCHLORIDE 2.5 MG/ML
2 INJECTION, SOLUTION INFILTRATION; PERINEURAL
Status: COMPLETED | OUTPATIENT
Start: 2024-10-09 | End: 2024-10-09

## 2024-10-09 RX ORDER — LIDOCAINE HYDROCHLORIDE 10 MG/ML
2 INJECTION, SOLUTION INFILTRATION; PERINEURAL
Status: COMPLETED | OUTPATIENT
Start: 2024-10-09 | End: 2024-10-09

## 2024-10-09 RX ORDER — LEVOTHYROXINE SODIUM 175 UG/1
TABLET ORAL
Qty: 90 TABLET | Refills: 1 | Status: SHIPPED | OUTPATIENT
Start: 2024-10-09

## 2024-10-09 RX ORDER — METHYLPREDNISOLONE ACETATE 40 MG/ML
2 INJECTION, SUSPENSION INTRA-ARTICULAR; INTRALESIONAL; INTRAMUSCULAR; SOFT TISSUE
Status: COMPLETED | OUTPATIENT
Start: 2024-10-09 | End: 2024-10-09

## 2024-10-09 RX ADMIN — LIDOCAINE HYDROCHLORIDE 2 ML: 10 INJECTION, SOLUTION INFILTRATION; PERINEURAL at 09:45

## 2024-10-09 RX ADMIN — BUPIVACAINE HYDROCHLORIDE 2 ML: 2.5 INJECTION, SOLUTION INFILTRATION; PERINEURAL at 09:45

## 2024-10-09 RX ADMIN — METHYLPREDNISOLONE ACETATE 2 ML: 40 INJECTION, SUSPENSION INTRA-ARTICULAR; INTRALESIONAL; INTRAMUSCULAR; SOFT TISSUE at 09:45

## 2024-10-09 NOTE — PROGRESS NOTES
Patient Name:  Tong Bolaños  MRN:  [id number]    Assessment & Plan     1. Primary osteoarthritis of left knee  -     Large joint arthrocentesis: L knee  -     Ambulatory referral to Physical Therapy; Future  -     Injection procedure prior authorization; Future  2. Chronic pain of left knee  -     Ambulatory referral to Orthopedic Surgery  -     Large joint arthrocentesis: L knee  -     Ambulatory referral to Physical Therapy; Future      Left knee osteoarthritis with worsening pain  X-rays reviewed and discussed with the patient  Nonoperative treatment was discussed in the form of oral analgesics, activity modification, injection therapy, and formal physical therapy.  Surgical intervention was briefly discussed in the form of total knee arthroplasty, including BMI requirements for surgery.  Patient is not interested in surgical intervention at this time.  The patient was offered a corticosteroid injection.  Risks and benefits were discussed and she elected to proceed forward.  She tolerated the procedure well.  Patient was also interested in pursuing hyaluronic acid injections as corticosteroid did not last as long as she would have hoped last time.  Order was placed for Durolane authorization.  We will see the patient back as needed if symptoms persist or return once Durolane is authorized for injection therapy, ideally waiting at least 6 weeks from her most recent injection.    History of the Present Illness   Tong Bolaños is a 64 y.o. female with left knee pain.  Patient received a corticosteroid injection in her left knee on 2/27/2024.  She noted significant improvement in pain at that time but sustained a fall on Mother's Day which worsened her pain.  She received x-rays which were negative for fracture.  She notes difficulty with range of motion, swelling, and pain throughout her left knee.  She is interested to repeat injection therapy.  Pain is a 7 out of 10 or greater.        Review of Systems  "    Review of Systems    Physical Exam     /81   Pulse 76   Ht 5' 6\" (1.676 m)   Wt 128 kg (282 lb)   LMP 09/17/1999 (Exact Date)   BMI 45.52 kg/m²     Left Knee  Range of motion from 10 to 70.    There is a small effusion.    There is tenderness over the medial lateral joint lines.    There is 5/5 quadriceps strength and normal tone.    The patient is able to perform a straight leg raise.      Varus stress testing reveals no instability at 0 and 30 degrees   Valgus stress testing reveals no instability at 0 and 30 degrees  The patient is neurovascular intact distally.     Data Review     I have personally reviewed pertinent films in PACS, and my interpretation follows.    Nonweightbearing x-rays of left knee from 9/30/2024 independently reviewed and display no fracture or dislocation.  Moderate tricompartmental degenerative changes noted.    X-rays taken 02/08/2024 of Left knee demonstrate moderate tricompartmental degenerative changes with associated osteophytes. No fracture or dislocation.       Social History     Tobacco Use    Smoking status: Never     Passive exposure: Never    Smokeless tobacco: Never   Vaping Use    Vaping status: Never Used   Substance Use Topics    Alcohol use: Never    Drug use: Never           Large joint arthrocentesis: L knee  Universal Protocol:  Consent: Verbal consent obtained.  Risks and benefits: risks, benefits and alternatives were discussed  Consent given by: patient  Patient understanding: patient states understanding of the procedure being performed  Patient consent: the patient's understanding of the procedure matches consent given  Radiology Images displayed and confirmed. If images not available, report reviewed: imaging studies available  Patient identity confirmed: verbally with patient  Supporting Documentation  Indications: pain   Procedure Details  Location: knee - L knee  Needle size: 22 G  Approach: lateral  Medications administered: 2 mL bupivacaine 0.25 " %; 2 mL lidocaine 1 %; 2 mL methylPREDNISolone acetate 40 mg/mL    Patient tolerance: patient tolerated the procedure well with no immediate complications  Dressing:  Sterile dressing applied          Syed Martins DO

## 2024-10-15 LAB
DME PARACHUTE DELIVERY DATE ACTUAL: NORMAL
DME PARACHUTE DELIVERY DATE REQUESTED: NORMAL
DME PARACHUTE ITEM DESCRIPTION: NORMAL
DME PARACHUTE ITEM DESCRIPTION: NORMAL
DME PARACHUTE ORDER STATUS: NORMAL
DME PARACHUTE SUPPLIER NAME: NORMAL
DME PARACHUTE SUPPLIER PHONE: NORMAL

## 2024-10-21 ENCOUNTER — TELEPHONE (OUTPATIENT)
Dept: FAMILY MEDICINE CLINIC | Facility: CLINIC | Age: 64
End: 2024-10-21

## 2024-10-21 NOTE — TELEPHONE ENCOUNTER
PA for ezetimibe-simvastatin (VYTORIN) 10-40 mg per tablet SUBMITTED     via    [x]CMM-KEY: [id number]  []Surescripts-Case ID #   []Availity-Auth ID # [place] #   []Faxed to plan   []Other website   []Phone call Case ID #     Office notes sent, clinical questions answered. Awaiting determination    Turnaround time for your insurance to make a decision on your Prior Authorization can take 7-21 business days.

## 2024-10-22 NOTE — TELEPHONE ENCOUNTER
PA for ezetimibe-simvastatin (VYTORIN) 10-40 mg per tablet  DENIED    Reason:(Screenshot if applicable)        Message sent to office clinical pool Yes    Denial letter scanned into Media Yes    Appeal started No (Provider will need to decide if appeal is warranted and send clinical documentation to Prior Authorization Team for initiation.)    **Please follow up with your patient regarding denial and next steps**

## 2024-10-23 DIAGNOSIS — E78.5 HYPERLIPIDEMIA ASSOCIATED WITH TYPE 2 DIABETES MELLITUS  (HCC): Primary | ICD-10-CM

## 2024-10-23 DIAGNOSIS — E11.69 HYPERLIPIDEMIA ASSOCIATED WITH TYPE 2 DIABETES MELLITUS  (HCC): Primary | ICD-10-CM

## 2024-10-23 RX ORDER — ROSUVASTATIN CALCIUM 40 MG/1
40 TABLET, COATED ORAL DAILY
Qty: 100 TABLET | Refills: 1 | Status: SHIPPED | OUTPATIENT
Start: 2024-10-23 | End: 2024-10-23

## 2024-10-23 RX ORDER — SIMVASTATIN 40 MG
40 TABLET ORAL
Qty: 100 TABLET | Refills: 1 | Status: SHIPPED | OUTPATIENT
Start: 2024-10-23

## 2024-10-23 NOTE — TELEPHONE ENCOUNTER
Notified, patient stated that she took this in the past and it caused her a great deal of inflammation

## 2024-10-23 NOTE — TELEPHONE ENCOUNTER
Okay, I sent simvastatin instead then. It was a component in her old cholesterol medication already

## 2024-11-20 ENCOUNTER — TELEPHONE (OUTPATIENT)
Age: 64
End: 2024-11-20

## 2025-01-20 DIAGNOSIS — E11.9 TYPE 2 DIABETES MELLITUS WITHOUT COMPLICATION, WITHOUT LONG-TERM CURRENT USE OF INSULIN (HCC): ICD-10-CM

## 2025-01-28 DIAGNOSIS — E11.9 TYPE 2 DIABETES MELLITUS WITHOUT COMPLICATION, WITHOUT LONG-TERM CURRENT USE OF INSULIN (HCC): ICD-10-CM

## 2025-01-28 NOTE — TELEPHONE ENCOUNTER
Patient states, pharmacist has no refills on file.     Reason for call:   [x] Refill   [] Prior Auth  [] Other:     Office:   [] PCP/Provider -   [x] Specialty/Provider -  DOMINIQUE Dejesus /  [facility] [place]     Medication: sitaGLIPtin (JANUVIA) 50 mg tablet / Take 1 tablet (50 mg total) by mouth daily     Pharmacy: [id number] - [place] - [facility] [address]     Does the patient have enough for 3 days?   [] Yes   [x] No - Send as HP to POD

## 2025-01-29 PROBLEM — E11.9 TYPE 2 DIABETES MELLITUS WITHOUT COMPLICATION, WITHOUT LONG-TERM CURRENT USE OF INSULIN (HCC): Status: RESOLVED | Noted: 2021-07-01 | Resolved: 2025-01-29

## 2025-01-29 PROBLEM — D69.6 THROMBOCYTOPENIA (HCC): Status: RESOLVED | Noted: 2018-09-26 | Resolved: 2025-01-29

## 2025-01-29 PROBLEM — I20.89 ANGINAL EQUIVALENT (HCC): Status: RESOLVED | Noted: 2023-08-07 | Resolved: 2025-01-29

## 2025-01-29 NOTE — ASSESSMENT & PLAN NOTE
Update lipids -- continue statin   Lab Results   Component Value Date    HGBA1C 6.2 01/30/2025     Orders:  •  CBC and differential; Future  •  Comprehensive metabolic panel; Future  •  Lipid panel; Future

## 2025-01-29 NOTE — ASSESSMENT & PLAN NOTE
A1c 6.2% (from 6.8) -- well controlled. Pt adjusts the doses of her medication based on what she is eating, which is not ideal, but as her blood sugars are in good range, can continue current regimen. F/u with Endo as scheduled.   Lab Results   Component Value Date    HGBA1C 6.2 01/30/2025     Orders:  •  POCT hemoglobin A1c  •  CBC and differential; Future  •  Comprehensive metabolic panel; Future  •  Lipid panel; Future  •  TSH, 3rd generation with Free T4 reflex; Future  •  Albumin / creatinine urine ratio; Future

## 2025-01-29 NOTE — ASSESSMENT & PLAN NOTE
Update TSH and will adjust levothyroxine as needed   Orders:  •  TSH, 3rd generation with Free T4 reflex; Future

## 2025-01-29 NOTE — ASSESSMENT & PLAN NOTE
Within goal in office -- continue current regimen   Lab Results   Component Value Date    HGBA1C 6.2 01/30/2025     Orders:  •  CBC and differential; Future  •  Comprehensive metabolic panel; Future  •  Lipid panel; Future

## 2025-01-29 NOTE — ASSESSMENT & PLAN NOTE
Asymptomatic, continue medical management -- pt states she was told only to f/u if symptomatic   Orders:  •  CBC and differential; Future  •  Comprehensive metabolic panel; Future  •  Lipid panel; Future

## 2025-01-29 NOTE — PROGRESS NOTES
Name: Tong Bolaños      : 1960      MRN: [id number]  Encounter Provider: Ally Nguyen DO  Encounter Date: 2025   Encounter department: [facility]  :  Assessment & Plan  Type 2 diabetes mellitus with morbid obesity (HCC)  A1c 6.2% (from 6.8) -- well controlled. Pt adjusts the doses of her medication based on what she is eating, which is not ideal, but as her blood sugars are in good range, can continue current regimen. F/u with Endo as scheduled.   Lab Results   Component Value Date    HGBA1C 6.2 2025     Orders:  •  POCT hemoglobin A1c  •  CBC and differential; Future  •  Comprehensive metabolic panel; Future  •  Lipid panel; Future  •  TSH, 3rd generation with Free T4 reflex; Future  •  Albumin / creatinine urine ratio; Future    Hypertension complicating diabetes (HCC)  Within goal in office -- continue current regimen   Lab Results   Component Value Date    HGBA1C 6.2 2025     Orders:  •  CBC and differential; Future  •  Comprehensive metabolic panel; Future  •  Lipid panel; Future    Hyperlipidemia associated with type 2 diabetes mellitus  (HCC)  Update lipids -- continue statin   Lab Results   Component Value Date    HGBA1C 6.2 2025     Orders:  •  CBC and differential; Future  •  Comprehensive metabolic panel; Future  •  Lipid panel; Future    Coronary artery disease involving native coronary artery of native heart without angina pectoris  Asymptomatic, continue medical management -- pt states she was told only to f/u if symptomatic   Orders:  •  CBC and differential; Future  •  Comprehensive metabolic panel; Future  •  Lipid panel; Future    Hypothyroidism following radioiodine therapy  Update TSH and will adjust levothyroxine as needed   Orders:  •  TSH, 3rd generation with Free T4 reflex; Future    Chronic pain of left knee    Orders:  •  diclofenac sodium (VOLTAREN) 50 mg EC tablet; Take 1 tablet (50 mg total) by mouth 2 (two) times a day as  "needed (knee pain)    Other fatigue  Unclear etiology of symptoms -- will update labs to evaluate for anemia, thyroid dysfunction, vitamin deficiency. If benign, could consider sleep evaluation.   Orders:  •  CBC and differential; Future  •  Iron Panel (Includes Ferritin, Iron Sat%, Iron, and TIBC); Future  •  TSH, 3rd generation with Free T4 reflex; Future  •  Vitamin D 25 hydroxy; Future  •  Vitamin B12; Future    Rhinorrhea  Discussed trial of OTC allergy medication (such as Claritin) and Flonase        Class 3 severe obesity due to excess calories with serious comorbidity and body mass index (BMI) of 45.0 to 49.9 in adult (HCC)  Has gained a bit of weight since last visit -- continue lifestyle modifications        Healthcare maintenance  Pap UTD  Mammogram DUE -- encouraged to schedule   CRC UTD  Vaccinations: Pneumo defers, TDap defers, Shingles defers, Flu UTD, COVID defers  Encouraged regular physical activity, varied diet, and regular dental/eye exams                 History of Present Illness   HPI    Pt presents for chronic follow up, annual physical     DM: Home sugars 100-110s; does note low sugar if she skips meals   HTN: Home BPs none   HLD: On statin   CAD: ROS as below   Hypothyroidism: ROS as below     Review of Systems   Constitutional:  Positive for fatigue. Negative for unexpected weight change.   HENT:  Positive for congestion (at night), rhinorrhea and sore throat (\"comes and goes\"). Negative for ear pain.    Eyes:  Negative for visual disturbance.   Respiratory:  Negative for cough and shortness of breath.    Cardiovascular:  Negative for chest pain, palpitations and leg swelling.   Gastrointestinal:  Positive for diarrhea (intermittent). Negative for abdominal pain, blood in stool and constipation.   Endocrine: Negative for polyuria.   Genitourinary:  Negative for dysuria, hematuria and vaginal bleeding.   Musculoskeletal:  Positive for arthralgias (notes the diclofenac helps her pain " "significantly, doesn't use daily, only if she is busy/doing more activity -- would like a refill).   Neurological:  Negative for dizziness and headaches.   Psychiatric/Behavioral:  Positive for sleep disturbance.        Objective   /86   Pulse 76   Temp (!) 96.6 °F (35.9 °C)   Ht 5' 6\" (1.676 m)   Wt 130 kg (287 lb)   LMP 09/17/1999 (Exact Date)   SpO2 98%   BMI 46.32 kg/m²      Physical Exam  Vitals and nursing note reviewed.   Constitutional:       General: She is not in acute distress.     Appearance: She is well-developed.   HENT:      Head: Normocephalic and atraumatic.      Right Ear: Tympanic membrane, ear canal and external ear normal.      Left Ear: Tympanic membrane, ear canal and external ear normal.      Nose: Nose normal. No rhinorrhea.      Mouth/Throat:      Mouth: Mucous membranes are moist.      Pharynx: No oropharyngeal exudate or posterior oropharyngeal erythema.   Eyes:      Conjunctiva/sclera: Conjunctivae normal.   Cardiovascular:      Rate and Rhythm: Normal rate and regular rhythm.   Pulmonary:      Effort: Pulmonary effort is normal. No respiratory distress.      Breath sounds: Normal breath sounds.   Abdominal:      General: Bowel sounds are normal. There is no distension.      Palpations: Abdomen is soft.      Tenderness: There is no abdominal tenderness.   Musculoskeletal:      Right lower leg: No edema.      Left lower leg: No edema.      Comments: Ambulating with cane   Lymphadenopathy:      Cervical: No cervical adenopathy.   Skin:     General: Skin is warm and dry.   Neurological:      Mental Status: She is alert.      Comments: Grossly intact   Psychiatric:         Mood and Affect: Mood normal.         "

## 2025-01-30 ENCOUNTER — OFFICE VISIT (OUTPATIENT)
Dept: FAMILY MEDICINE CLINIC | Facility: CLINIC | Age: 65
End: 2025-01-30
Payer: COMMERCIAL

## 2025-01-30 VITALS
WEIGHT: 287 LBS | DIASTOLIC BLOOD PRESSURE: 86 MMHG | HEIGHT: 66 IN | SYSTOLIC BLOOD PRESSURE: 138 MMHG | OXYGEN SATURATION: 98 % | TEMPERATURE: 96.6 F | HEART RATE: 76 BPM | BODY MASS INDEX: 46.12 KG/M2

## 2025-01-30 DIAGNOSIS — E66.01 CLASS 3 SEVERE OBESITY DUE TO EXCESS CALORIES WITH SERIOUS COMORBIDITY AND BODY MASS INDEX (BMI) OF 45.0 TO 49.9 IN ADULT (HCC): ICD-10-CM

## 2025-01-30 DIAGNOSIS — J34.89 RHINORRHEA: ICD-10-CM

## 2025-01-30 DIAGNOSIS — G89.29 CHRONIC PAIN OF LEFT KNEE: ICD-10-CM

## 2025-01-30 DIAGNOSIS — R53.83 OTHER FATIGUE: ICD-10-CM

## 2025-01-30 DIAGNOSIS — E89.0 HYPOTHYROIDISM FOLLOWING RADIOIODINE THERAPY: ICD-10-CM

## 2025-01-30 DIAGNOSIS — E66.813 CLASS 3 SEVERE OBESITY DUE TO EXCESS CALORIES WITH SERIOUS COMORBIDITY AND BODY MASS INDEX (BMI) OF 45.0 TO 49.9 IN ADULT (HCC): ICD-10-CM

## 2025-01-30 DIAGNOSIS — I10 HYPERTENSION COMPLICATING DIABETES (HCC): ICD-10-CM

## 2025-01-30 DIAGNOSIS — E11.9 HYPERTENSION COMPLICATING DIABETES (HCC): ICD-10-CM

## 2025-01-30 DIAGNOSIS — I25.10 CORONARY ARTERY DISEASE INVOLVING NATIVE CORONARY ARTERY OF NATIVE HEART WITHOUT ANGINA PECTORIS: ICD-10-CM

## 2025-01-30 DIAGNOSIS — E66.01 TYPE 2 DIABETES MELLITUS WITH MORBID OBESITY (HCC): Primary | ICD-10-CM

## 2025-01-30 DIAGNOSIS — E78.5 HYPERLIPIDEMIA ASSOCIATED WITH TYPE 2 DIABETES MELLITUS  (HCC): ICD-10-CM

## 2025-01-30 DIAGNOSIS — Z00.00 HEALTHCARE MAINTENANCE: ICD-10-CM

## 2025-01-30 DIAGNOSIS — M25.562 CHRONIC PAIN OF LEFT KNEE: ICD-10-CM

## 2025-01-30 DIAGNOSIS — E11.69 TYPE 2 DIABETES MELLITUS WITH MORBID OBESITY (HCC): Primary | ICD-10-CM

## 2025-01-30 DIAGNOSIS — E11.69 HYPERLIPIDEMIA ASSOCIATED WITH TYPE 2 DIABETES MELLITUS  (HCC): ICD-10-CM

## 2025-01-30 PROBLEM — R06.09 DOE (DYSPNEA ON EXERTION): Status: RESOLVED | Noted: 2021-07-01 | Resolved: 2025-01-30

## 2025-01-30 LAB — SL AMB POCT HEMOGLOBIN AIC: 6.2 (ref ?–6.5)

## 2025-01-30 PROCEDURE — 83036 HEMOGLOBIN GLYCOSYLATED A1C: CPT | Performed by: FAMILY MEDICINE

## 2025-01-30 PROCEDURE — 99396 PREV VISIT EST AGE 40-64: CPT | Performed by: FAMILY MEDICINE

## 2025-01-30 PROCEDURE — 99214 OFFICE O/P EST MOD 30 MIN: CPT | Performed by: FAMILY MEDICINE

## 2025-02-06 ENCOUNTER — TELEPHONE (OUTPATIENT)
Age: 65
End: 2025-02-06

## 2025-02-06 ENCOUNTER — RESULTS FOLLOW-UP (OUTPATIENT)
Dept: FAMILY MEDICINE CLINIC | Facility: CLINIC | Age: 65
End: 2025-02-06

## 2025-02-06 DIAGNOSIS — D64.9 ANEMIA, UNSPECIFIED TYPE: Primary | ICD-10-CM

## 2025-02-06 DIAGNOSIS — E53.8 B12 DEFICIENCY: ICD-10-CM

## 2025-02-06 LAB
25(OH)D3 SERPL-MCNC: 48 NG/ML (ref 30–100)
ALBUMIN SERPL-MCNC: 3.9 G/DL (ref 3.6–5.1)
ALBUMIN/GLOB SERPL: 1.6 (CALC) (ref 1–2.5)
ALP SERPL-CCNC: 92 U/L (ref 37–153)
ALT SERPL-CCNC: 16 U/L (ref 6–29)
AST SERPL-CCNC: 13 U/L (ref 10–35)
BASOPHILS # BLD AUTO: 33 CELLS/UL (ref 0–200)
BASOPHILS NFR BLD AUTO: 0.5 %
BILIRUB SERPL-MCNC: 0.4 MG/DL (ref 0.2–1.2)
BUN SERPL-MCNC: 16 MG/DL (ref 7–25)
BUN/CREAT SERPL: ABNORMAL (CALC) (ref 6–22)
CALCIUM SERPL-MCNC: 9.4 MG/DL (ref 8.6–10.4)
CHLORIDE SERPL-SCNC: 106 MMOL/L (ref 98–110)
CHOLEST SERPL-MCNC: 213 MG/DL
CHOLEST/HDLC SERPL: 4.3 (CALC)
CO2 SERPL-SCNC: 28 MMOL/L (ref 20–32)
CREAT SERPL-MCNC: 0.66 MG/DL (ref 0.5–1.05)
EOSINOPHIL # BLD AUTO: 98 CELLS/UL (ref 15–500)
EOSINOPHIL NFR BLD AUTO: 1.5 %
ERYTHROCYTE [DISTWIDTH] IN BLOOD BY AUTOMATED COUNT: 14.4 % (ref 11–15)
FERRITIN SERPL-MCNC: 68 NG/ML (ref 16–288)
GFR/BSA.PRED SERPLBLD CYS-BASED-ARV: 99 ML/MIN/1.73M2
GLOBULIN SER CALC-MCNC: 2.5 G/DL (CALC) (ref 1.9–3.7)
GLUCOSE SERPL-MCNC: 129 MG/DL (ref 65–99)
HCT VFR BLD AUTO: 34.7 % (ref 35–45)
HDLC SERPL-MCNC: 49 MG/DL
HGB BLD-MCNC: 11.2 G/DL (ref 11.7–15.5)
IRON SATN MFR SERPL: 19 % (CALC) (ref 16–45)
IRON SERPL-MCNC: 54 MCG/DL (ref 45–160)
LDLC SERPL CALC-MCNC: 136 MG/DL (CALC)
LYMPHOCYTES # BLD AUTO: 1417 CELLS/UL (ref 850–3900)
LYMPHOCYTES NFR BLD AUTO: 21.8 %
MCH RBC QN AUTO: 28.9 PG (ref 27–33)
MCHC RBC AUTO-ENTMCNC: 32.3 G/DL (ref 32–36)
MCV RBC AUTO: 89.4 FL (ref 80–100)
MONOCYTES # BLD AUTO: 741 CELLS/UL (ref 200–950)
MONOCYTES NFR BLD AUTO: 11.4 %
NEUTROPHILS # BLD AUTO: 4212 CELLS/UL (ref 1500–7800)
NEUTROPHILS NFR BLD AUTO: 64.8 %
NONHDLC SERPL-MCNC: 164 MG/DL (CALC)
PLATELET # BLD AUTO: 177 THOUSAND/UL (ref 140–400)
PMV BLD REES-ECKER: 12.2 FL (ref 7.5–12.5)
POTASSIUM SERPL-SCNC: 4.9 MMOL/L (ref 3.5–5.3)
PROT SERPL-MCNC: 6.4 G/DL (ref 6.1–8.1)
RBC # BLD AUTO: 3.88 MILLION/UL (ref 3.8–5.1)
SODIUM SERPL-SCNC: 140 MMOL/L (ref 135–146)
TIBC SERPL-MCNC: 278 MCG/DL (CALC) (ref 250–450)
TRIGL SERPL-MCNC: 149 MG/DL
TSH SERPL-ACNC: 2.46 MIU/L (ref 0.4–4.5)
VIT B12 SERPL-MCNC: 1386 PG/ML (ref 200–1100)
WBC # BLD AUTO: 6.5 THOUSAND/UL (ref 3.8–10.8)

## 2025-02-06 RX ORDER — GREEN TEA/HOODIA GORDONII 315-12.5MG
500 CAPSULE ORAL EVERY OTHER DAY
Start: 2025-02-06

## 2025-02-06 NOTE — TELEPHONE ENCOUNTER
Patient returned call. The following message was relayed:     2/6/25 10:34 AM  Result Note  Please let pt know:  Cholesterol increased from last check. Is she still taking her statin? Kidney and liver function normal Vit B12 too high -- what dose of supplement is she taking? Vit D in good range -- continue current supplement Thyroid function normal. Still anemic, those similar to last check. Iron levels stable. Has she ever seen Hematology (blood specialist) for this? It looks like it is an ongoing issue.    Patient stated she has been taking her Simvastatin 40 mg everyday. Patient also stated she has been taking 1 drop of liquid B12 500 mg daily. Patient also stated she has never seen hematology before. She stated it was suggested to her years ago.     Patient would like a return call with any further recommendations.

## 2025-02-06 NOTE — TELEPHONE ENCOUNTER
Patient stated that she is having some difficulty with some pain in her lungs. Patient stated it started on 2/5.     Patient reported that she did 2 nebulizer treatments and used Vicks vapor rub and it helped. Patient stated it feels like asthma.     Patient is requesting prednisone. I attempted to schedule patient for a visit to be evaluated and patient stated she does not have a ride. Patient is requesting for recommendations.     Please advise  Thank you

## 2025-02-12 DIAGNOSIS — I10 ESSENTIAL HYPERTENSION: ICD-10-CM

## 2025-02-13 RX ORDER — LISINOPRIL 20 MG/1
20 TABLET ORAL DAILY
Qty: 90 TABLET | Refills: 1 | Status: SHIPPED | OUTPATIENT
Start: 2025-02-13

## 2025-03-19 ENCOUNTER — HOSPITAL ENCOUNTER (OUTPATIENT)
Dept: MAMMOGRAPHY | Facility: CLINIC | Age: 65
Discharge: HOME | End: 2025-03-19
Payer: COMMERCIAL

## 2025-03-19 DIAGNOSIS — Z12.31 SCREENING MAMMOGRAM FOR BREAST CANCER: ICD-10-CM

## 2025-03-19 PROCEDURE — 77067 SCR MAMMO BI INCL CAD: CPT

## 2025-03-19 PROCEDURE — 77063 BREAST TOMOSYNTHESIS BI: CPT

## 2025-03-24 ENCOUNTER — RESULTS FOLLOW-UP (OUTPATIENT)
Dept: FAMILY MEDICINE CLINIC | Facility: CLINIC | Age: 65
End: 2025-03-24

## 2025-03-25 DIAGNOSIS — I10 ESSENTIAL HYPERTENSION: ICD-10-CM

## 2025-03-26 RX ORDER — CARVEDILOL 12.5 MG/1
12.5 TABLET ORAL 2 TIMES DAILY
Qty: 180 TABLET | Refills: 1 | Status: SHIPPED | OUTPATIENT
Start: 2025-03-26

## 2025-04-09 ENCOUNTER — TELEPHONE (OUTPATIENT)
Age: 65
End: 2025-04-09

## 2025-04-09 DIAGNOSIS — I20.89 ANGINAL EQUIVALENT (HCC): ICD-10-CM

## 2025-04-09 RX ORDER — HYDROCHLOROTHIAZIDE 12.5 MG/1
12.5 TABLET ORAL DAILY
Qty: 90 TABLET | Refills: 3 | Status: SHIPPED | OUTPATIENT
Start: 2025-04-09

## 2025-04-09 NOTE — TELEPHONE ENCOUNTER
Pt called stating she is all out of the Hydrochlorathiazed medication and no longer see's cardiology.     Pt asking if a prescription can be sent to [facility], [place]?  States her feet are starting to swell. Pt does not c/o SOB.  Pt did not wish to schedule an appointment at this time and will call back in a few days.     Please advise

## 2025-04-09 NOTE — TELEPHONE ENCOUNTER
I sent in the hctz but she needs to follow up with cardiology. She has a hx of CAD with LAD stenting

## 2025-04-11 NOTE — TELEPHONE ENCOUNTER
I spoke with Tong, she said that cardiology told her they will not see her any longer because there is nothing wrong with her heart.

## 2025-05-06 ENCOUNTER — OFFICE VISIT (OUTPATIENT)
Dept: FAMILY MEDICINE CLINIC | Facility: CLINIC | Age: 65
End: 2025-05-06
Payer: COMMERCIAL

## 2025-05-06 VITALS
BODY MASS INDEX: 46.28 KG/M2 | HEIGHT: 66 IN | OXYGEN SATURATION: 100 % | HEART RATE: 78 BPM | DIASTOLIC BLOOD PRESSURE: 82 MMHG | WEIGHT: 288 LBS | TEMPERATURE: 97.1 F | SYSTOLIC BLOOD PRESSURE: 126 MMHG

## 2025-05-06 DIAGNOSIS — R05.9 COUGH, UNSPECIFIED TYPE: ICD-10-CM

## 2025-05-06 DIAGNOSIS — E66.01 TYPE 2 DIABETES MELLITUS WITH MORBID OBESITY (HCC): ICD-10-CM

## 2025-05-06 DIAGNOSIS — J40 BRONCHITIS: Primary | ICD-10-CM

## 2025-05-06 DIAGNOSIS — E11.69 TYPE 2 DIABETES MELLITUS WITH MORBID OBESITY (HCC): ICD-10-CM

## 2025-05-06 LAB
SARS-COV-2 AG UPPER RESP QL IA: NEGATIVE
SL AMB POCT RAPID FLU A: NEGATIVE
SL AMB POCT RAPID FLU B: NEGATIVE
VALID CONTROL: NORMAL

## 2025-05-06 PROCEDURE — 87804 INFLUENZA ASSAY W/OPTIC: CPT | Performed by: PHYSICIAN ASSISTANT

## 2025-05-06 PROCEDURE — 99214 OFFICE O/P EST MOD 30 MIN: CPT | Performed by: PHYSICIAN ASSISTANT

## 2025-05-06 PROCEDURE — 87811 SARS-COV-2 COVID19 W/OPTIC: CPT | Performed by: PHYSICIAN ASSISTANT

## 2025-05-06 RX ORDER — AZITHROMYCIN 250 MG/1
TABLET, FILM COATED ORAL
Qty: 6 TABLET | Refills: 0 | Status: SHIPPED | OUTPATIENT
Start: 2025-05-06 | End: 2025-05-11

## 2025-05-06 RX ORDER — BENZONATATE 200 MG/1
200 CAPSULE ORAL 3 TIMES DAILY PRN
Qty: 20 CAPSULE | Refills: 0 | Status: SHIPPED | OUTPATIENT
Start: 2025-05-06

## 2025-05-06 NOTE — PROGRESS NOTES
Name: Tong Bolaños      : 1960      MRN: [id number]  Encounter Provider: Sonny Estrada PA-C  Encounter Date: 2025   Encounter department: [facility]    Assessment & Plan  Bronchitis  Exam with some scattered rhonchi otherwise clear with normal vitals  Covid/flu negative  Tx bronchitis with azithromycin, prn tessalon  Orders:  •  azithromycin (Zithromax) 250 mg tablet; Take 2 tablets (500 mg total) by mouth daily for 1 day, THEN 1 tablet (250 mg total) daily for 4 days.  •  benzonatate (TESSALON) 200 MG capsule; Take 1 capsule (200 mg total) by mouth 3 (three) times a day as needed for cough    Cough, unspecified type    Orders:  •  POCT Rapid Covid Ag  •  POCT rapid flu A and B    Type 2 diabetes mellitus with morbid obesity (HCC)  Routine labs ordered  Good control, follows with endo  Lab Results   Component Value Date    HGBA1C 6.2 2025       Orders:  •  Albumin / creatinine urine ratio; Future  •  Comprehensive metabolic panel; Future  •  Hemoglobin A1C; Future  •  CBC and differential; Future  •  Lipid Panel with Direct LDL reflex; Future         History of Present Illness     Pt presents with 4 days of cough, loss of voice, congestion  Feels feverish  Feels some SOB  O2 100% in office, afebrile       Review of Systems   Constitutional:  Negative for chills, fatigue and fever.   HENT:  Positive for congestion, rhinorrhea and sinus pressure. Negative for ear pain, hearing loss, nosebleeds, postnasal drip, sinus pain, sneezing and sore throat.    Eyes:  Negative for pain, discharge, itching and visual disturbance.   Respiratory:  Positive for cough and shortness of breath. Negative for chest tightness and wheezing.    Cardiovascular:  Negative for chest pain, palpitations and leg swelling.   Gastrointestinal:  Negative for abdominal pain, blood in stool, constipation, diarrhea, nausea and vomiting.   Genitourinary:  Negative for frequency and urgency.   Neurological:   Negative for dizziness, light-headedness and numbness.     Past Medical History:   Diagnosis Date   • Anginal equivalent (HCC) 08/07/2023   • COVID-19 09/06/2023   • Diabetes mellitus (HCC)    • Disease of thyroid gland    • Hypertension      Past Surgical History:   Procedure Laterality Date   • CORONARY ANGIOPLASTY WITH STENT PLACEMENT      LAD     Family History   Problem Relation Age of Onset   • Cervical cancer Mother         hysterectomy   • Cancer Sister    • Diabetes Daughter    • Diabetes Maternal Grandmother    • Prostate cancer Maternal Grandfather    • Stomach cancer Paternal Grandmother    • Asthma Paternal Grandfather    • Breast cancer Maternal Aunt         masectomy   • Breast cancer Maternal Aunt         found lump in nipple   • Heart disease Paternal Aunt    • Heart disease Paternal Aunt    • Colon cancer Brother      Social History     Tobacco Use   • Smoking status: Never     Passive exposure: Never   • Smokeless tobacco: Never   Vaping Use   • Vaping status: Never Used   Substance and Sexual Activity   • Alcohol use: Never   • Drug use: Never   • Sexual activity: Not Currently     Partners: Male     Birth control/protection: Post-menopausal     Current Outpatient Medications on File Prior to Visit   Medication Sig   • Aspirin Low Dose 81 MG EC tablet TAKE 1 TABLET BY MOUTH EVERY DAY   • Blood Glucose Monitoring Suppl (OneTouch Verio Reflect) w/Device KIT Check blood sugars once daily. Please substitute with appropriate alternative as covered by patient's insurance. Dx: E11.65   • carvedilol (COREG) 12.5 mg tablet take 1 tablet by mouth twice a day   • Cyanocobalamin (B-12) 500 MCG SUBL Place 1 tablet (500 mcg total) under the tongue every other day   • diclofenac sodium (VOLTAREN) 50 mg EC tablet Take 1 tablet (50 mg total) by mouth 2 (two) times a day as needed (knee pain)   • glucose blood (OneTouch Verio) test strip Check blood sugars once daily. Please substitute with appropriate alternative  "as covered by patient's insurance. Dx: E11.65   • hydroCHLOROthiazide 12.5 mg tablet Take 1 tablet (12.5 mg total) by mouth daily   • levothyroxine 175 mcg tablet take 1 tablet by mouth daily and ON SUNDAYS 1 1/2 TABLETS. TOTAL OF 7.5 TABLETS WEEKLY   • lisinopril (ZESTRIL) 20 mg tablet take 1 tablet by mouth once daily   • metFORMIN (GLUCOPHAGE) 1000 MG tablet take 1 tablet by mouth twice a day with meals   • OneTouch Delica Lancets 33G MISC Check blood sugars once daily. Please substitute with appropriate alternative as covered by patient's insurance. Dx: E11.65   • simvastatin (ZOCOR) 40 mg tablet Take 1 tablet (40 mg total) by mouth daily at bedtime   • sitaGLIPtin (JANUVIA) 50 mg tablet Take 1 tablet (50 mg total) by mouth daily     Allergies   Allergen Reactions   • Kiwi Extract - Food Allergy Anaphylaxis   • Shrimp (Diagnostic) - Food Allergy Anaphylaxis   • Shellfish-Derived Products - Food Allergy Rash     All Seafood    • Latex Hives   • Nuts - Food Allergy Swelling     walnuts     Immunization History   Administered Date(s) Administered   • COVID-19 MODERNA VACC 0.5 ML IM 07/05/2021, 08/07/2021   • INFLUENZA 08/30/2022   • Influenza Recombinant Preservative Free Im 10/09/2024   • Influenza, injectable, quadrivalent, preservative free 0.5 mL 08/30/2022, 10/30/2023   • Influenza, recombinant, quadrivalent,injectable, preservative free 10/04/2021     Objective   /82   Pulse 78   Temp (!) 97.1 °F (36.2 °C)   Ht 5' 6\" (1.676 m)   Wt 131 kg (288 lb)   LMP 09/17/1999 (Exact Date)   SpO2 100%   BMI 46.48 kg/m²     Physical Exam  Vitals and nursing note reviewed.   Constitutional:       General: She is not in acute distress.     Appearance: She is well-developed.   HENT:      Head: Normocephalic and atraumatic.      Right Ear: Tympanic membrane normal.      Left Ear: Tympanic membrane normal.      Nose: Nose normal.   Eyes:      Conjunctiva/sclera: Conjunctivae normal.   Cardiovascular:      Rate and " Rhythm: Normal rate and regular rhythm.      Heart sounds: No murmur heard.  Pulmonary:      Effort: Pulmonary effort is normal. No respiratory distress.      Breath sounds: Rhonchi present. No wheezing or rales.   Musculoskeletal:         General: No swelling.      Cervical back: Neck supple.   Skin:     General: Skin is warm and dry.      Capillary Refill: Capillary refill takes less than 2 seconds.   Neurological:      Mental Status: She is alert.   Psychiatric:         Mood and Affect: Mood normal.

## 2025-05-06 NOTE — ASSESSMENT & PLAN NOTE
Routine labs ordered  Good control, follows with endo  Lab Results   Component Value Date    HGBA1C 6.2 01/30/2025       Orders:  •  Albumin / creatinine urine ratio; Future  •  Comprehensive metabolic panel; Future  •  Hemoglobin A1C; Future  •  CBC and differential; Future  •  Lipid Panel with Direct LDL reflex; Future

## 2025-05-20 LAB
ALBUMIN SERPL-MCNC: 4.1 G/DL (ref 3.6–5.1)
ALBUMIN/CREAT UR: 7 MG/G CREAT
ALBUMIN/GLOB SERPL: 1.6 (CALC) (ref 1–2.5)
ALP SERPL-CCNC: 86 U/L (ref 37–153)
ALT SERPL-CCNC: 21 U/L (ref 6–29)
AST SERPL-CCNC: 15 U/L (ref 10–35)
BASOPHILS # BLD AUTO: 38 CELLS/UL (ref 0–200)
BASOPHILS NFR BLD AUTO: 0.6 %
BILIRUB SERPL-MCNC: 0.3 MG/DL (ref 0.2–1.2)
BUN SERPL-MCNC: 19 MG/DL (ref 7–25)
BUN/CREAT SERPL: ABNORMAL (CALC) (ref 6–22)
CALCIUM SERPL-MCNC: 9.2 MG/DL (ref 8.6–10.4)
CHLORIDE SERPL-SCNC: 106 MMOL/L (ref 98–110)
CHOLEST SERPL-MCNC: 189 MG/DL
CHOLEST/HDLC SERPL: 3.6 (CALC)
CO2 SERPL-SCNC: 28 MMOL/L (ref 20–32)
CREAT SERPL-MCNC: 0.61 MG/DL (ref 0.5–1.05)
CREAT UR-MCNC: 114 MG/DL (ref 20–275)
EOSINOPHIL # BLD AUTO: 107 CELLS/UL (ref 15–500)
EOSINOPHIL NFR BLD AUTO: 1.7 %
ERYTHROCYTE [DISTWIDTH] IN BLOOD BY AUTOMATED COUNT: 15.2 % (ref 11–15)
GFR/BSA.PRED SERPLBLD CYS-BASED-ARV: 100 ML/MIN/1.73M2
GLOBULIN SER CALC-MCNC: 2.5 G/DL (CALC) (ref 1.9–3.7)
GLUCOSE SERPL-MCNC: 138 MG/DL (ref 65–99)
HBA1C MFR BLD: 6.6 %
HCT VFR BLD AUTO: 35.8 % (ref 35–45)
HDLC SERPL-MCNC: 52 MG/DL
HGB BLD-MCNC: 11.3 G/DL (ref 11.7–15.5)
LDLC SERPL CALC-MCNC: 114 MG/DL (CALC)
LYMPHOCYTES # BLD AUTO: 1682 CELLS/UL (ref 850–3900)
LYMPHOCYTES NFR BLD AUTO: 26.7 %
MCH RBC QN AUTO: 28.8 PG (ref 27–33)
MCHC RBC AUTO-ENTMCNC: 31.6 G/DL (ref 32–36)
MCV RBC AUTO: 91.1 FL (ref 80–100)
MICROALBUMIN UR-MCNC: 0.8 MG/DL
MONOCYTES # BLD AUTO: 611 CELLS/UL (ref 200–950)
MONOCYTES NFR BLD AUTO: 9.7 %
NEUTROPHILS # BLD AUTO: 3862 CELLS/UL (ref 1500–7800)
NEUTROPHILS NFR BLD AUTO: 61.3 %
NONHDLC SERPL-MCNC: 137 MG/DL (CALC)
PLATELET # BLD AUTO: 149 THOUSAND/UL (ref 140–400)
PMV BLD REES-ECKER: 12.9 FL (ref 7.5–12.5)
POTASSIUM SERPL-SCNC: 4.6 MMOL/L (ref 3.5–5.3)
PROT SERPL-MCNC: 6.6 G/DL (ref 6.1–8.1)
RBC # BLD AUTO: 3.93 MILLION/UL (ref 3.8–5.1)
SODIUM SERPL-SCNC: 140 MMOL/L (ref 135–146)
TRIGL SERPL-MCNC: 123 MG/DL
WBC # BLD AUTO: 6.3 THOUSAND/UL (ref 3.8–10.8)

## 2025-05-21 ENCOUNTER — RESULTS FOLLOW-UP (OUTPATIENT)
Dept: FAMILY MEDICINE CLINIC | Facility: CLINIC | Age: 65
End: 2025-05-21

## 2025-05-21 DIAGNOSIS — D64.9 ANEMIA, UNSPECIFIED TYPE: Primary | ICD-10-CM

## 2025-05-21 NOTE — TELEPHONE ENCOUNTER
Patient returned call, relayed results to patient as per provider message.     Patient expressed understanding and she is asking what her iron level was.     She would like a call back to discuss.       Thank you.

## 2025-05-22 ENCOUNTER — OFFICE VISIT (OUTPATIENT)
Dept: HEMATOLOGY ONCOLOGY | Facility: CLINIC | Age: 65
End: 2025-05-22
Payer: COMMERCIAL

## 2025-05-22 VITALS
BODY MASS INDEX: 46.69 KG/M2 | RESPIRATION RATE: 18 BRPM | OXYGEN SATURATION: 98 % | HEIGHT: 66 IN | TEMPERATURE: 98.1 F | HEART RATE: 84 BPM | SYSTOLIC BLOOD PRESSURE: 136 MMHG | DIASTOLIC BLOOD PRESSURE: 78 MMHG | WEIGHT: 290.5 LBS

## 2025-05-22 DIAGNOSIS — D64.9 NORMOCYTIC ANEMIA: Primary | ICD-10-CM

## 2025-05-22 DIAGNOSIS — E53.8 B12 DEFICIENCY: ICD-10-CM

## 2025-05-22 DIAGNOSIS — R49.9 CHANGE IN VOICE: ICD-10-CM

## 2025-05-22 PROCEDURE — 99204 OFFICE O/P NEW MOD 45 MIN: CPT | Performed by: PHYSICIAN ASSISTANT

## 2025-05-22 NOTE — PROGRESS NOTES
Name: Tong Bolaños      : 1960      MRN: [id number]  Encounter Provider: Shira Ramirez PA-C  Encounter Date: 2025   Encounter department: [facility] [place]  :  Assessment & Plan  Normocytic anemia  Mild, Hgb 11.3, stable for many years. No other cytopenias. Unclear etiology. Meds reviewed, none contributing.   This is unlikely the cause of her new fatigue given there has been no recent change in her hemoglobin. Her B12 deficiency may be causing fatigue, agree with resumption of B12 therapy as below. We will perform additional studies to work up her anemia as below.   She was evaluated by hematology previously for what sounds like ITP? We will attempt to obtain prior hematology records. Pt will review documents at home ans let us know where she was previously seen   Follow up in 6 weeks to review results     Orders:    cyanocobalamin (VITAMIN B-12) 2500 MCG TABS; Take 2 tablets (5,000 mcg total) by mouth daily    Ferritin; Future    Vitamin B12; Future    Reticulocytes; Future    LD,Blood; Future    Protein electrophoresis, serum; Future    C-reactive protein; Future    MISCELLANEOUS LAB TEST; Future    CBC and differential; Future    B12 deficiency  Check b12 level. She was taking OTC b12 5,000mcg daily previously with B12 to goal. Resume prior therapy yesterday. Continue current dose.   Orders:    cyanocobalamin (VITAMIN B-12) 2500 MCG TABS; Take 2 tablets (5,000 mcg total) by mouth daily    Vitamin B12; Future    Change in voice  US thryoid recommend due to her history of graves s/p ZHAO, if unrevealing would recommend ENT evaluation   Orders:    US thyroid; Future      Assessment & Plan        Return in about 6 weeks (around 7/3/2025) for Office Visit.    History of Present Illness   Chief Complaint   Patient presents with    Consult     History of Present Illness  Tong Bolaños is a 64-year-old female with past medical history of CVA, CAD s/p  PCI, hypertension, obesity, HLD, anemia, type 2 diabetes, OA, colon polyps, Graves' disease s/p radioiodine therapy who has been referred by PCP for evaluation of fatigue.    Patient was recently evaluated by PCP 01/2025 for complaints of fatigue.  Complete blood count was performed which showed normocytic anemia with hemoglobin 11.2, MCV 89.  On chart review, she has had a chronic normocytic anemia with hemoglobin around 11 g/dL since at least 2021.  There are no labs prior to this available for review however patient reports she had anemia her whole life, including during childhood. She had additional workup for her anemia around 2023 which showed normal B12, folate, ferritin, thyroid level.  B12/iron panel was repeated 10/2024.  B12 level was decreased from around 700 to 237. States she was on B12 prior to this and had stopped. Iron panel remain normal. She is now back on B12.     She has been seen by hematology in [place] previously for low platelets. This was attributed to Graves disease. She radiation for this. She states she never required treatment such as steroids, IVIG. She has never been hospitalized for low PLT/anemia or required transfusions. She does not know name of hematology practice.    She is up-to-date on colonoscopy, last performed 06/2022.  History of colon polyps (tubular adenoma), family history of colon cancer. She has no known liver or kidney disease.     No alcohol, tobacco or drug use. 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     No personal history of cancer. Sister has cancer (unknown type, 50 at diagnosis), brother had colon cancer at 51yo, maternal grandfather prostate cancer, mother has cervical cancer, 3 maternal aunt had breast cancer. She believes she had genetic testing previously. She believes she tested positive       Granddaughter had anemia, no other history of anemia.     Pertinent Medical History     05/22/25: she had spontaneous bruise on left cheek a coupe weeks ago.      Review  "of Systems   Constitutional:  Positive for fatigue. Negative for fever and unexpected weight change.        No night sweats      Respiratory:  Negative for shortness of breath.    Cardiovascular:  Negative for chest pain.   Gastrointestinal:  Negative for blood in stool.        No melena    Genitourinary:  Negative for hematuria and vaginal bleeding.   Musculoskeletal:  Positive for arthralgias (she has had chronic knee pain for past 3 years, left hip pain and left wrist pain both intermittent).   Skin:  Negative for rash.   Neurological:  Positive for dizziness (postural). Negative for light-headedness.   Hematological:  Negative for adenopathy.   All other systems reviewed and are negative.          Objective   /78 (BP Location: Left arm, Patient Position: Sitting, Cuff Size: Large)   Pulse 84   Temp 98.1 °F (36.7 °C) (Temporal)   Resp 18   Ht 5' 6\" (1.676 m)   Wt 132 kg (290 lb 8 oz)   LMP 09/17/1999 (Exact Date)   SpO2 98%   BMI 46.89 kg/m²     Physical Exam  Vitals reviewed.   HENT:      Head: Normocephalic.     Cardiovascular:      Rate and Rhythm: Normal rate and regular rhythm.   Pulmonary:      Effort: Pulmonary effort is normal.      Breath sounds: Normal breath sounds.   Abdominal:      Palpations: Abdomen is soft.      Tenderness: There is no abdominal tenderness.     Musculoskeletal:      Cervical back: Neck supple.   Lymphadenopathy:      Cervical: No cervical adenopathy.      Upper Body:      Right upper body: No supraclavicular or axillary adenopathy.      Left upper body: No supraclavicular or axillary adenopathy.     Skin:     Findings: No rash.     Neurological:      Mental Status: She is alert.       Physical Exam      Results    Labs: I have reviewed the following labs:  Results for orders placed or performed in visit on 05/20/25   Lipid Panel with Direct LDL reflex   Result Value Ref Range    Total Cholesterol 189 <200 mg/dL    HDL 52 > OR = 50 mg/dL    Triglycerides 123 <150 " mg/dL    LDL Calculated 114 (H) mg/dL (calc)    Chol HDLC Ratio 3.6 <5.0 (calc)    Non-HDL Cholesterol 137 (H) <130 mg/dL (calc)   Microalbumin, Random Urine (W/Creatinine)   Result Value Ref Range    Creatinine, Urine 114 20 - 275 mg/dL    Albumin,U,Random 0.8 See Note: mg/dL    Microalb/Creat Ratio 7 <30 mg/g creat   Comprehensive metabolic panel   Result Value Ref Range    Glucose, Random 138 (H) 65 - 99 mg/dL    BUN 19 7 - 25 mg/dL    Creatinine 0.61 0.50 - 1.05 mg/dL    eGFR 100 > OR = 60 mL/min/1.73m2    SL AMB BUN/CREATININE RATIO SEE NOTE: 6 - 22 (calc)    Sodium 140 135 - 146 mmol/L    Potassium 4.6 3.5 - 5.3 mmol/L    Chloride 106 98 - 110 mmol/L    CO2 28 20 - 32 mmol/L    Calcium 9.2 8.6 - 10.4 mg/dL    Protein, Total 6.6 6.1 - 8.1 g/dL    Albumin 4.1 3.6 - 5.1 g/dL    Globulin 2.5 1.9 - 3.7 g/dL (calc)    Albumin/Globulin Ratio 1.6 1.0 - 2.5 (calc)    TOTAL BILIRUBIN 0.3 0.2 - 1.2 mg/dL    Alkaline Phosphatase 86 37 - 153 U/L    AST 15 10 - 35 U/L    ALT 21 6 - 29 U/L   CBC and differential   Result Value Ref Range    White Blood Cell Count 6.3 3.8 - 10.8 Thousand/uL    Red Blood Cell Count 3.93 3.80 - 5.10 Million/uL    Hemoglobin 11.3 (L) 11.7 - 15.5 g/dL    HCT 35.8 35.0 - 45.0 %    MCV 91.1 80.0 - 100.0 fL    MCH 28.8 27.0 - 33.0 pg    MCHC 31.6 (L) 32.0 - 36.0 g/dL    RDW 15.2 (H) 11.0 - 15.0 %    Platelet Count 149 140 - 400 Thousand/uL    SL AMB MPV 12.9 (H) 7.5 - 12.5 fL    Neutrophils (Absolute) 3,862 1,500 - 7,800 cells/uL    Lymphocytes (Absolute) 1,682 850 - 3,900 cells/uL    Monocytes (Absolute) 611 200 - 950 cells/uL    Eosinophils (Absolute) 107 15 - 500 cells/uL    Basophils ABS 38 0 - 200 cells/uL    Neutrophils 61.3 %    Lymphocytes 26.7 %    Monocytes 9.7 %    Eosinophils 1.7 %    Basophils PCT 0.6 %   Hemoglobin A1c (w/out EAG)   Result Value Ref Range    Hemoglobin A1C 6.6 (H) <5.7 %

## 2025-05-27 ENCOUNTER — TELEPHONE (OUTPATIENT)
Age: 65
End: 2025-05-27

## 2025-05-27 DIAGNOSIS — Z12.12 SCREENING FOR COLORECTAL CANCER: Primary | ICD-10-CM

## 2025-05-27 DIAGNOSIS — Z12.11 SCREENING FOR COLORECTAL CANCER: Primary | ICD-10-CM

## 2025-05-27 NOTE — TELEPHONE ENCOUNTER
Patient states [facility] advised her to ask PCP to order a ColoGuard test for her. She requests a call back to advise.

## 2025-05-28 NOTE — PROGRESS NOTES
Name: Tong Bolaños      : 1960      MRN: [id number]  Encounter Provider: DOMINIQUE Dejesus  Encounter Date: 2025   Encounter department: [facility]  :  Assessment & Plan  Type 2 diabetes mellitus with morbid obesity (HCC)  A1C has increased slightly due to stopping Januvia. Unfortunately, patient is not able to afford Januvia, presumably due to a high deductible as it was affordable at the end of . Currently taking metformin 500 mg daily with breakfast and 1,000 mg nightly with dinner. Continue current regimen. I do not believe she will benefit from other cost efficient options like glimepiride, glipizide, or pioglitazone d/t increased risk of hypoglycemia and gi s/e. Referral given for MNT. Pt reports some symptoms of gastroparesis therefore, avoiding GLP-1 at this time- I also expect that would be cost prohibitive. Encouraged to be mindful of diet. Repeat A1C in 3 months. Should A1C increase, will increase metformin to 1,000 mg twice daily with meals. F/U in 6 months.    Lab Results   Component Value Date    HGBA1C 6.6 (H) 2025       Orders:    Ambulatory Referral to Diabetic Education; Future    metFORMIN (GLUCOPHAGE) 1000 MG tablet; Take 1/2 tablet (500 mg) daily with breakfast and 1 tablet (1000 mg) with dinner.    Hemoglobin A1C; Future    Basic metabolic panel; Future    Hemoglobin A1C; Future    Hypothyroidism following radioiodine therapy  Thyroid function is stable. Continue levothyroxine 175 mcg Monday-Saturday. Take 1.5 tablets on . Check TFT in 3 months with A1C. F/U in 6 months.   Orders:    TSH, 3rd generation; Future    T4, free; Future    levothyroxine 175 mcg tablet; take 1 tablet by mouth daily and ON SUNDAYS 1 1/2 TABLETS. TOTAL OF 7.5 TABLETS WEEKLY    Hyperlipidemia associated with type 2 diabetes mellitus  (HCC)  Continue 40 mg of atorvastatin nightly.   Lab Results   Component Value Date    HGBA1C 6.6  (H) 05/20/2025              Assessment & Plan        History of Present Illness   History of Present Illness    Tong Bolaños is a 64 y.o. female with type 2 diabetes without long term use of insulin presenting today for follow up.    At patient's last appointment on 10/2/2024, Januvia was added to her regimen.   Hemoglobin A1C   Latest Ref Rng <5.7 %   1/30/2025 6.2    5/20/2025 6.6 (H)       Legend:  (H) High  Current regimen:    Januvia 50 mg daily (pt stopped taking due to cost)  Metformin 1,000 mg twice daily (reports taking 1/2 tablet in the morning and whole tablet with dinner)    For hypothyroidism, she is taking 175 mcg of levothyroxine daily. TSH from 2/5/2025 is stable at 2.46.     TSH W/RFX TO FREE T4   Latest Ref Rng 0.40 - 4.50 mIU/L   2/5/2025 2.46              Review of Systems   Constitutional:  Positive for fatigue. Negative for activity change, appetite change and unexpected weight change.   HENT:  Negative for dental problem, sore throat, trouble swallowing and voice change.    Eyes:  Negative for visual disturbance.   Respiratory:  Negative for cough, chest tightness and shortness of breath.    Cardiovascular:  Negative for chest pain, palpitations and leg swelling.   Gastrointestinal:  Positive for abdominal distention. Negative for constipation, diarrhea, nausea and vomiting.   Endocrine: Negative for cold intolerance, heat intolerance, polydipsia, polyphagia and polyuria.   Genitourinary:  Negative for frequency.   Musculoskeletal:  Positive for arthralgias. Negative for back pain, gait problem and myalgias.   Skin:  Negative for wound.   Allergic/Immunologic: Positive for environmental allergies and food allergies.   Neurological:  Negative for dizziness, weakness, light-headedness, numbness and headaches.   Psychiatric/Behavioral:  Negative for decreased concentration, dysphoric mood and sleep disturbance. The patient is not nervous/anxious.           Objective   /74 (BP Location:  "Left arm, Patient Position: Sitting, Cuff Size: Standard)   Pulse 84   Temp 97.6 °F (36.4 °C) (Tympanic)   Ht 5' 6\" (1.676 m)   Wt 132 kg (292 lb)   LMP 09/17/1999 (Exact Date)   SpO2 97%   BMI 47.13 kg/m²      Physical Exam  Vitals reviewed.   Constitutional:       General: She is not in acute distress.     Appearance: She is well-developed. She is obese. She is not ill-appearing.   HENT:      Head: Normocephalic and atraumatic.     Eyes:      Conjunctiva/sclera: Conjunctivae normal.       Cardiovascular:      Rate and Rhythm: Normal rate and regular rhythm.      Pulses: Normal pulses.      Heart sounds: Normal heart sounds. No murmur heard.  Pulmonary:      Effort: Pulmonary effort is normal. No respiratory distress.      Breath sounds: Normal breath sounds.   Abdominal:      Palpations: Abdomen is soft.      Tenderness: There is no abdominal tenderness.     Musculoskeletal:         General: No swelling.      Cervical back: Normal range of motion and neck supple.      Right lower leg: No edema.      Left lower leg: No edema.     Skin:     General: Skin is warm and dry.      Capillary Refill: Capillary refill takes less than 2 seconds.     Neurological:      Mental Status: She is alert.     Psychiatric:         Mood and Affect: Mood normal.       Physical Exam      Results      "

## 2025-05-29 ENCOUNTER — OFFICE VISIT (OUTPATIENT)
Dept: ENDOCRINOLOGY | Facility: CLINIC | Age: 65
End: 2025-05-29
Payer: COMMERCIAL

## 2025-05-29 VITALS
HEIGHT: 66 IN | TEMPERATURE: 97.6 F | WEIGHT: 292 LBS | HEART RATE: 84 BPM | BODY MASS INDEX: 46.93 KG/M2 | OXYGEN SATURATION: 97 % | DIASTOLIC BLOOD PRESSURE: 74 MMHG | SYSTOLIC BLOOD PRESSURE: 126 MMHG

## 2025-05-29 DIAGNOSIS — E11.69 TYPE 2 DIABETES MELLITUS WITH MORBID OBESITY (HCC): Primary | ICD-10-CM

## 2025-05-29 DIAGNOSIS — E11.69 HYPERLIPIDEMIA ASSOCIATED WITH TYPE 2 DIABETES MELLITUS  (HCC): ICD-10-CM

## 2025-05-29 DIAGNOSIS — E78.5 HYPERLIPIDEMIA ASSOCIATED WITH TYPE 2 DIABETES MELLITUS  (HCC): ICD-10-CM

## 2025-05-29 DIAGNOSIS — E89.0 HYPOTHYROIDISM FOLLOWING RADIOIODINE THERAPY: ICD-10-CM

## 2025-05-29 DIAGNOSIS — E66.01 TYPE 2 DIABETES MELLITUS WITH MORBID OBESITY (HCC): Primary | ICD-10-CM

## 2025-05-29 PROCEDURE — 99214 OFFICE O/P EST MOD 30 MIN: CPT | Performed by: NURSE PRACTITIONER

## 2025-05-29 RX ORDER — LEVOTHYROXINE SODIUM 175 UG/1
TABLET ORAL
Qty: 90 TABLET | Refills: 1 | Status: SHIPPED | OUTPATIENT
Start: 2025-05-29

## 2025-05-29 NOTE — ASSESSMENT & PLAN NOTE
A1C has increased slightly due to stopping Januvia. Unfortunately, patient is not able to afford Januvia, presumably due to a high deductible as it was affordable at the end of 2024. Currently taking metformin 500 mg daily with breakfast and 1,000 mg nightly with dinner. Continue current regimen. I do not believe she will benefit from other cost efficient options like glimepiride, glipizide, or pioglitazone d/t increased risk of hypoglycemia and gi s/e. Referral given for MNT. Pt reports some symptoms of gastroparesis therefore, avoiding GLP-1 at this time- I also expect that would be cost prohibitive. Encouraged to be mindful of diet. Repeat A1C in 3 months. Should A1C increase, will increase metformin to 1,000 mg twice daily with meals. F/U in 6 months.    Lab Results   Component Value Date    HGBA1C 6.6 (H) 05/20/2025       Orders:    Ambulatory Referral to Diabetic Education; Future    metFORMIN (GLUCOPHAGE) 1000 MG tablet; Take 1/2 tablet (500 mg) daily with breakfast and 1 tablet (1000 mg) with dinner.    Hemoglobin A1C; Future    Basic metabolic panel; Future    Hemoglobin A1C; Future

## 2025-05-29 NOTE — ASSESSMENT & PLAN NOTE
Thyroid function is stable. Continue levothyroxine 175 mcg Monday-Saturday. Take 1.5 tablets on Sunday. Check TFT in 3 months with A1C. F/U in 6 months.   Orders:    TSH, 3rd generation; Future    T4, free; Future    levothyroxine 175 mcg tablet; take 1 tablet by mouth daily and ON SUNDAYS 1 1/2 TABLETS. TOTAL OF 7.5 TABLETS WEEKLY

## 2025-05-29 NOTE — ASSESSMENT & PLAN NOTE
Continue 40 mg of atorvastatin nightly.   Lab Results   Component Value Date    HGBA1C 6.6 (H) 05/20/2025

## 2025-06-03 ENCOUNTER — HOSPITAL ENCOUNTER (OUTPATIENT)
Dept: ULTRASOUND IMAGING | Facility: HOSPITAL | Age: 65
Discharge: HOME | End: 2025-06-03
Attending: PHYSICIAN ASSISTANT
Payer: COMMERCIAL

## 2025-06-03 ENCOUNTER — APPOINTMENT (OUTPATIENT)
Dept: RADIOLOGY | Facility: HOSPITAL | Age: 65
End: 2025-06-03
Payer: COMMERCIAL

## 2025-06-03 DIAGNOSIS — R49.9 CHANGE IN VOICE: ICD-10-CM

## 2025-06-03 PROCEDURE — 76536 US EXAM OF HEAD AND NECK: CPT

## 2025-06-10 ENCOUNTER — ANNUAL EXAM (OUTPATIENT)
Age: 65
End: 2025-06-10
Payer: COMMERCIAL

## 2025-06-10 VITALS — DIASTOLIC BLOOD PRESSURE: 68 MMHG | WEIGHT: 290.8 LBS | SYSTOLIC BLOOD PRESSURE: 114 MMHG | BODY MASS INDEX: 46.94 KG/M2

## 2025-06-10 DIAGNOSIS — Z01.419 ENCOUNTER FOR ANNUAL ROUTINE GYNECOLOGICAL EXAMINATION: Primary | ICD-10-CM

## 2025-06-10 DIAGNOSIS — Z11.51 SCREENING FOR HUMAN PAPILLOMAVIRUS (HPV): ICD-10-CM

## 2025-06-10 DIAGNOSIS — Z12.31 ENCOUNTER FOR SCREENING MAMMOGRAM FOR MALIGNANT NEOPLASM OF BREAST: ICD-10-CM

## 2025-06-10 DIAGNOSIS — Z12.4 SCREENING FOR CERVICAL CANCER: ICD-10-CM

## 2025-06-10 PROCEDURE — 99396 PREV VISIT EST AGE 40-64: CPT | Performed by: OBSTETRICS & GYNECOLOGY

## 2025-06-10 PROCEDURE — G0476 HPV COMBO ASSAY CA SCREEN: HCPCS | Performed by: OBSTETRICS & GYNECOLOGY

## 2025-06-10 PROCEDURE — G0145 SCR C/V CYTO,THINLAYER,RESCR: HCPCS | Performed by: OBSTETRICS & GYNECOLOGY

## 2025-06-10 NOTE — PROGRESS NOTES
Annual Wellness Visit  Name: Tong Bolaños      : 1960      MRN: [id number]  Encounter Provider: Ava Peng MD  Encounter Date: 6/10/2025   Encounter department: [facility] [place]    64 y.o.  yo presents today for her annual exam.:  Assessment & Plan  Encounter for annual routine gynecological examination         Screening for cervical cancer    Orders:    Liquid-based pap, screening    Screening for human papillomavirus (HPV)    Orders:    Liquid-based pap, screening    Encounter for screening mammogram for malignant neoplasm of breast    Orders:    Mammo screening bilateral w 3d and cad; Future             History of Present Illness     Tong Bolaños is a 64 y.o. female who presents for annual well woman exam.    GYN:  Denies vaginal discharge, labial changes   No VB  Patient is not sexually active      OB:   female      :  Denies dysuria, urinary frequency or urgency.  No hematuria, flank pain, incontinence.  No constipation, diarrhea    Breast:  Denies breast mass, skin changes  No breast discharge.  Patient does not have a family history of breast, endometrial, or ovarian ca in a first degree relative    General:  Diet: Reviewed dietary calcium recommendations  Exercise: Reviewed recommendation of 150 minutes of moderate intensity exercise per week  ETOH use: no  Tobacco use: no  Recreational drug use: no    Screening:  Last Pap: 21 NILM/HPV neg  Last Mammo: 3/19/25  BI-RADS, 2 Benign  Colon: 22 (5 yr recall)  STD screening: declined.      Review of Systems as per HPI       Objective   /68   Wt 132 kg (290 lb 12.8 oz)   LMP 1999 (Exact Date)   BMI 46.94 kg/m²      Physical Exam  Constitutional:       Appearance: Normal appearance.   Genitourinary:      Vulva and urethral meatus normal.      No vaginal erythema, tenderness, bleeding or ulceration.        Right Adnexa: not tender, not full and no mass present.     Left Adnexa: not  tender, not full and no mass present.     No cervical discharge, friability or lesion.      Uterus is not enlarged, fixed or tender.      No uterine mass detected.     Bladder is not tender.    Breasts:     Breasts are soft.     Right: Normal. No inverted nipple, mass, nipple discharge, skin change or tenderness.      Left: Normal. No inverted nipple, mass, nipple discharge, skin change or tenderness.   HENT:      Head: Normocephalic.     Cardiovascular:      Rate and Rhythm: Normal rate and regular rhythm.   Pulmonary:      Effort: Pulmonary effort is normal.   Abdominal:      General: There is no distension.      Palpations: Abdomen is soft.      Tenderness: There is no abdominal tenderness.     Musculoskeletal:         General: No swelling.   Lymphadenopathy:      Upper Body:      Right upper body: No axillary adenopathy.      Left upper body: No axillary adenopathy.     Neurological:      General: No focal deficit present.      Mental Status: She is alert and oriented to person, place, and time.     Skin:     General: Skin is warm and dry.     Psychiatric:         Mood and Affect: Mood normal.         Behavior: Behavior normal.   Vitals reviewed.

## 2025-06-11 ENCOUNTER — RESULTS FOLLOW-UP (OUTPATIENT)
Dept: HEMATOLOGY ONCOLOGY | Facility: CLINIC | Age: 65
End: 2025-06-11

## 2025-06-16 LAB
LAB AP GYN PRIMARY INTERPRETATION: NORMAL
Lab: NORMAL
PATH INTERP SPEC-IMP: NORMAL

## 2025-06-17 ENCOUNTER — RESULTS FOLLOW-UP (OUTPATIENT)
Age: 65
End: 2025-06-17

## 2025-06-22 LAB — COLOGUARD RESULT REPORTABLE: NEGATIVE

## 2025-06-23 ENCOUNTER — RESULTS FOLLOW-UP (OUTPATIENT)
Dept: FAMILY MEDICINE CLINIC | Facility: CLINIC | Age: 65
End: 2025-06-23

## 2025-06-26 ENCOUNTER — OFFICE VISIT (OUTPATIENT)
Dept: HEMATOLOGY ONCOLOGY | Facility: CLINIC | Age: 65
End: 2025-06-26
Payer: COMMERCIAL

## 2025-06-26 ENCOUNTER — TELEPHONE (OUTPATIENT)
Dept: HEMATOLOGY ONCOLOGY | Facility: CLINIC | Age: 65
End: 2025-06-26

## 2025-06-26 VITALS
DIASTOLIC BLOOD PRESSURE: 76 MMHG | BODY MASS INDEX: 46.77 KG/M2 | HEART RATE: 79 BPM | WEIGHT: 291 LBS | TEMPERATURE: 97.9 F | HEIGHT: 66 IN | OXYGEN SATURATION: 98 % | RESPIRATION RATE: 18 BRPM | SYSTOLIC BLOOD PRESSURE: 132 MMHG

## 2025-06-26 DIAGNOSIS — R76.8 POSITIVE ANA (ANTINUCLEAR ANTIBODY): ICD-10-CM

## 2025-06-26 DIAGNOSIS — D64.9 NORMOCYTIC ANEMIA: Primary | ICD-10-CM

## 2025-06-26 PROCEDURE — 99213 OFFICE O/P EST LOW 20 MIN: CPT | Performed by: PHYSICIAN ASSISTANT

## 2025-06-26 NOTE — TELEPHONE ENCOUNTER
Called patient back and made her aware we were able to retain her labs that she had done at [facility] [[email or web address]], and did apologize in advance as we were unaware that she had labs done at [facility] [[email or web address]] as they were not sent to the office. Labs scanned in under media. Patient to come in at 1pm.

## 2025-06-26 NOTE — PROGRESS NOTES
Name: Tong Bolaños      : 1960      MRN: [id number]  Encounter Provider: Shira Ramirez PA-C  Encounter Date: 2025   Encounter department: [facility] [place]  :  Assessment & Plan  Normocytic anemia  RPI 1.8 inadequate marrow response.   Hemoglobin has been stable for many years and there is no development of cytopenias or constitutional symptoms making bone marrow failure syndrome, hematological malignancy etc. unlikely.  B12 has been corrected without improvement in anemia.  Overall findings are consistent with anemia of chronic disease. She has no renal disease. She was found to have positive PRINCE with titer of 1:640.  Patient does mention she was thought to have autoimmune disease in the past however has seen rheumatology.  She is up-to-date on mammogram and colonoscopy.     Suspect anemia of chronic disease, possible autoimmune d/o origin.   Rheum referral as below   We will trend state CBC every 6 months, stable consider follow-up as needed  We will request prior hematology records   Continue age related cancer screenings   Orders:    Ambulatory Referral to Rheumatology; Future    CBC and differential; Future    Positive PRINCE (antinuclear antibody)  Recommend rheumatology consult for further evaluation/management    Orders:    Ambulatory Referral to Rheumatology; Future    CBC and differential; Future    I have spent a total time of 21 minutes in caring for this patient on the day of the visit/encounter including Diagnostic results, Impressions, Documenting in the medical record, Reviewing/placing orders in the medical record (including tests, medications, and/or procedures), and Obtaining or reviewing history  .    Assessment & Plan        Return in about 6 months (around 2025) for Office Visit.    History of Present Illness   Chief Complaint   Patient presents with    Follow-up     History of Present Illness  Tong Bolaños is a 64-year-old  female with past medical history of CVA, CAD s/p PCI, hypertension, obesity, HLD, anemia, type 2 diabetes, OA, colon polyps, Graves' disease s/p radioiodine therapy who has been referred by PCP for evaluation of fatigue.     Patient was recently evaluated by PCP 01/2025 for complaints of fatigue.  Complete blood count was performed which showed normocytic anemia with hemoglobin 11.2, MCV 89.  On chart review, she has had a chronic normocytic anemia with hemoglobin around 11 g/dL since at least 2021.  There are no labs prior to this available for review however patient reports she had anemia her whole life, including during childhood. She had additional workup for her anemia around 2023 which showed normal B12, folate, ferritin, thyroid level.  B12/iron panel was repeated 10/2024.  B12 level was decreased from around 700 to 237. States she was on B12 prior to this and had stopped. Iron panel remain normal. She is now back on B12.      She has been seen by hematology in [place] previously for low platelets. This was attributed to Graves disease. She radiation for this. She states she never required treatment such as steroids, IVIG. She has never been hospitalized for low PLT/anemia or required transfusions. She does not know name of hematology practice.     She is up-to-date on colonoscopy, last performed 06/2022.  History of colon polyps (tubular adenoma), family history of colon cancer. She has no known liver or kidney disease.      No alcohol, tobacco or drug use. 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      No personal history of cancer. Sister has cancer (unknown type, 50 at diagnosis), brother had colon cancer at 51yo, maternal grandfather prostate cancer, mother has cervical cancer, 3 maternal aunt had breast cancer. She believes she had genetic testing previously. She believes she tested positive      Granddaughter had anemia, no other history of anemia.     Labs   WBC 5.7k    Hgb 11.0, RBC 3.79   MCV 92  PLT 138k L  "  MCHC LOW   B12 899   Ferritin 47   SPEP: no monoclonal protein   LD normal   Retic 2.1%  CRP 4.8 normal   PRINCE positive, 1:640 titer   Nuclear, dense fine speckled     Pertinent Medical History   05/22/25: she had spontaneous bruise on left cheek a coupe weeks ago.     06/26/25: No new complaints.  Patient is feeling well.     Review of Systems   Musculoskeletal:  Positive for arthralgias.   All other systems reviewed and are negative.          Objective   /76 (BP Location: Right arm, Patient Position: Sitting, Cuff Size: Large)   Pulse 79   Temp 97.9 °F (36.6 °C) (Temporal)   Resp 18   Ht 5' 6\" (1.676 m)   Wt 132 kg (291 lb)   LMP 09/17/1999 (Exact Date)   SpO2 98%   BMI 46.97 kg/m²     Physical Exam  Vitals (ambulates with cane) reviewed.   HENT:      Head: Normocephalic.   Pulmonary:      Effort: Pulmonary effort is normal.     Musculoskeletal:      Cervical back: Neck supple.     Skin:     Findings: No rash.     Neurological:      Mental Status: She is alert.       Physical Exam      Results    Labs: I have reviewed the following labs:  Results for orders placed or performed in visit on 06/10/25   HPV High Risk    Specimen: Thin-Prep Vial   Result Value Ref Range    HPV Other HR Negative Negative    HPV16 Negative Negative    HPV18 Negative Negative   Liquid-based pap, screening   Result Value Ref Range    Case Report       Gynecologic Cytology Report                       Case: [id number]                                  Authorizing Provider:  Ava Peng MD  Collected:           06/10/2025 1159              Ordering Location:     [facility] [place] Received:            06/10/2025 1159                                     View                                                                         First Screen:          Etta Charles                                                                Specimen:    LIQUID-BASED PAP, SCREENING, Cervix, Endocervical                       "                    Primary Interpretation Negative for intraepithelial lesion or malignancy     Interpretation       Fungal organisms morphologically consistent with Candida spp    Specimen Adequacy       Satisfactory for evaluation. Absence of endocervical/transformation zone component.    Additional Information       [employer] FDA approved ,  and ThinPrep Imaging Duo System are utilized with strict adherence to the 's instruction manual to prepare gynecologic and non-gynecologic cytology specimens for the production of ThinPrep slides as well as for gynecologic ThinPrep imaging. These processes have been validated by our laboratory and/or by the .  The Pap test is not a diagnostic procedure and should not be used as the sole means to detect cervical cancer. It is only a screening procedure to aid in the detection of cervical cancer and its precursors. Both false-negative and false-positive results have been experienced. Your patient's test result should be interpreted in this context together with the history and clinical findings.      Gross Description       A. 20 ml , colorless, cloudy received in a ThinPrep vial.

## 2025-06-26 NOTE — TELEPHONE ENCOUNTER
Patient called back, stating on 6/16 she had her bloodwork done at [facility]. She can also not do the appointment given, as she needs a Tuesday or Thursday only

## 2025-06-26 NOTE — TELEPHONE ENCOUNTER
Called and left voicemail for patient advising her appointment for today with Shira has been cancelled and rescheduled to Friday 7/11 at 1pm. Patient did not complete labs, needs to be completed prior to appointment. Also patient was scheduled too soon to see Shira, was to return in 6 weeks not 4 weeks. Provided hopeline phone number in voicemail.

## 2025-07-24 ENCOUNTER — CONSULT (OUTPATIENT)
Age: 65
End: 2025-07-24
Payer: COMMERCIAL

## 2025-07-24 VITALS
TEMPERATURE: 94.1 F | BODY MASS INDEX: 46.51 KG/M2 | HEIGHT: 66 IN | HEART RATE: 64 BPM | DIASTOLIC BLOOD PRESSURE: 74 MMHG | WEIGHT: 289.4 LBS | SYSTOLIC BLOOD PRESSURE: 128 MMHG | OXYGEN SATURATION: 99 %

## 2025-07-24 DIAGNOSIS — R76.8 POSITIVE ANA (ANTINUCLEAR ANTIBODY): ICD-10-CM

## 2025-07-24 DIAGNOSIS — D64.9 NORMOCYTIC ANEMIA: ICD-10-CM

## 2025-07-24 PROCEDURE — 99244 OFF/OP CNSLTJ NEW/EST MOD 40: CPT | Performed by: STUDENT IN AN ORGANIZED HEALTH CARE EDUCATION/TRAINING PROGRAM

## 2025-07-24 NOTE — PROGRESS NOTES
"Name: Tong Bolaños      : 1960      MRN: [id number]  Encounter Provider: Ava Velazquez MD  Encounter Date: 2025   Encounter department: [facility] [address]  :  Tong Bolaños is a 64 y.o. female with pmh of hypothyroidism (s/p radiation), T2 DM, HTN, CAD, s/p LAD placement who presents was referred due to abnormal blood work. Pt was referred for positive PRINCE with titer of 1:640.       Assessment & Plan  Positive PRINCE (antinuclear antibody)    Orders:    Ambulatory Referral to Rheumatology    Normocytic anemia    Orders:    Ambulatory Referral to Rheumatology        History of Present Illness   Tong Bolaños is a 64 y.o. female with pmh of hypothyroidism (s/p radiation), T2 DM, HTN, CAD, s/p LAD placement who presents was referred due to abnormal blood work. Pt was referred for positive PRINCE with titer of 1:640.     Pt states that she is feeling tired and has a lot of inflammation in her knees especially after working in the house.    Knees, 2nd R fingers, wrists. Am stiffness for less than 10 mins. She reports that she feels some \"water in her knees\" and that her feet are swelling at the end of the day. She claims that with physical activity/walking her legs are better. She admits that pcp gave her PCP gave her diuretics but it worsened her pain.       Pt is using cane for walking for 2 years, she had a fall and she can't extend her knee fully since. She states its comes and goes for about 7 years after the radiation for had thyroid disease.     Pt is not taking diclofenac as she was told that its might affect her kidney.    Pt denies rashes. She claims that Intermittently she has dryness in her mouth and eyes. Denies inflamed red eye.    She is waking in the middle of the night with Numbness/tingling in her hands.    Pt is taking iron, vit b, b12, vit D, mag, potassium.    No alcohol, jdrugs or smoking.    Denies:  Fever  Rash  Oral/nasal/genital ulcers  Muscle " weakness  Inflammatory eye symptoms  Dactylitis  Dysphagia/odynophagia  CP  NOLASCO  SOB at rest  Pleurisy  Stomach pain  Constipation/bloating  Hematochezia  Gross hematuria  Raynaud's  Joint issues other than noted above    History obtained from: patient    Review of Systems   Constitutional:  Negative for chills and fever.   HENT:  Negative for ear pain and sore throat.    Eyes:  Negative for pain and visual disturbance.   Respiratory:  Negative for cough and shortness of breath.    Cardiovascular:  Negative for chest pain and palpitations.   Gastrointestinal:  Negative for abdominal pain and vomiting.   Genitourinary:  Negative for dysuria and hematuria.   Musculoskeletal:  Positive for arthralgias. Negative for back pain.   Skin:  Negative for color change and rash.   Neurological:  Negative for seizures and syncope.   All other systems reviewed and are negative.          Medical History Reviewed by provider this encounter:     .  Past Medical History   Past Medical History[1]  Past Surgical History[2]  Family History[3]   reports that she has never smoked. She has never been exposed to tobacco smoke. She has never used smokeless tobacco. She reports that she does not drink alcohol and does not use drugs.  Current Outpatient Medications   Medication Instructions    Aspirin Low Dose 81 MG EC tablet TAKE 1 TABLET BY MOUTH EVERY DAY    benzonatate (TESSALON) 200 mg, Oral, 3 times daily PRN    Blood Glucose Monitoring Suppl (OneTouch Verio Reflect) w/Device KIT Check blood sugars once daily. Please substitute with appropriate alternative as covered by patient's insurance. Dx: E11.65    carvedilol (COREG) 12.5 mg, Oral, 2 times daily    cyanocobalamin (VITAMIN B-12) 5,000 mcg, Oral, Daily    diclofenac sodium (VOLTAREN) 50 mg, Oral, 2 times daily PRN    glucose blood (OneTouch Verio) test strip Check blood sugars once daily. Please substitute with appropriate alternative as covered by patient's insurance. Dx: E11.65     "hydroCHLOROthiazide 12.5 mg, Oral, Daily    levothyroxine 175 mcg tablet take 1 tablet by mouth daily and ON SUNDAYS 1 1/2 TABLETS. TOTAL OF 7.5 TABLETS WEEKLY    lisinopril (ZESTRIL) 20 mg, Oral, Daily    metFORMIN (GLUCOPHAGE) 1000 MG tablet Take 1/2 tablet (500 mg) daily with breakfast and 1 tablet (1000 mg) with dinner.    OneTouch Delica Lancets 33G MISC Check blood sugars once daily. Please substitute with appropriate alternative as covered by patient's insurance. Dx: E11.65    simvastatin (ZOCOR) 40 mg, Oral, Daily at bedtime   Allergies[4]   Medications Ordered Prior to Encounter[5]   Social History[6]     Objective   /74   Pulse 64   Temp (!) 94.1 °F (34.5 °C) (Tympanic)   Ht 5' 6\" (1.676 m)   Wt 131 kg (289 lb 6.4 oz)   LMP 09/17/1999 (Exact Date)   SpO2 99%   BMI 46.71 kg/m²      Physical Exam  Vitals and nursing note reviewed.   Constitutional:       General: She is not in acute distress.     Appearance: She is well-developed. She is obese.   HENT:      Head: Normocephalic and atraumatic.     Eyes:      Conjunctiva/sclera: Conjunctivae normal.       Cardiovascular:      Rate and Rhythm: Normal rate and regular rhythm.      Heart sounds: No murmur heard.  Pulmonary:      Effort: Pulmonary effort is normal. No respiratory distress.      Breath sounds: Normal breath sounds.   Abdominal:      Palpations: Abdomen is soft.      Tenderness: There is no abdominal tenderness.     Musculoskeletal:         General: No swelling.      Cervical back: Neck supple.      Right lower leg: Edema present.      Left lower leg: Edema present.      Comments: Right wrist swelling  b/l ankle swelling     Skin:     General: Skin is warm and dry.      Capillary Refill: Capillary refill takes less than 2 seconds.     Neurological:      Mental Status: She is alert.     Psychiatric:         Mood and Affect: Mood normal.                  [1]   Past Medical History:  Diagnosis Date    Anginal equivalent (HCC) 08/07/2023    " COVID-19 09/06/2023    Diabetes mellitus (HCC)     Disease of thyroid gland     Hypertension    [2]   Past Surgical History:  Procedure Laterality Date    CORONARY ANGIOPLASTY WITH STENT PLACEMENT      LAD   [3]   Family History  Problem Relation Name Age of Onset    Cervical cancer Mother          hysterectomy    Cancer Sister      Diabetes Daughter      Diabetes Maternal Grandmother      Prostate cancer Maternal Grandfather      Stomach cancer Paternal Grandmother      Asthma Paternal Grandfather      Breast cancer Maternal Aunt          masectomy    Breast cancer Maternal Aunt          found lump in nipple    Heart disease Paternal Aunt      Heart disease Paternal Aunt      Colon cancer Brother     [4]   Allergies  Allergen Reactions    Kiwi Extract - Food Allergy Anaphylaxis    Shrimp (Diagnostic) - Food Allergy Anaphylaxis    Shellfish-Derived Products - Food Allergy Rash     All Seafood     Latex Hives    Nuts - Food Allergy Swelling     walnuts   [5]   Current Outpatient Medications on File Prior to Visit   Medication Sig Dispense Refill    Aspirin Low Dose 81 MG EC tablet TAKE 1 TABLET BY MOUTH EVERY DAY 90 tablet 1    Blood Glucose Monitoring Suppl (OneTouch Verio Reflect) w/Device KIT Check blood sugars once daily. Please substitute with appropriate alternative as covered by patient's insurance. Dx: E11.65 1 kit 0    carvedilol (COREG) 12.5 mg tablet take 1 tablet by mouth twice a day 180 tablet 1    cyanocobalamin (VITAMIN B-12) 2500 MCG TABS Take 2 tablets (5,000 mcg total) by mouth daily      diclofenac sodium (VOLTAREN) 50 mg EC tablet Take 1 tablet (50 mg total) by mouth 2 (two) times a day as needed (knee pain) 60 tablet 1    glucose blood (OneTouch Verio) test strip Check blood sugars once daily. Please substitute with appropriate alternative as covered by patient's insurance. Dx: E11.65 100 each 3    hydroCHLOROthiazide 12.5 mg tablet Take 1 tablet (12.5 mg total) by mouth daily 90 tablet 3     levothyroxine 175 mcg tablet take 1 tablet by mouth daily and ON SUNDAYS 1 1/2 TABLETS. TOTAL OF 7.5 TABLETS WEEKLY 90 tablet 1    lisinopril (ZESTRIL) 20 mg tablet take 1 tablet by mouth once daily 90 tablet 1    metFORMIN (GLUCOPHAGE) 1000 MG tablet Take 1/2 tablet (500 mg) daily with breakfast and 1 tablet (1000 mg) with dinner. 180 tablet 1    OneTouch Delica Lancets 33G MISC Check blood sugars once daily. Please substitute with appropriate alternative as covered by patient's insurance. Dx: E11.65 100 each 3    simvastatin (ZOCOR) 40 mg tablet Take 1 tablet (40 mg total) by mouth daily at bedtime 100 tablet 1    benzonatate (TESSALON) 200 MG capsule Take 1 capsule (200 mg total) by mouth 3 (three) times a day as needed for cough (Patient not taking: Reported on 7/24/2025) 20 capsule 0     No current facility-administered medications on file prior to visit.   [6]   Social History  Tobacco Use    Smoking status: Never     Passive exposure: Never    Smokeless tobacco: Never   Vaping Use    Vaping status: Never Used   Substance and Sexual Activity    Alcohol use: Never    Drug use: Never    Sexual activity: Not Currently     Partners: Male     Birth control/protection: Post-menopausal

## 2025-08-04 ENCOUNTER — OFFICE VISIT (OUTPATIENT)
Dept: FAMILY MEDICINE CLINIC | Facility: CLINIC | Age: 65
End: 2025-08-04
Payer: COMMERCIAL

## 2025-08-04 VITALS
OXYGEN SATURATION: 98 % | SYSTOLIC BLOOD PRESSURE: 124 MMHG | TEMPERATURE: 96.3 F | DIASTOLIC BLOOD PRESSURE: 72 MMHG | HEIGHT: 66 IN | HEART RATE: 74 BPM | WEIGHT: 292 LBS | BODY MASS INDEX: 46.93 KG/M2

## 2025-08-04 DIAGNOSIS — E89.0 HYPOTHYROIDISM FOLLOWING RADIOIODINE THERAPY: ICD-10-CM

## 2025-08-04 DIAGNOSIS — E66.01 TYPE 2 DIABETES MELLITUS WITH MORBID OBESITY (HCC): Primary | ICD-10-CM

## 2025-08-04 DIAGNOSIS — I25.10 CORONARY ARTERY DISEASE INVOLVING NATIVE CORONARY ARTERY OF NATIVE HEART WITHOUT ANGINA PECTORIS: ICD-10-CM

## 2025-08-04 DIAGNOSIS — E11.69 TYPE 2 DIABETES MELLITUS WITH MORBID OBESITY (HCC): Primary | ICD-10-CM

## 2025-08-04 DIAGNOSIS — I10 HYPERTENSION COMPLICATING DIABETES (HCC): ICD-10-CM

## 2025-08-04 DIAGNOSIS — E11.69 HYPERLIPIDEMIA ASSOCIATED WITH TYPE 2 DIABETES MELLITUS  (HCC): ICD-10-CM

## 2025-08-04 DIAGNOSIS — E78.5 HYPERLIPIDEMIA ASSOCIATED WITH TYPE 2 DIABETES MELLITUS  (HCC): ICD-10-CM

## 2025-08-04 DIAGNOSIS — E11.9 HYPERTENSION COMPLICATING DIABETES (HCC): ICD-10-CM

## 2025-08-04 PROCEDURE — 99214 OFFICE O/P EST MOD 30 MIN: CPT | Performed by: FAMILY MEDICINE

## 2025-08-06 ENCOUNTER — TELEPHONE (OUTPATIENT)
Dept: ADMINISTRATIVE | Facility: OTHER | Age: 65
End: 2025-08-06